# Patient Record
Sex: FEMALE | Race: WHITE | NOT HISPANIC OR LATINO | Employment: FULL TIME | URBAN - NONMETROPOLITAN AREA
[De-identification: names, ages, dates, MRNs, and addresses within clinical notes are randomized per-mention and may not be internally consistent; named-entity substitution may affect disease eponyms.]

---

## 2018-03-26 ENCOUNTER — APPOINTMENT (OUTPATIENT)
Dept: GENERAL RADIOLOGY | Facility: HOSPITAL | Age: 28
End: 2018-03-26

## 2018-03-26 ENCOUNTER — HOSPITAL ENCOUNTER (OUTPATIENT)
Facility: HOSPITAL | Age: 28
Discharge: HOME | End: 2018-03-27
Attending: EMERGENCY MEDICINE | Admitting: ORTHOPAEDIC SURGERY

## 2018-03-26 DIAGNOSIS — S06.0X1A CONCUSSION WITH LOSS OF CONSCIOUSNESS OF 30 MINUTES OR LESS, INITIAL ENCOUNTER: ICD-10-CM

## 2018-03-26 DIAGNOSIS — S52.201A CLOSED FRACTURE OF RIGHT RADIUS AND ULNA, INITIAL ENCOUNTER: Primary | ICD-10-CM

## 2018-03-26 DIAGNOSIS — S52.91XA CLOSED FRACTURE OF RIGHT RADIUS AND ULNA, INITIAL ENCOUNTER: Primary | ICD-10-CM

## 2018-03-26 LAB
BASOPHILS # BLD AUTO: 0.04 10*3/MM3 (ref 0–0.3)
BASOPHILS NFR BLD AUTO: 0.2 % (ref 0–2)
DEPRECATED RDW RBC AUTO: 41.5 FL (ref 37–54)
EOSINOPHIL # BLD AUTO: 0.18 10*3/MM3 (ref 0–0.7)
EOSINOPHIL NFR BLD AUTO: 1.1 % (ref 0–5)
ERYTHROCYTE [DISTWIDTH] IN BLOOD BY AUTOMATED COUNT: 13.4 % (ref 11.5–14.5)
HCG SERPL QL: NEGATIVE
HCT VFR BLD AUTO: 39.4 % (ref 37–47)
HGB BLD-MCNC: 12.9 G/DL (ref 12–16)
IMM GRANULOCYTES # BLD: 0.08 10*3/MM3 (ref 0–0.03)
IMM GRANULOCYTES NFR BLD: 0.5 % (ref 0–0.5)
INR PPP: 1.01 (ref 0.9–1.1)
LYMPHOCYTES # BLD AUTO: 4.76 10*3/MM3 (ref 1–3)
LYMPHOCYTES NFR BLD AUTO: 29 % (ref 21–51)
MCH RBC QN AUTO: 28 PG (ref 27–33)
MCHC RBC AUTO-ENTMCNC: 32.7 G/DL (ref 33–37)
MCV RBC AUTO: 85.5 FL (ref 80–94)
MONOCYTES # BLD AUTO: 0.8 10*3/MM3 (ref 0.1–0.9)
MONOCYTES NFR BLD AUTO: 4.9 % (ref 0–10)
NEUTROPHILS # BLD AUTO: 10.56 10*3/MM3 (ref 1.4–6.5)
NEUTROPHILS NFR BLD AUTO: 64.3 % (ref 30–70)
PLATELET # BLD AUTO: 244 10*3/MM3 (ref 130–400)
PMV BLD AUTO: 11.5 FL (ref 6–10)
PROTHROMBIN TIME: 13.4 SECONDS (ref 11–15.4)
RBC # BLD AUTO: 4.61 10*6/MM3 (ref 4.2–5.4)
WBC NRBC COR # BLD: 16.42 10*3/MM3 (ref 4.5–12.5)

## 2018-03-26 PROCEDURE — 85025 COMPLETE CBC W/AUTO DIFF WBC: CPT | Performed by: EMERGENCY MEDICINE

## 2018-03-26 PROCEDURE — 73090 X-RAY EXAM OF FOREARM: CPT | Performed by: RADIOLOGY

## 2018-03-26 PROCEDURE — 73070 X-RAY EXAM OF ELBOW: CPT

## 2018-03-26 PROCEDURE — 71045 X-RAY EXAM CHEST 1 VIEW: CPT | Performed by: RADIOLOGY

## 2018-03-26 PROCEDURE — 85610 PROTHROMBIN TIME: CPT | Performed by: EMERGENCY MEDICINE

## 2018-03-26 PROCEDURE — 25010000002 FENTANYL CITRATE (PF) 100 MCG/2ML SOLUTION

## 2018-03-26 PROCEDURE — 25010000002 ONDANSETRON PER 1 MG

## 2018-03-26 PROCEDURE — 73070 X-RAY EXAM OF ELBOW: CPT | Performed by: RADIOLOGY

## 2018-03-26 PROCEDURE — 36415 COLL VENOUS BLD VENIPUNCTURE: CPT

## 2018-03-26 PROCEDURE — 93005 ELECTROCARDIOGRAM TRACING: CPT | Performed by: EMERGENCY MEDICINE

## 2018-03-26 PROCEDURE — 73110 X-RAY EXAM OF WRIST: CPT | Performed by: RADIOLOGY

## 2018-03-26 PROCEDURE — 71045 X-RAY EXAM CHEST 1 VIEW: CPT

## 2018-03-26 PROCEDURE — 73090 X-RAY EXAM OF FOREARM: CPT

## 2018-03-26 PROCEDURE — 84703 CHORIONIC GONADOTROPIN ASSAY: CPT | Performed by: EMERGENCY MEDICINE

## 2018-03-26 PROCEDURE — 80053 COMPREHEN METABOLIC PANEL: CPT | Performed by: EMERGENCY MEDICINE

## 2018-03-26 PROCEDURE — 96374 THER/PROPH/DIAG INJ IV PUSH: CPT

## 2018-03-26 PROCEDURE — 73110 X-RAY EXAM OF WRIST: CPT

## 2018-03-26 PROCEDURE — 84484 ASSAY OF TROPONIN QUANT: CPT | Performed by: EMERGENCY MEDICINE

## 2018-03-26 PROCEDURE — 96375 TX/PRO/DX INJ NEW DRUG ADDON: CPT

## 2018-03-26 PROCEDURE — 99285 EMERGENCY DEPT VISIT HI MDM: CPT

## 2018-03-26 RX ORDER — FENTANYL CITRATE 50 UG/ML
INJECTION, SOLUTION INTRAMUSCULAR; INTRAVENOUS
Status: COMPLETED
Start: 2018-03-26 | End: 2018-03-26

## 2018-03-26 RX ORDER — ONDANSETRON 2 MG/ML
4 INJECTION INTRAMUSCULAR; INTRAVENOUS ONCE
Status: COMPLETED | OUTPATIENT
Start: 2018-03-26 | End: 2018-03-26

## 2018-03-26 RX ORDER — ONDANSETRON 2 MG/ML
INJECTION INTRAMUSCULAR; INTRAVENOUS
Status: COMPLETED
Start: 2018-03-26 | End: 2018-03-26

## 2018-03-26 RX ORDER — FENTANYL CITRATE 50 UG/ML
100 INJECTION, SOLUTION INTRAMUSCULAR; INTRAVENOUS ONCE
Status: COMPLETED | OUTPATIENT
Start: 2018-03-26 | End: 2018-03-26

## 2018-03-26 RX ORDER — FENTANYL CITRATE 50 UG/ML
100 INJECTION, SOLUTION INTRAMUSCULAR; INTRAVENOUS ONCE
Status: COMPLETED | OUTPATIENT
Start: 2018-03-26 | End: 2018-03-27

## 2018-03-26 RX ORDER — SODIUM CHLORIDE 0.9 % (FLUSH) 0.9 %
10 SYRINGE (ML) INJECTION AS NEEDED
Status: DISCONTINUED | OUTPATIENT
Start: 2018-03-26 | End: 2018-03-27

## 2018-03-26 RX ADMIN — ONDANSETRON 4 MG: 2 INJECTION INTRAMUSCULAR; INTRAVENOUS at 23:07

## 2018-03-26 RX ADMIN — FENTANYL CITRATE 100 MCG: 50 INJECTION, SOLUTION INTRAMUSCULAR; INTRAVENOUS at 23:07

## 2018-03-26 RX ADMIN — ONDANSETRON 4 MG: 2 INJECTION, SOLUTION INTRAMUSCULAR; INTRAVENOUS at 23:07

## 2018-03-26 RX ADMIN — FENTANYL CITRATE 100 MCG: 50 INJECTION INTRAMUSCULAR; INTRAVENOUS at 23:07

## 2018-03-27 ENCOUNTER — APPOINTMENT (OUTPATIENT)
Dept: GENERAL RADIOLOGY | Facility: HOSPITAL | Age: 28
End: 2018-03-27

## 2018-03-27 ENCOUNTER — ANESTHESIA EVENT (OUTPATIENT)
Dept: PERIOP | Facility: HOSPITAL | Age: 28
End: 2018-03-27

## 2018-03-27 ENCOUNTER — APPOINTMENT (OUTPATIENT)
Dept: CT IMAGING | Facility: HOSPITAL | Age: 28
End: 2018-03-27

## 2018-03-27 ENCOUNTER — ANESTHESIA (OUTPATIENT)
Dept: PERIOP | Facility: HOSPITAL | Age: 28
End: 2018-03-27

## 2018-03-27 VITALS
BODY MASS INDEX: 25.34 KG/M2 | SYSTOLIC BLOOD PRESSURE: 109 MMHG | DIASTOLIC BLOOD PRESSURE: 51 MMHG | HEART RATE: 99 BPM | RESPIRATION RATE: 18 BRPM | OXYGEN SATURATION: 99 % | TEMPERATURE: 98.3 F | WEIGHT: 137.7 LBS | HEIGHT: 62 IN

## 2018-03-27 PROBLEM — S52.91XA CLOSED FRACTURE OF RIGHT RADIUS AND ULNA: Status: ACTIVE | Noted: 2018-03-27

## 2018-03-27 PROBLEM — S52.201A CLOSED FRACTURE OF RIGHT RADIUS AND ULNA: Status: ACTIVE | Noted: 2018-03-27

## 2018-03-27 LAB
ALBUMIN SERPL-MCNC: 4.2 G/DL (ref 3.5–5)
ALBUMIN/GLOB SERPL: 1.4 G/DL (ref 1.5–2.5)
ALP SERPL-CCNC: 68 U/L (ref 35–104)
ALT SERPL W P-5'-P-CCNC: 34 U/L (ref 10–36)
ANION GAP SERPL CALCULATED.3IONS-SCNC: 8.4 MMOL/L (ref 3.6–11.2)
AST SERPL-CCNC: 93 U/L (ref 10–30)
BACTERIA UR QL AUTO: ABNORMAL /HPF
BILIRUB SERPL-MCNC: 0.3 MG/DL (ref 0.2–1.8)
BILIRUB UR QL STRIP: NEGATIVE
BUN BLD-MCNC: 9 MG/DL (ref 7–21)
BUN/CREAT SERPL: 13.6 (ref 7–25)
CALCIUM SPEC-SCNC: 9.3 MG/DL (ref 7.7–10)
CHLORIDE SERPL-SCNC: 109 MMOL/L (ref 99–112)
CLARITY UR: CLEAR
CO2 SERPL-SCNC: 21.6 MMOL/L (ref 24.3–31.9)
COLOR UR: YELLOW
CREAT BLD-MCNC: 0.66 MG/DL (ref 0.43–1.29)
GFR SERPL CREATININE-BSD FRML MDRD: 106 ML/MIN/1.73
GLOBULIN UR ELPH-MCNC: 2.9 GM/DL
GLUCOSE BLD-MCNC: 92 MG/DL (ref 70–110)
GLUCOSE UR STRIP-MCNC: NEGATIVE MG/DL
HCG INTACT+B SERPL-ACNC: <2 MIU/ML (ref 0–5)
HGB UR QL STRIP.AUTO: ABNORMAL
HOLD SPECIMEN: NORMAL
HOLD SPECIMEN: NORMAL
HYALINE CASTS UR QL AUTO: ABNORMAL /LPF
KETONES UR QL STRIP: ABNORMAL
LEUKOCYTE ESTERASE UR QL STRIP.AUTO: NEGATIVE
NITRITE UR QL STRIP: POSITIVE
OSMOLALITY SERPL CALC.SUM OF ELEC: 275.9 MOSM/KG (ref 273–305)
PH UR STRIP.AUTO: 5.5 [PH] (ref 5–8)
POTASSIUM BLD-SCNC: 3.3 MMOL/L (ref 3.5–5.3)
PROT SERPL-MCNC: 7.1 G/DL (ref 6–8)
PROT UR QL STRIP: NEGATIVE
RBC # UR: ABNORMAL /HPF
REF LAB TEST METHOD: ABNORMAL
SODIUM BLD-SCNC: 139 MMOL/L (ref 135–153)
SP GR UR STRIP: >1.03 (ref 1–1.03)
SQUAMOUS #/AREA URNS HPF: ABNORMAL /HPF
TROPONIN I SERPL-MCNC: <0.006 NG/ML
UROBILINOGEN UR QL STRIP: ABNORMAL
WBC UR QL AUTO: ABNORMAL /HPF
WHOLE BLOOD HOLD SPECIMEN: NORMAL
WHOLE BLOOD HOLD SPECIMEN: NORMAL

## 2018-03-27 PROCEDURE — 87186 SC STD MICRODIL/AGAR DIL: CPT | Performed by: EMERGENCY MEDICINE

## 2018-03-27 PROCEDURE — 25010000002 MIDAZOLAM PER 1 MG: Performed by: NURSE ANESTHETIST, CERTIFIED REGISTERED

## 2018-03-27 PROCEDURE — 25575 OPTX RDL&ULN SHFT FX RDS&ULN: CPT | Performed by: ORTHOPAEDIC SURGERY

## 2018-03-27 PROCEDURE — G0378 HOSPITAL OBSERVATION PER HR: HCPCS

## 2018-03-27 PROCEDURE — C1713 ANCHOR/SCREW BN/BN,TIS/BN: HCPCS | Performed by: ORTHOPAEDIC SURGERY

## 2018-03-27 PROCEDURE — 96375 TX/PRO/DX INJ NEW DRUG ADDON: CPT

## 2018-03-27 PROCEDURE — 71260 CT THORAX DX C+: CPT

## 2018-03-27 PROCEDURE — 99219 PR INITIAL OBSERVATION CARE/DAY 50 MINUTES: CPT | Performed by: PHYSICIAN ASSISTANT

## 2018-03-27 PROCEDURE — 84702 CHORIONIC GONADOTROPIN TEST: CPT | Performed by: PHYSICIAN ASSISTANT

## 2018-03-27 PROCEDURE — 74177 CT ABD & PELVIS W/CONTRAST: CPT | Performed by: RADIOLOGY

## 2018-03-27 PROCEDURE — 81001 URINALYSIS AUTO W/SCOPE: CPT | Performed by: EMERGENCY MEDICINE

## 2018-03-27 PROCEDURE — 70450 CT HEAD/BRAIN W/O DYE: CPT

## 2018-03-27 PROCEDURE — 25010000002 DEXAMETHASONE PER 1 MG: Performed by: NURSE ANESTHETIST, CERTIFIED REGISTERED

## 2018-03-27 PROCEDURE — 25010000002 FENTANYL CITRATE (PF) 100 MCG/2ML SOLUTION: Performed by: NURSE ANESTHETIST, CERTIFIED REGISTERED

## 2018-03-27 PROCEDURE — 74177 CT ABD & PELVIS W/CONTRAST: CPT

## 2018-03-27 PROCEDURE — 96361 HYDRATE IV INFUSION ADD-ON: CPT

## 2018-03-27 PROCEDURE — 71260 CT THORAX DX C+: CPT | Performed by: RADIOLOGY

## 2018-03-27 PROCEDURE — 96376 TX/PRO/DX INJ SAME DRUG ADON: CPT

## 2018-03-27 PROCEDURE — 25010000002 IOPAMIDOL 61 % SOLUTION: Performed by: EMERGENCY MEDICINE

## 2018-03-27 PROCEDURE — 25010000002 PROPOFOL 10 MG/ML EMULSION: Performed by: NURSE ANESTHETIST, CERTIFIED REGISTERED

## 2018-03-27 PROCEDURE — 25010000002 HYDROMORPHONE PER 4 MG: Performed by: EMERGENCY MEDICINE

## 2018-03-27 PROCEDURE — 87086 URINE CULTURE/COLONY COUNT: CPT | Performed by: EMERGENCY MEDICINE

## 2018-03-27 PROCEDURE — 72125 CT NECK SPINE W/O DYE: CPT

## 2018-03-27 PROCEDURE — 25010000002 MORPHINE PER 10 MG: Performed by: EMERGENCY MEDICINE

## 2018-03-27 PROCEDURE — 25010000002 KETOROLAC TROMETHAMINE PER 15 MG: Performed by: ORTHOPAEDIC SURGERY

## 2018-03-27 PROCEDURE — 25010000002 ONDANSETRON PER 1 MG: Performed by: NURSE ANESTHETIST, CERTIFIED REGISTERED

## 2018-03-27 PROCEDURE — 87077 CULTURE AEROBIC IDENTIFY: CPT | Performed by: EMERGENCY MEDICINE

## 2018-03-27 PROCEDURE — 25010000002 FENTANYL CITRATE (PF) 100 MCG/2ML SOLUTION: Performed by: EMERGENCY MEDICINE

## 2018-03-27 PROCEDURE — 73090 X-RAY EXAM OF FOREARM: CPT | Performed by: RADIOLOGY

## 2018-03-27 PROCEDURE — 76000 FLUOROSCOPY <1 HR PHYS/QHP: CPT

## 2018-03-27 PROCEDURE — 70450 CT HEAD/BRAIN W/O DYE: CPT | Performed by: RADIOLOGY

## 2018-03-27 PROCEDURE — 72125 CT NECK SPINE W/O DYE: CPT | Performed by: RADIOLOGY

## 2018-03-27 DEVICE — SCRW CORT S/TAP 3.5X14MM: Type: IMPLANTABLE DEVICE | Status: FUNCTIONAL

## 2018-03-27 DEVICE — SCRW CORT S/TAP 3.5X16MM: Type: IMPLANTABLE DEVICE | Status: FUNCTIONAL

## 2018-03-27 DEVICE — PLT LCP 6HL 3.5X11MM: Type: IMPLANTABLE DEVICE | Status: FUNCTIONAL

## 2018-03-27 DEVICE — IMPLANTABLE DEVICE: Type: IMPLANTABLE DEVICE | Status: FUNCTIONAL

## 2018-03-27 RX ORDER — SODIUM CHLORIDE, SODIUM LACTATE, POTASSIUM CHLORIDE, CALCIUM CHLORIDE 600; 310; 30; 20 MG/100ML; MG/100ML; MG/100ML; MG/100ML
100 INJECTION, SOLUTION INTRAVENOUS CONTINUOUS
Status: DISCONTINUED | OUTPATIENT
Start: 2018-03-27 | End: 2018-03-27

## 2018-03-27 RX ORDER — OXYCODONE HYDROCHLORIDE AND ACETAMINOPHEN 5; 325 MG/1; MG/1
1 TABLET ORAL ONCE AS NEEDED
Status: DISCONTINUED | OUTPATIENT
Start: 2018-03-27 | End: 2018-03-27 | Stop reason: HOSPADM

## 2018-03-27 RX ORDER — PROPOFOL 10 MG/ML
VIAL (ML) INTRAVENOUS AS NEEDED
Status: DISCONTINUED | OUTPATIENT
Start: 2018-03-27 | End: 2018-03-27 | Stop reason: SURG

## 2018-03-27 RX ORDER — FENTANYL CITRATE 50 UG/ML
50 INJECTION, SOLUTION INTRAMUSCULAR; INTRAVENOUS
Status: DISCONTINUED | OUTPATIENT
Start: 2018-03-27 | End: 2018-03-27 | Stop reason: HOSPADM

## 2018-03-27 RX ORDER — SODIUM CHLORIDE 0.9 % (FLUSH) 0.9 %
1-10 SYRINGE (ML) INJECTION AS NEEDED
Status: DISCONTINUED | OUTPATIENT
Start: 2018-03-27 | End: 2018-03-27 | Stop reason: HOSPADM

## 2018-03-27 RX ORDER — ONDANSETRON 2 MG/ML
4 INJECTION INTRAMUSCULAR; INTRAVENOUS ONCE AS NEEDED
Status: DISCONTINUED | OUTPATIENT
Start: 2018-03-27 | End: 2018-03-27 | Stop reason: HOSPADM

## 2018-03-27 RX ORDER — LIDOCAINE HYDROCHLORIDE 20 MG/ML
INJECTION, SOLUTION INFILTRATION; PERINEURAL AS NEEDED
Status: DISCONTINUED | OUTPATIENT
Start: 2018-03-27 | End: 2018-03-27 | Stop reason: SURG

## 2018-03-27 RX ORDER — HYDROCODONE BITARTRATE AND ACETAMINOPHEN 7.5; 325 MG/1; MG/1
1 TABLET ORAL ONCE AS NEEDED
Status: DISCONTINUED | OUTPATIENT
Start: 2018-03-27 | End: 2018-03-27 | Stop reason: HOSPADM

## 2018-03-27 RX ORDER — SODIUM CHLORIDE, SODIUM LACTATE, POTASSIUM CHLORIDE, CALCIUM CHLORIDE 600; 310; 30; 20 MG/100ML; MG/100ML; MG/100ML; MG/100ML
125 INJECTION, SOLUTION INTRAVENOUS CONTINUOUS
Status: DISCONTINUED | OUTPATIENT
Start: 2018-03-27 | End: 2018-03-27

## 2018-03-27 RX ORDER — KETOROLAC TROMETHAMINE 30 MG/ML
30 INJECTION, SOLUTION INTRAMUSCULAR; INTRAVENOUS ONCE
Status: COMPLETED | OUTPATIENT
Start: 2018-03-27 | End: 2018-03-27

## 2018-03-27 RX ORDER — HYDROCODONE BITARTRATE AND ACETAMINOPHEN 7.5; 325 MG/1; MG/1
1-2 TABLET ORAL EVERY 6 HOURS PRN
Qty: 24 TABLET | Refills: 0 | Status: SHIPPED | OUTPATIENT
Start: 2018-03-27 | End: 2018-10-28

## 2018-03-27 RX ORDER — FAMOTIDINE 10 MG/ML
INJECTION, SOLUTION INTRAVENOUS AS NEEDED
Status: DISCONTINUED | OUTPATIENT
Start: 2018-03-27 | End: 2018-03-27 | Stop reason: SURG

## 2018-03-27 RX ORDER — IPRATROPIUM BROMIDE AND ALBUTEROL SULFATE 2.5; .5 MG/3ML; MG/3ML
3 SOLUTION RESPIRATORY (INHALATION) ONCE AS NEEDED
Status: DISCONTINUED | OUTPATIENT
Start: 2018-03-27 | End: 2018-03-27 | Stop reason: HOSPADM

## 2018-03-27 RX ORDER — FENTANYL CITRATE 50 UG/ML
INJECTION, SOLUTION INTRAMUSCULAR; INTRAVENOUS AS NEEDED
Status: DISCONTINUED | OUTPATIENT
Start: 2018-03-27 | End: 2018-03-27 | Stop reason: SURG

## 2018-03-27 RX ORDER — ONDANSETRON 4 MG/1
4 TABLET, FILM COATED ORAL EVERY 8 HOURS PRN
Qty: 24 TABLET | Refills: 0 | Status: SHIPPED | OUTPATIENT
Start: 2018-03-27 | End: 2018-10-28

## 2018-03-27 RX ORDER — MIDAZOLAM HYDROCHLORIDE 1 MG/ML
1 INJECTION INTRAMUSCULAR; INTRAVENOUS
Status: DISCONTINUED | OUTPATIENT
Start: 2018-03-27 | End: 2018-03-27 | Stop reason: HOSPADM

## 2018-03-27 RX ORDER — ONDANSETRON 2 MG/ML
4 INJECTION INTRAMUSCULAR; INTRAVENOUS EVERY 8 HOURS PRN
Status: DISCONTINUED | OUTPATIENT
Start: 2018-03-27 | End: 2018-03-27

## 2018-03-27 RX ORDER — HYDROMORPHONE HCL 110MG/55ML
1 PATIENT CONTROLLED ANALGESIA SYRINGE INTRAVENOUS ONCE
Status: COMPLETED | OUTPATIENT
Start: 2018-03-27 | End: 2018-03-27

## 2018-03-27 RX ORDER — BUPIVACAINE HYDROCHLORIDE 5 MG/ML
INJECTION, SOLUTION PERINEURAL AS NEEDED
Status: DISCONTINUED | OUTPATIENT
Start: 2018-03-27 | End: 2018-03-27 | Stop reason: HOSPADM

## 2018-03-27 RX ORDER — MIDAZOLAM HYDROCHLORIDE 1 MG/ML
2 INJECTION INTRAMUSCULAR; INTRAVENOUS
Status: DISCONTINUED | OUTPATIENT
Start: 2018-03-27 | End: 2018-03-27 | Stop reason: HOSPADM

## 2018-03-27 RX ORDER — IBUPROFEN 600 MG/1
600 TABLET ORAL EVERY 6 HOURS PRN
Qty: 32 TABLET | Refills: 2 | Status: SHIPPED | OUTPATIENT
Start: 2018-03-27 | End: 2018-10-28

## 2018-03-27 RX ORDER — MEPERIDINE HYDROCHLORIDE 50 MG/ML
12.5 INJECTION INTRAMUSCULAR; INTRAVENOUS; SUBCUTANEOUS
Status: DISCONTINUED | OUTPATIENT
Start: 2018-03-27 | End: 2018-03-27 | Stop reason: HOSPADM

## 2018-03-27 RX ORDER — ONDANSETRON 2 MG/ML
INJECTION INTRAMUSCULAR; INTRAVENOUS AS NEEDED
Status: DISCONTINUED | OUTPATIENT
Start: 2018-03-27 | End: 2018-03-27 | Stop reason: SURG

## 2018-03-27 RX ORDER — MIDAZOLAM HYDROCHLORIDE 1 MG/ML
INJECTION INTRAMUSCULAR; INTRAVENOUS AS NEEDED
Status: DISCONTINUED | OUTPATIENT
Start: 2018-03-27 | End: 2018-03-27 | Stop reason: SURG

## 2018-03-27 RX ORDER — DEXAMETHASONE SODIUM PHOSPHATE 10 MG/ML
INJECTION INTRAMUSCULAR; INTRAVENOUS AS NEEDED
Status: DISCONTINUED | OUTPATIENT
Start: 2018-03-27 | End: 2018-03-27 | Stop reason: SURG

## 2018-03-27 RX ADMIN — MORPHINE SULFATE 4 MG: 4 INJECTION INTRAVENOUS at 03:38

## 2018-03-27 RX ADMIN — KETOROLAC TROMETHAMINE 30 MG: 30 INJECTION, SOLUTION INTRAMUSCULAR at 10:21

## 2018-03-27 RX ADMIN — EPHEDRINE SULFATE 10 MG: 50 INJECTION INTRAMUSCULAR; INTRAVENOUS; SUBCUTANEOUS at 12:48

## 2018-03-27 RX ADMIN — FENTANYL CITRATE 100 MCG: 50 INJECTION INTRAMUSCULAR; INTRAVENOUS at 00:04

## 2018-03-27 RX ADMIN — FAMOTIDINE 20 MG: 10 INJECTION, SOLUTION INTRAVENOUS at 12:33

## 2018-03-27 RX ADMIN — SODIUM CHLORIDE 1000 ML: 9 INJECTION, SOLUTION INTRAVENOUS at 02:55

## 2018-03-27 RX ADMIN — ONDANSETRON 4 MG: 2 INJECTION, SOLUTION INTRAMUSCULAR; INTRAVENOUS at 12:33

## 2018-03-27 RX ADMIN — MORPHINE SULFATE 4 MG: 4 INJECTION INTRAVENOUS at 06:56

## 2018-03-27 RX ADMIN — IOPAMIDOL 95 ML: 612 INJECTION, SOLUTION INTRAVENOUS at 00:34

## 2018-03-27 RX ADMIN — MIDAZOLAM HYDROCHLORIDE 2 MG: 1 INJECTION, SOLUTION INTRAMUSCULAR; INTRAVENOUS at 12:19

## 2018-03-27 RX ADMIN — LIDOCAINE HYDROCHLORIDE 40 MG: 20 INJECTION, SOLUTION INFILTRATION; PERINEURAL at 12:19

## 2018-03-27 RX ADMIN — PROPOFOL 150 MG: 10 INJECTION, EMULSION INTRAVENOUS at 12:19

## 2018-03-27 RX ADMIN — SODIUM CHLORIDE 1000 ML: 9 INJECTION, SOLUTION INTRAVENOUS at 00:04

## 2018-03-27 RX ADMIN — EPHEDRINE SULFATE 10 MG: 50 INJECTION INTRAMUSCULAR; INTRAVENOUS; SUBCUTANEOUS at 12:36

## 2018-03-27 RX ADMIN — HYDROMORPHONE HYDROCHLORIDE 1 MG: 2 INJECTION INTRAMUSCULAR; INTRAVENOUS; SUBCUTANEOUS at 01:50

## 2018-03-27 RX ADMIN — DEXAMETHASONE SODIUM PHOSPHATE 8 MG: 10 INJECTION INTRAMUSCULAR; INTRAVENOUS at 12:33

## 2018-03-27 RX ADMIN — FENTANYL CITRATE 100 MCG: 50 INJECTION INTRAMUSCULAR; INTRAVENOUS at 12:19

## 2018-03-27 RX ADMIN — SODIUM CHLORIDE, POTASSIUM CHLORIDE, SODIUM LACTATE AND CALCIUM CHLORIDE: 600; 310; 30; 20 INJECTION, SOLUTION INTRAVENOUS at 12:13

## 2018-03-27 NOTE — ANESTHESIA PROCEDURE NOTES
Airway    General Information and Staff    CRNA: MAHAD ALVARES    Indications and Patient Condition    Preoxygenated: yes      Final Airway Details  Final airway type: supraglottic airway      Successful airway: classic  Size 3    Number of attempts at approach: 1

## 2018-03-27 NOTE — ANESTHESIA PREPROCEDURE EVALUATION
Anesthesia Evaluation     no history of anesthetic complications:  NPO Solid Status: > 8 hours  NPO Liquid Status: > 8 hours           Airway   Mallampati: II  TM distance: >3 FB  Neck ROM: full  No difficulty expected  Dental - normal exam     Pulmonary - normal exam   (+) a smoker Current Smoked day of surgery,   Cardiovascular - normal exam        Neuro/Psych  GI/Hepatic/Renal/Endo      Musculoskeletal     Abdominal  - normal exam   Substance History      OB/GYN          Other                        Anesthesia Plan    ASA 2     general     intravenous induction   Anesthetic plan and risks discussed with patient.

## 2018-03-27 NOTE — ANESTHESIA POSTPROCEDURE EVALUATION
Patient: Avani Rm    Procedure Summary     Date:  03/27/18 Room / Location:  [facility] OR 03 /  [facility] OR    Anesthesia Start:  1213 Anesthesia Stop:  1354    Procedure:  ULNA/RADIUS OPEN REDUCTION INTERNAL FIXATION (Right Hand) Diagnosis:       Closed fracture of right radius and ulna, initial encounter      (Closed fracture of right radius and ulna, initial encounter [S52.91XA, S52.201A])    Surgeon:  Benja Keene MD Provider:  Howard Kramer MD    Anesthesia Type:  general ASA Status:  2          Anesthesia Type: general  Last vitals  BP   107/63 (03/27/18 1418)   Temp   97.4 °F (36.3 °C) (03/27/18 1353)   Pulse   109 (03/27/18 1418)   Resp   14 (03/27/18 1418)     SpO2   100 % (03/27/18 1418)     Post Anesthesia Care and Evaluation    Patient location during evaluation: bedside  Patient participation: complete - patient participated  Level of consciousness: awake and alert  Pain score: 1  Pain management: adequate  Airway patency: patent  Anesthetic complications: No anesthetic complications  PONV Status: none  Cardiovascular status: acceptable  Respiratory status: acceptable  Hydration status: acceptable

## 2018-03-29 LAB
BACTERIA SPEC AEROBE CULT: ABNORMAL
BACTERIA SPEC AEROBE CULT: ABNORMAL
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
## 2018-04-09 DIAGNOSIS — S52.501D CLOSED FRACTURE OF DISTAL ENDS OF RIGHT RADIUS AND ULNA WITH ROUTINE HEALING, SUBSEQUENT ENCOUNTER: Primary | ICD-10-CM

## 2018-04-09 DIAGNOSIS — S52.601D CLOSED FRACTURE OF DISTAL ENDS OF RIGHT RADIUS AND ULNA WITH ROUTINE HEALING, SUBSEQUENT ENCOUNTER: Primary | ICD-10-CM

## 2018-04-10 ENCOUNTER — APPOINTMENT (OUTPATIENT)
Dept: GENERAL RADIOLOGY | Facility: HOSPITAL | Age: 28
End: 2018-04-10
Attending: ORTHOPAEDIC SURGERY

## 2018-04-11 DIAGNOSIS — S52.91XD CLOSED FRACTURE OF RIGHT FOREARM WITH ROUTINE HEALING, SUBSEQUENT ENCOUNTER: Primary | ICD-10-CM

## 2018-04-12 ENCOUNTER — APPOINTMENT (OUTPATIENT)
Dept: GENERAL RADIOLOGY | Facility: HOSPITAL | Age: 28
End: 2018-04-12
Attending: ORTHOPAEDIC SURGERY

## 2018-04-22 ENCOUNTER — HOSPITAL ENCOUNTER (EMERGENCY)
Facility: HOSPITAL | Age: 28
Discharge: LEFT BEFORE DISCHARGE | End: 2018-04-22
Attending: EMERGENCY MEDICINE | Admitting: EMERGENCY MEDICINE

## 2018-04-22 VITALS
WEIGHT: 125 LBS | HEIGHT: 61 IN | HEART RATE: 94 BPM | BODY MASS INDEX: 23.6 KG/M2 | TEMPERATURE: 98 F | DIASTOLIC BLOOD PRESSURE: 67 MMHG | SYSTOLIC BLOOD PRESSURE: 121 MMHG | OXYGEN SATURATION: 100 % | RESPIRATION RATE: 18 BRPM

## 2018-04-22 DIAGNOSIS — Z48.89 ENCOUNTER FOR POST SURGICAL WOUND CHECK: Primary | ICD-10-CM

## 2018-04-22 PROCEDURE — 99282 EMERGENCY DEPT VISIT SF MDM: CPT

## 2018-04-22 NOTE — ED NOTES
Pt continues to not be in room.  DONALD Leonardo notified.  Pt noted to have eloped at this time.      Cassidy Moore RN  04/22/18 0635

## 2018-04-22 NOTE — ED NOTES
Went to reevaluate pt.  Pt is not in room at this time.  Pt is not in the bathroom.  Unable to locate.  DONALD Leonardo notified at this time.      Cassidy Moore RN  04/22/18 1581

## 2018-04-22 NOTE — ED PROVIDER NOTES
Subjective     Suture / Staple Removal   Location:  Right forearm.  Severity:  Mild  Duration:  1 month  Timing:  Constant  Chronicity:  New  Context:  Patient ha her right forearm surgically repaired for fracture.  She states that she took her own cast off and has not followed up with ortho.  She wishes to have her staples removed.  Associated symptoms: no abdominal pain, no chest pain, no fever and no rash        Review of Systems   Constitutional: Negative.  Negative for fever.   HENT: Negative.    Respiratory: Negative.    Cardiovascular: Negative.  Negative for chest pain.   Gastrointestinal: Negative.  Negative for abdominal pain.   Endocrine: Negative.    Genitourinary: Negative.  Negative for dysuria.   Skin: Negative.  Negative for rash.   Neurological: Negative.    Psychiatric/Behavioral: Negative.    All other systems reviewed and are negative.      Past Medical History:   Diagnosis Date   • Fracture, radius and ulna, proximal     right       No Known Allergies    Past Surgical History:   Procedure Laterality Date   •  SECTION      x 3   • ORIF ULNA/RADIUS FRACTURES Right 3/27/2018    Procedure: ULNA/RADIUS OPEN REDUCTION INTERNAL FIXATION;  Surgeon: Benja Keene MD;  Location: [facility];  Service: Orthopedics   • SALPINGECTOMY     • TONSILLECTOMY     • WISDOM TOOTH EXTRACTION         History reviewed. No pertinent family history.    Social History     Social History   • Marital status:      Social History Main Topics   • Smoking status: Current Every Day Smoker     Packs/day: 1.00     Years: 10.00     Types: Cigarettes   • Smokeless tobacco: Never Used   • Alcohol use No   • Drug use: No   • Sexual activity: Defer     Other Topics Concern   • Not on file           Objective   Physical Exam   Constitutional: She is oriented to person, place, and time. She appears well-developed and well-nourished. No distress.   HENT:   Head: Normocephalic and atraumatic.   Right Ear: External  ear normal.   Left Ear: External ear normal.   Nose: Nose normal.   Eyes: Conjunctivae and EOM are normal. Pupils are equal, round, and reactive to light.   Neck: Normal range of motion. Neck supple. No JVD present. No tracheal deviation present.   Cardiovascular: Normal rate, regular rhythm and normal heart sounds.    No murmur heard.  Pulmonary/Chest: Effort normal and breath sounds normal. No respiratory distress. She has no wheezes.   Abdominal: Soft. Bowel sounds are normal. There is no tenderness.   Musculoskeletal: Normal range of motion. She exhibits no edema or deformity.   Neurological: She is alert and oriented to person, place, and time. No cranial nerve deficit.   Skin: Skin is warm and dry. No rash noted. She is not diaphoretic. No erythema. No pallor.   Patient surgical sites are well healed.  No sign of infection.   Psychiatric: She has a normal mood and affect. Her behavior is normal. Thought content normal.   Nursing note and vitals reviewed.      Procedures         ED Course  ED Course   Comment By Time   Had previously paged  to d/w wound, staple removing and cast though while waiting to hear back patient eloped. DONALD Molina 04/22 1449                  MDM  Number of Diagnoses or Management Options  Encounter for post surgical wound check: minor  Risk of Complications, Morbidity, and/or Mortality  Presenting problems: low        Final diagnoses:   Encounter for post surgical wound check            DONALD Molina  04/22/18 2881

## 2018-04-23 ENCOUNTER — HOSPITAL ENCOUNTER (EMERGENCY)
Facility: HOSPITAL | Age: 28
Discharge: HOME | End: 2018-04-23
Attending: EMERGENCY MEDICINE | Admitting: EMERGENCY MEDICINE

## 2018-04-23 ENCOUNTER — APPOINTMENT (OUTPATIENT)
Dept: GENERAL RADIOLOGY | Facility: HOSPITAL | Age: 28
End: 2018-04-23

## 2018-04-23 VITALS
SYSTOLIC BLOOD PRESSURE: 115 MMHG | RESPIRATION RATE: 18 BRPM | WEIGHT: 127 LBS | HEIGHT: 61 IN | TEMPERATURE: 98 F | BODY MASS INDEX: 23.98 KG/M2 | DIASTOLIC BLOOD PRESSURE: 77 MMHG | HEART RATE: 90 BPM | OXYGEN SATURATION: 100 %

## 2018-04-23 DIAGNOSIS — Z48.02 ENCOUNTER FOR STAPLE REMOVAL: Primary | ICD-10-CM

## 2018-04-23 PROCEDURE — 73090 X-RAY EXAM OF FOREARM: CPT | Performed by: RADIOLOGY

## 2018-04-23 PROCEDURE — 99201: CPT

## 2018-04-23 PROCEDURE — 73090 X-RAY EXAM OF FOREARM: CPT

## 2018-04-23 PROCEDURE — 99283 EMERGENCY DEPT VISIT LOW MDM: CPT

## 2018-04-23 NOTE — ED NOTES
13 staples removed on wrist side  15 staples removed on elbow side.      Shoaib Schofield RN  04/23/18 3085

## 2018-04-23 NOTE — ED PROVIDER NOTES
Subjective     History provided by:  Patient   used: No    Suture / Staple Removal   Location:  Right forearm  Severity:  Mild  Onset quality:  Sudden  Timing:  Constant  Progression:  Unchanged  Chronicity:  New  Context:  Pt had distal radius/ulna fracture x5 weeks ago and underwent ORIF of fracture; states that she took her cast off 2 days ago and now needs her staples removed; states they were supposed to be removed x1 week ago   Associated symptoms: no chest pain, no congestion, no cough, no diarrhea, no fever, no headaches, no myalgias, no nausea, no rash, no rhinorrhea, no shortness of breath, no sore throat, no vomiting and no wheezing        Review of Systems   Constitutional: Negative for activity change and fever.   HENT: Negative for congestion, rhinorrhea and sore throat.    Eyes: Negative for pain.   Respiratory: Negative for cough, shortness of breath and wheezing.    Cardiovascular: Negative for chest pain.   Gastrointestinal: Negative for abdominal distention, diarrhea, nausea and vomiting.   Genitourinary: Negative for difficulty urinating and dysuria.   Musculoskeletal: Negative for arthralgias and myalgias.   Skin: Negative for rash and wound.   Neurological: Negative for dizziness and headaches.   Psychiatric/Behavioral: Negative for agitation.   All other systems reviewed and are negative.      Past Medical History:   Diagnosis Date   • Fracture, radius and ulna, proximal     right       No Known Allergies    Past Surgical History:   Procedure Laterality Date   •  SECTION      x 3   • ORIF ULNA/RADIUS FRACTURES Right 3/27/2018    Procedure: ULNA/RADIUS OPEN REDUCTION INTERNAL FIXATION;  Surgeon: Benja Keene MD;  Location: [facility];  Service: Orthopedics   • SALPINGECTOMY     • TONSILLECTOMY     • WISDOM TOOTH EXTRACTION         History reviewed. No pertinent family history.    Social History     Social History   • Marital status:      Social  History Main Topics   • Smoking status: Current Every Day Smoker     Packs/day: 1.00     Years: 10.00     Types: Cigarettes   • Smokeless tobacco: Never Used   • Alcohol use No   • Drug use: No   • Sexual activity: Defer     Other Topics Concern   • Not on file           Objective   Physical Exam   Constitutional: She is oriented to person, place, and time. She appears well-developed and well-nourished.   HENT:   Head: Normocephalic and atraumatic.   Eyes: EOM are normal. Pupils are equal, round, and reactive to light.   Neck: Normal range of motion. Neck supple.   Cardiovascular: Normal rate, regular rhythm and normal heart sounds.    Pulmonary/Chest: Effort normal and breath sounds normal.   Abdominal: Soft. Bowel sounds are normal.   Musculoskeletal: Normal range of motion.        Arms:  Neurological: She is alert and oriented to person, place, and time.   Skin: Skin is warm and dry.   Psychiatric: She has a normal mood and affect. Her behavior is normal. Judgment and thought content normal.   Nursing note and vitals reviewed.      Suture Removal  Date/Time: 4/23/2018 6:53 PM  Performed by: DRE CRAWFORD  Authorized by: XIOMARA TOWNSEND     Consent:     Consent obtained:  Verbal    Consent given by:  Patient    Risks discussed:  Bleeding, pain and wound separation  Location:     Location:  Upper extremity    Upper extremity location:  Arm    Arm location:  R lower arm  Procedure details:     Wound appearance:  Clean and good wound healing    Number of staples removed:  28  Post-procedure details:     Patient tolerance of procedure:  Tolerated well, no immediate complications             ED Course  ED Course   Comment By Time   13 staples removed on the right anterior wound, and 15 staples removed on the right medial wound. Wounds both healed well, there is no erythema, no dehiscence.  DONALD Garvin 04/23 1204                  [facility]  Number of Diagnoses or Management Options  Encounter for staple  removal:      Amount and/or Complexity of Data Reviewed  Clinical lab tests: ordered and reviewed  Tests in the radiology section of CPT®: ordered and reviewed  Tests in the medicine section of CPT®: ordered and reviewed  Independent visualization of images, tracings, or specimens: yes        Final diagnoses:   Encounter for staple removal            DONALD Garvin  04/23/18 0422

## 2018-07-27 ENCOUNTER — HOSPITAL ENCOUNTER (EMERGENCY)
Facility: HOSPITAL | Age: 28
Discharge: HOME | End: 2018-07-27
Admitting: INTERNAL MEDICINE

## 2018-07-27 VITALS
HEART RATE: 64 BPM | OXYGEN SATURATION: 100 % | DIASTOLIC BLOOD PRESSURE: 68 MMHG | SYSTOLIC BLOOD PRESSURE: 103 MMHG | HEIGHT: 62 IN | RESPIRATION RATE: 18 BRPM | WEIGHT: 135 LBS | TEMPERATURE: 98.4 F | BODY MASS INDEX: 24.84 KG/M2

## 2018-07-27 DIAGNOSIS — Z02.83 ENCOUNTER FOR DRUG SCREENING: Primary | ICD-10-CM

## 2018-07-27 LAB
6-ACETYL MORPHINE: NEGATIVE
AMPHET+METHAMPHET UR QL: NEGATIVE
BARBITURATES UR QL SCN: NEGATIVE
BENZODIAZ UR QL SCN: NEGATIVE
BUPRENORPHINE SERPL-MCNC: NEGATIVE NG/ML
CANNABINOIDS SERPL QL: NEGATIVE
COCAINE UR QL: NEGATIVE
METHADONE UR QL SCN: NEGATIVE
OPIATES UR QL: NEGATIVE
OXYCODONE UR QL SCN: NEGATIVE
PCP UR QL SCN: NEGATIVE

## 2018-07-27 PROCEDURE — 80307 DRUG TEST PRSMV CHEM ANLYZR: CPT | Performed by: NURSE PRACTITIONER

## 2018-07-27 PROCEDURE — 99283 EMERGENCY DEPT VISIT LOW MDM: CPT

## 2018-10-28 ENCOUNTER — HOSPITAL ENCOUNTER (EMERGENCY)
Facility: HOSPITAL | Age: 28
Discharge: HOME | End: 2018-10-28
Attending: INTERNAL MEDICINE | Admitting: INTERNAL MEDICINE

## 2018-10-28 VITALS
HEART RATE: 86 BPM | WEIGHT: 130 LBS | TEMPERATURE: 98.5 F | RESPIRATION RATE: 18 BRPM | SYSTOLIC BLOOD PRESSURE: 111 MMHG | OXYGEN SATURATION: 100 % | DIASTOLIC BLOOD PRESSURE: 80 MMHG | BODY MASS INDEX: 24.55 KG/M2 | HEIGHT: 61 IN

## 2018-10-28 DIAGNOSIS — K02.9 PAIN DUE TO DENTAL CARIES: Primary | ICD-10-CM

## 2018-10-28 PROCEDURE — 99283 EMERGENCY DEPT VISIT LOW MDM: CPT

## 2018-10-28 RX ORDER — CLINDAMYCIN HYDROCHLORIDE 300 MG/1
300 CAPSULE ORAL 4 TIMES DAILY
Qty: 40 CAPSULE | Refills: 0 | Status: SHIPPED | OUTPATIENT
Start: 2018-10-28 | End: 2018-11-07

## 2018-10-28 RX ORDER — CLINDAMYCIN HYDROCHLORIDE 150 MG/1
300 CAPSULE ORAL ONCE
Status: COMPLETED | OUTPATIENT
Start: 2018-10-28 | End: 2018-10-28

## 2018-10-28 RX ADMIN — BENZOCAINE: 200 LIQUID DENTAL; ORAL; PERIODONTAL at 19:12

## 2018-10-28 RX ADMIN — CLINDAMYCIN HYDROCHLORIDE 300 MG: 150 CAPSULE ORAL at 19:12

## 2018-12-02 ENCOUNTER — HOSPITAL ENCOUNTER (EMERGENCY)
Facility: HOSPITAL | Age: 28
Discharge: HOME | End: 2018-12-02
Attending: EMERGENCY MEDICINE | Admitting: NURSE PRACTITIONER

## 2018-12-02 VITALS
OXYGEN SATURATION: 99 % | RESPIRATION RATE: 18 BRPM | SYSTOLIC BLOOD PRESSURE: 112 MMHG | WEIGHT: 127 LBS | HEIGHT: 62 IN | BODY MASS INDEX: 23.37 KG/M2 | HEART RATE: 84 BPM | TEMPERATURE: 98.3 F | DIASTOLIC BLOOD PRESSURE: 62 MMHG

## 2018-12-02 DIAGNOSIS — N39.0 ACUTE UTI: Primary | ICD-10-CM

## 2018-12-02 LAB
6-ACETYL MORPHINE: NEGATIVE
ALBUMIN SERPL-MCNC: 4 G/DL (ref 3.5–5)
ALBUMIN/GLOB SERPL: 1.7 G/DL (ref 1.5–2.5)
ALP SERPL-CCNC: 63 U/L (ref 35–104)
ALT SERPL W P-5'-P-CCNC: 12 U/L (ref 10–36)
AMPHET+METHAMPHET UR QL: NEGATIVE
ANION GAP SERPL CALCULATED.3IONS-SCNC: 2.5 MMOL/L (ref 3.6–11.2)
AST SERPL-CCNC: 20 U/L (ref 10–30)
BACTERIA UR QL AUTO: ABNORMAL /HPF
BARBITURATES UR QL SCN: NEGATIVE
BASOPHILS # BLD AUTO: 0.04 10*3/MM3 (ref 0–0.3)
BASOPHILS NFR BLD AUTO: 0.6 % (ref 0–2)
BENZODIAZ UR QL SCN: NEGATIVE
BILIRUB SERPL-MCNC: 0.6 MG/DL (ref 0.2–1.8)
BILIRUB UR QL STRIP: NEGATIVE
BUN BLD-MCNC: 9 MG/DL (ref 7–21)
BUN/CREAT SERPL: 13.2 (ref 7–25)
BUPRENORPHINE SERPL-MCNC: NEGATIVE NG/ML
CALCIUM SPEC-SCNC: 8.6 MG/DL (ref 7.7–10)
CANNABINOIDS SERPL QL: NEGATIVE
CHLORIDE SERPL-SCNC: 107 MMOL/L (ref 99–112)
CLARITY UR: ABNORMAL
CO2 SERPL-SCNC: 27.5 MMOL/L (ref 24.3–31.9)
COCAINE UR QL: NEGATIVE
COLOR UR: YELLOW
CREAT BLD-MCNC: 0.68 MG/DL (ref 0.43–1.29)
DEPRECATED RDW RBC AUTO: 40.9 FL (ref 37–54)
EOSINOPHIL # BLD AUTO: 0.19 10*3/MM3 (ref 0–0.7)
EOSINOPHIL NFR BLD AUTO: 3.1 % (ref 0–5)
ERYTHROCYTE [DISTWIDTH] IN BLOOD BY AUTOMATED COUNT: 13.2 % (ref 11.5–14.5)
GFR SERPL CREATININE-BSD FRML MDRD: 102 ML/MIN/1.73
GLOBULIN UR ELPH-MCNC: 2.4 GM/DL
GLUCOSE BLD-MCNC: 135 MG/DL (ref 70–110)
GLUCOSE UR STRIP-MCNC: NEGATIVE MG/DL
HCT VFR BLD AUTO: 38.2 % (ref 37–47)
HGB BLD-MCNC: 12.4 G/DL (ref 12–16)
HGB UR QL STRIP.AUTO: NEGATIVE
HYALINE CASTS UR QL AUTO: ABNORMAL /LPF
IMM GRANULOCYTES # BLD: 0.01 10*3/MM3 (ref 0–0.03)
IMM GRANULOCYTES NFR BLD: 0.2 % (ref 0–0.5)
KETONES UR QL STRIP: ABNORMAL
LEUKOCYTE ESTERASE UR QL STRIP.AUTO: ABNORMAL
LYMPHOCYTES # BLD AUTO: 2.46 10*3/MM3 (ref 1–3)
LYMPHOCYTES NFR BLD AUTO: 39.7 % (ref 21–51)
MCH RBC QN AUTO: 28.2 PG (ref 27–33)
MCHC RBC AUTO-ENTMCNC: 32.5 G/DL (ref 33–37)
MCV RBC AUTO: 86.8 FL (ref 80–94)
METHADONE UR QL SCN: NEGATIVE
MONOCYTES # BLD AUTO: 0.37 10*3/MM3 (ref 0.1–0.9)
MONOCYTES NFR BLD AUTO: 6 % (ref 0–10)
NEUTROPHILS # BLD AUTO: 3.12 10*3/MM3 (ref 1.4–6.5)
NEUTROPHILS NFR BLD AUTO: 50.4 % (ref 30–70)
NITRITE UR QL STRIP: POSITIVE
OPIATES UR QL: NEGATIVE
OSMOLALITY SERPL CALC.SUM OF ELEC: 274.5 MOSM/KG (ref 273–305)
OXYCODONE UR QL SCN: NEGATIVE
PCP UR QL SCN: NEGATIVE
PH UR STRIP.AUTO: 6 [PH] (ref 5–8)
PLATELET # BLD AUTO: 180 10*3/MM3 (ref 130–400)
PMV BLD AUTO: 11.2 FL (ref 6–10)
POTASSIUM BLD-SCNC: 3.6 MMOL/L (ref 3.5–5.3)
PROT SERPL-MCNC: 6.4 G/DL (ref 6–8)
PROT UR QL STRIP: NEGATIVE
RBC # BLD AUTO: 4.4 10*6/MM3 (ref 4.2–5.4)
RBC # UR: ABNORMAL /HPF
REF LAB TEST METHOD: ABNORMAL
SODIUM BLD-SCNC: 137 MMOL/L (ref 135–153)
SP GR UR STRIP: 1.02 (ref 1–1.03)
SQUAMOUS #/AREA URNS HPF: ABNORMAL /HPF
T4 FREE SERPL-MCNC: 1.34 NG/DL (ref 0.89–1.76)
TSH SERPL DL<=0.05 MIU/L-ACNC: 0.67 MIU/ML (ref 0.55–4.78)
UROBILINOGEN UR QL STRIP: ABNORMAL
WBC NRBC COR # BLD: 6.19 10*3/MM3 (ref 4.5–12.5)
WBC UR QL AUTO: ABNORMAL /HPF

## 2018-12-02 PROCEDURE — 80307 DRUG TEST PRSMV CHEM ANLYZR: CPT | Performed by: EMERGENCY MEDICINE

## 2018-12-02 PROCEDURE — 84443 ASSAY THYROID STIM HORMONE: CPT | Performed by: EMERGENCY MEDICINE

## 2018-12-02 PROCEDURE — 85025 COMPLETE CBC W/AUTO DIFF WBC: CPT | Performed by: EMERGENCY MEDICINE

## 2018-12-02 PROCEDURE — 25010000002 CEFTRIAXONE PER 250 MG: Performed by: NURSE PRACTITIONER

## 2018-12-02 PROCEDURE — 81001 URINALYSIS AUTO W/SCOPE: CPT | Performed by: EMERGENCY MEDICINE

## 2018-12-02 PROCEDURE — 84439 ASSAY OF FREE THYROXINE: CPT | Performed by: EMERGENCY MEDICINE

## 2018-12-02 PROCEDURE — 96372 THER/PROPH/DIAG INJ SC/IM: CPT

## 2018-12-02 PROCEDURE — 99283 EMERGENCY DEPT VISIT LOW MDM: CPT

## 2018-12-02 PROCEDURE — 80053 COMPREHEN METABOLIC PANEL: CPT | Performed by: EMERGENCY MEDICINE

## 2018-12-02 RX ORDER — CEFTRIAXONE 1 G/1
1000 INJECTION, POWDER, FOR SOLUTION INTRAMUSCULAR; INTRAVENOUS ONCE
Status: COMPLETED | OUTPATIENT
Start: 2018-12-02 | End: 2018-12-02

## 2018-12-02 RX ORDER — LIDOCAINE HYDROCHLORIDE 10 MG/ML
2.1 INJECTION, SOLUTION EPIDURAL; INFILTRATION; INTRACAUDAL; PERINEURAL ONCE
Status: COMPLETED | OUTPATIENT
Start: 2018-12-02 | End: 2018-12-02

## 2018-12-02 RX ORDER — NITROFURANTOIN 25; 75 MG/1; MG/1
100 CAPSULE ORAL 2 TIMES DAILY
Qty: 14 CAPSULE | Refills: 0 | Status: SHIPPED | OUTPATIENT
Start: 2018-12-02 | End: 2018-12-09

## 2018-12-02 RX ADMIN — CEFTRIAXONE 1000 MG: 1 INJECTION, POWDER, FOR SOLUTION INTRAMUSCULAR; INTRAVENOUS at 18:20

## 2018-12-02 RX ADMIN — LIDOCAINE HYDROCHLORIDE 2.1 ML: 10 INJECTION, SOLUTION EPIDURAL; INFILTRATION; INTRACAUDAL; PERINEURAL at 18:20

## 2018-12-02 NOTE — DISCHARGE INSTRUCTIONS
Call one of the offices below to establish a primary care provider.  If you are unable to get an appointment and feel it is an emergency and need to be seen immediately please return to the Emergency Department.    Call one of the office below to set up a primary care provider.    Dr. Kenneth Nieves                                                                                                       [address] [facility] [address]  [phone]    Dr. Moon, Dr. KRISTIAN Herring, Dr. LEILANI Herring ([facility])  [address] [facility] [address]  [phone]    Dr. Sharpe, Dr. Sauceda, Dr. Petty ([facility])  [address] [facility] [address]  [phone]    Dr. Kitchen  [address] [facility] [address]  [phone]    Dr. Marie, Dr. Holman, Dr. Barnett, Dr. Cota ([facility])  [address] [place] [address] [address]  [facility] [address]  [phone]    Dr. Samira Linder  [address] [facility] [place] [address]  [phone]    Dr. Uma Hazel  [address]   [address]  [facility] [address]  [phone]    Dr. Nieves  [address] [facility] [address]  [phone]    Dr. Maldonado, Dr. Carvajal  [address] [facility] [place] [address]  [phone]    Dr. Sheets  [address]                                                              [address]  [facility] [address]  [phone]    Dr. Johnson  [address] [facility] [id number] [phone]    Dr. Kristyn Streeter  [address]  [address]  [facility] [address]  [phone]

## 2018-12-03 NOTE — ED PROVIDER NOTES
Subjective     History provided by:  Patient  Weakness - Generalized   Severity:  Moderate  Onset quality:  Gradual  Timing:  Constant  Progression:  Worsening  Chronicity:  New  Relieved by:  None tried  Worsened by:  Nothing  Ineffective treatments:  None tried  Associated symptoms: no abdominal pain, no chest pain, no difficulty walking, no dysuria, no fever, no foul-smelling urine, no nausea and no urgency        Review of Systems   Constitutional: Negative.  Negative for fever.   HENT: Negative.    Respiratory: Negative.    Cardiovascular: Negative.  Negative for chest pain.   Gastrointestinal: Negative.  Negative for abdominal pain and nausea.   Endocrine: Negative.    Genitourinary: Negative.  Negative for dysuria and urgency.   Skin: Negative.    Psychiatric/Behavioral: Negative.    All other systems reviewed and are negative.      Past Medical History:   Diagnosis Date   • Fracture, radius and ulna, proximal     right       Allergies   Allergen Reactions   • Codeine Unknown (See Comments)     Pt had reaction in childhood, unsure of reaction       Past Surgical History:   Procedure Laterality Date   •  SECTION      x 3   • SALPINGECTOMY     • TONSILLECTOMY     • WISDOM TOOTH EXTRACTION         History reviewed. No pertinent family history.    Social History     Socioeconomic History   • Marital status:      Spouse name: Not on file   • Number of children: Not on file   • Years of education: Not on file   • Highest education level: Not on file   Tobacco Use   • Smoking status: Current Every Day Smoker     Packs/day: 1.00     Years: 10.00     Pack years: 10.00     Types: Cigarettes   • Smokeless tobacco: Never Used   Substance and Sexual Activity   • Alcohol use: No   • Drug use: No   • Sexual activity: Defer           Objective   Physical Exam   Constitutional: She is oriented to person, place, and time. She appears well-developed and well-nourished. No distress.   HENT:   Head: Normocephalic  and atraumatic.   Right Ear: External ear normal.   Left Ear: External ear normal.   Nose: Nose normal.   Eyes: Conjunctivae and EOM are normal. Pupils are equal, round, and reactive to light.   Neck: Normal range of motion. Neck supple. No JVD present. No tracheal deviation present.   Cardiovascular: Normal rate, regular rhythm and normal heart sounds.   No murmur heard.  Pulmonary/Chest: Effort normal and breath sounds normal. No respiratory distress. She has no wheezes.   Abdominal: Soft. Bowel sounds are normal. There is no tenderness.   Musculoskeletal: Normal range of motion. She exhibits no edema or deformity.   Neurological: She is alert and oriented to person, place, and time. No cranial nerve deficit.   Skin: Skin is warm and dry. No rash noted. She is not diaphoretic. No erythema. No pallor.   Psychiatric: She has a normal mood and affect. Her behavior is normal. Thought content normal.   Nursing note and vitals reviewed.      Procedures           ED Course                  MDM      Final diagnoses:   Acute UTI            Brittny Ojeda, APRN  12/02/18 7791

## 2019-03-06 ENCOUNTER — HOSPITAL ENCOUNTER (EMERGENCY)
Facility: HOSPITAL | Age: 29
Discharge: HOME | End: 2019-03-06
Admitting: EMERGENCY MEDICINE

## 2019-03-06 VITALS
HEIGHT: 61 IN | BODY MASS INDEX: 23.98 KG/M2 | TEMPERATURE: 98.5 F | SYSTOLIC BLOOD PRESSURE: 108 MMHG | DIASTOLIC BLOOD PRESSURE: 61 MMHG | OXYGEN SATURATION: 98 % | RESPIRATION RATE: 20 BRPM | HEART RATE: 74 BPM | WEIGHT: 127 LBS

## 2019-03-06 DIAGNOSIS — Z20.828 EXPOSURE TO THE FLU: ICD-10-CM

## 2019-03-06 DIAGNOSIS — J06.9 UPPER RESPIRATORY TRACT INFECTION, UNSPECIFIED TYPE: Primary | ICD-10-CM

## 2019-03-06 LAB
FLUAV AG NPH QL: NEGATIVE
FLUBV AG NPH QL IA: NEGATIVE
S PYO AG THROAT QL: NEGATIVE

## 2019-03-06 PROCEDURE — 99283 EMERGENCY DEPT VISIT LOW MDM: CPT

## 2019-03-06 PROCEDURE — 87880 STREP A ASSAY W/OPTIC: CPT | Performed by: NURSE PRACTITIONER

## 2019-03-06 PROCEDURE — 87081 CULTURE SCREEN ONLY: CPT | Performed by: NURSE PRACTITIONER

## 2019-03-06 PROCEDURE — 87804 INFLUENZA ASSAY W/OPTIC: CPT | Performed by: NURSE PRACTITIONER

## 2019-03-06 RX ORDER — AZITHROMYCIN 250 MG/1
TABLET, FILM COATED ORAL
Qty: 6 TABLET | Refills: 0 | Status: SHIPPED | OUTPATIENT
Start: 2019-03-06 | End: 2020-09-21

## 2019-03-06 RX ORDER — OSELTAMIVIR PHOSPHATE 75 MG/1
75 CAPSULE ORAL DAILY
Qty: 10 CAPSULE | Refills: 0 | Status: SHIPPED | OUTPATIENT
Start: 2019-03-06 | End: 2019-03-16

## 2019-03-06 RX ORDER — BENZONATATE 200 MG/1
200 CAPSULE ORAL 3 TIMES DAILY PRN
Qty: 20 CAPSULE | Refills: 0 | Status: SHIPPED | OUTPATIENT
Start: 2019-03-06 | End: 2020-09-21

## 2019-03-06 NOTE — ED PROVIDER NOTES
Subjective     History provided by:  Patient   used: No    URI   Presenting symptoms: congestion, cough and fever    Severity:  Moderate  Onset quality:  Sudden  Duration:  2 days  Timing:  Constant  Progression:  Waxing and waning  Chronicity:  New  Relieved by:  Nothing  Worsened by:  Nothing  Ineffective treatments:  None tried  Associated symptoms: sinus pain    Risk factors: sick contacts    Risk factors: not elderly, no chronic cardiac disease, no chronic kidney disease, no chronic respiratory disease, no diabetes mellitus, no immunosuppression, no recent illness and no recent travel        Review of Systems   Constitutional: Positive for fever.   HENT: Positive for congestion and sinus pain.    Eyes: Negative.    Respiratory: Positive for cough.    Cardiovascular: Negative.    Gastrointestinal: Negative.    Endocrine: Negative.    Genitourinary: Negative.    Musculoskeletal: Negative.    Skin: Negative.    Allergic/Immunologic: Negative.    Neurological: Negative.    Hematological: Negative.    Psychiatric/Behavioral: Negative.        Past Medical History:   Diagnosis Date   • Fracture, radius and ulna, proximal     right       Allergies   Allergen Reactions   • Codeine Unknown (See Comments)     Pt had reaction in childhood, unsure of reaction       Past Surgical History:   Procedure Laterality Date   •  SECTION      x 3   • ORIF ULNA/RADIUS FRACTURES Right 3/27/2018    Procedure: ULNA/RADIUS OPEN REDUCTION INTERNAL FIXATION;  Surgeon: Benja Keene MD;  Location: [facility];  Service: Orthopedics   • SALPINGECTOMY     • TONSILLECTOMY     • WISDOM TOOTH EXTRACTION         No family history on file.    Social History     Socioeconomic History   • Marital status:      Spouse name: Not on file   • Number of children: Not on file   • Years of education: Not on file   • Highest education level: Not on file   Tobacco Use   • Smoking status: Current Every Day Smoker      Packs/day: 1.00     Years: 10.00     Pack years: 10.00     Types: Cigarettes   • Smokeless tobacco: Never Used   Substance and Sexual Activity   • Alcohol use: No   • Drug use: No   • Sexual activity: Defer           Objective   Physical Exam   Constitutional: She is oriented to person, place, and time. She appears well-developed and well-nourished.   HENT:   Head: Normocephalic.   Right Ear: External ear normal.   Left Ear: External ear normal.   Mild posterior pharyngeal erythema   Eyes: Conjunctivae and EOM are normal. Pupils are equal, round, and reactive to light.   Neck: Normal range of motion. Neck supple.   Cardiovascular: Normal rate, regular rhythm, normal heart sounds and intact distal pulses.   Pulmonary/Chest: Effort normal and breath sounds normal.   Abdominal: Soft. Bowel sounds are normal.   Musculoskeletal: Normal range of motion.   Neurological: She is alert and oriented to person, place, and time.   Skin: Skin is warm and dry. Capillary refill takes less than 2 seconds.   Psychiatric: She has a normal mood and affect. Her behavior is normal. Thought content normal.   Nursing note and vitals reviewed.      Procedures           ED Course                  MDM      Final diagnoses:   Upper respiratory tract infection, unspecified type   Exposure to the flu            Bong Griffiths, APRN  03/06/19 2119

## 2019-03-07 LAB — BACTERIA SPEC AEROBE CULT: NORMAL

## 2020-09-14 ENCOUNTER — APPOINTMENT (OUTPATIENT)
Dept: GENERAL RADIOLOGY | Facility: HOSPITAL | Age: 30
End: 2020-09-14

## 2020-09-14 ENCOUNTER — APPOINTMENT (OUTPATIENT)
Dept: CT IMAGING | Facility: HOSPITAL | Age: 30
End: 2020-09-14

## 2020-09-14 ENCOUNTER — HOSPITAL ENCOUNTER (EMERGENCY)
Facility: HOSPITAL | Age: 30
Discharge: HOME | End: 2020-09-15
Attending: EMERGENCY MEDICINE | Admitting: FAMILY MEDICINE

## 2020-09-14 DIAGNOSIS — R20.2 PARESTHESIAS: Primary | ICD-10-CM

## 2020-09-14 LAB
6-ACETYL MORPHINE: NEGATIVE
ALBUMIN SERPL-MCNC: 4.08 G/DL (ref 3.5–5.2)
ALBUMIN/GLOB SERPL: 1.8 G/DL
ALP SERPL-CCNC: 55 U/L (ref 39–117)
ALT SERPL W P-5'-P-CCNC: 7 U/L (ref 1–33)
AMPHET+METHAMPHET UR QL: NEGATIVE
ANION GAP SERPL CALCULATED.3IONS-SCNC: 8.4 MMOL/L (ref 5–15)
AST SERPL-CCNC: 14 U/L (ref 1–32)
BARBITURATES UR QL SCN: NEGATIVE
BASOPHILS # BLD AUTO: 0.04 10*3/MM3 (ref 0–0.2)
BASOPHILS NFR BLD AUTO: 0.5 % (ref 0–1.5)
BENZODIAZ UR QL SCN: NEGATIVE
BILIRUB SERPL-MCNC: 0.2 MG/DL (ref 0–1.2)
BILIRUB UR QL STRIP: NEGATIVE
BUN SERPL-MCNC: 7 MG/DL (ref 6–20)
BUN/CREAT SERPL: 13 (ref 7–25)
BUPRENORPHINE SERPL-MCNC: NEGATIVE NG/ML
CALCIUM SPEC-SCNC: 9 MG/DL (ref 8.6–10.5)
CANNABINOIDS SERPL QL: NEGATIVE
CHLORIDE SERPL-SCNC: 106 MMOL/L (ref 98–107)
CLARITY UR: ABNORMAL
CO2 SERPL-SCNC: 26.6 MMOL/L (ref 22–29)
COCAINE UR QL: NEGATIVE
COLOR UR: YELLOW
CREAT SERPL-MCNC: 0.54 MG/DL (ref 0.57–1)
DEPRECATED RDW RBC AUTO: 41 FL (ref 37–54)
EOSINOPHIL # BLD AUTO: 0.2 10*3/MM3 (ref 0–0.4)
EOSINOPHIL NFR BLD AUTO: 2.7 % (ref 0.3–6.2)
ERYTHROCYTE [DISTWIDTH] IN BLOOD BY AUTOMATED COUNT: 13 % (ref 12.3–15.4)
ETHANOL BLD-MCNC: <10 MG/DL (ref 0–10)
ETHANOL UR QL: <0.01 %
GFR SERPL CREATININE-BSD FRML MDRD: 132 ML/MIN/1.73
GLOBULIN UR ELPH-MCNC: 2.2 GM/DL
GLUCOSE SERPL-MCNC: 99 MG/DL (ref 65–99)
GLUCOSE UR STRIP-MCNC: NEGATIVE MG/DL
HCG SERPL QL: NEGATIVE
HCT VFR BLD AUTO: 36.5 % (ref 34–46.6)
HGB BLD-MCNC: 11.7 G/DL (ref 12–15.9)
HGB UR QL STRIP.AUTO: NEGATIVE
IMM GRANULOCYTES # BLD AUTO: 0.02 10*3/MM3 (ref 0–0.05)
IMM GRANULOCYTES NFR BLD AUTO: 0.3 % (ref 0–0.5)
KETONES UR QL STRIP: NEGATIVE
LEUKOCYTE ESTERASE UR QL STRIP.AUTO: NEGATIVE
LYMPHOCYTES # BLD AUTO: 3.1 10*3/MM3 (ref 0.7–3.1)
LYMPHOCYTES NFR BLD AUTO: 42.1 % (ref 19.6–45.3)
MAGNESIUM SERPL-MCNC: 2 MG/DL (ref 1.6–2.6)
MCH RBC QN AUTO: 27.9 PG (ref 26.6–33)
MCHC RBC AUTO-ENTMCNC: 32.1 G/DL (ref 31.5–35.7)
MCV RBC AUTO: 86.9 FL (ref 79–97)
METHADONE UR QL SCN: NEGATIVE
MONOCYTES # BLD AUTO: 0.37 10*3/MM3 (ref 0.1–0.9)
MONOCYTES NFR BLD AUTO: 5 % (ref 5–12)
NEUTROPHILS NFR BLD AUTO: 3.64 10*3/MM3 (ref 1.7–7)
NEUTROPHILS NFR BLD AUTO: 49.4 % (ref 42.7–76)
NITRITE UR QL STRIP: NEGATIVE
NRBC BLD AUTO-RTO: 0 /100 WBC (ref 0–0.2)
OPIATES UR QL: NEGATIVE
OXYCODONE UR QL SCN: NEGATIVE
PCP UR QL SCN: NEGATIVE
PH UR STRIP.AUTO: 7.5 [PH] (ref 5–8)
PLATELET # BLD AUTO: 173 10*3/MM3 (ref 140–450)
PMV BLD AUTO: 10.8 FL (ref 6–12)
POTASSIUM SERPL-SCNC: 3.6 MMOL/L (ref 3.5–5.2)
PROT SERPL-MCNC: 6.3 G/DL (ref 6–8.5)
PROT UR QL STRIP: NEGATIVE
RBC # BLD AUTO: 4.2 10*6/MM3 (ref 3.77–5.28)
SODIUM SERPL-SCNC: 141 MMOL/L (ref 136–145)
SP GR UR STRIP: 1.02 (ref 1–1.03)
UROBILINOGEN UR QL STRIP: ABNORMAL
WBC # BLD AUTO: 7.37 10*3/MM3 (ref 3.4–10.8)

## 2020-09-14 PROCEDURE — 93005 ELECTROCARDIOGRAM TRACING: CPT | Performed by: PHYSICIAN ASSISTANT

## 2020-09-14 PROCEDURE — 71045 X-RAY EXAM CHEST 1 VIEW: CPT

## 2020-09-14 PROCEDURE — 84703 CHORIONIC GONADOTROPIN ASSAY: CPT | Performed by: PHYSICIAN ASSISTANT

## 2020-09-14 PROCEDURE — 93010 ELECTROCARDIOGRAM REPORT: CPT | Performed by: INTERNAL MEDICINE

## 2020-09-14 PROCEDURE — 80307 DRUG TEST PRSMV CHEM ANLYZR: CPT | Performed by: PHYSICIAN ASSISTANT

## 2020-09-14 PROCEDURE — 99283 EMERGENCY DEPT VISIT LOW MDM: CPT

## 2020-09-14 PROCEDURE — 81003 URINALYSIS AUTO W/O SCOPE: CPT | Performed by: PHYSICIAN ASSISTANT

## 2020-09-14 PROCEDURE — 83735 ASSAY OF MAGNESIUM: CPT | Performed by: PHYSICIAN ASSISTANT

## 2020-09-14 PROCEDURE — 96365 THER/PROPH/DIAG IV INF INIT: CPT

## 2020-09-14 PROCEDURE — 70450 CT HEAD/BRAIN W/O DYE: CPT | Performed by: RADIOLOGY

## 2020-09-14 PROCEDURE — 70450 CT HEAD/BRAIN W/O DYE: CPT

## 2020-09-14 PROCEDURE — 80053 COMPREHEN METABOLIC PANEL: CPT | Performed by: PHYSICIAN ASSISTANT

## 2020-09-14 PROCEDURE — 85025 COMPLETE CBC W/AUTO DIFF WBC: CPT | Performed by: PHYSICIAN ASSISTANT

## 2020-09-14 RX ORDER — SODIUM CHLORIDE 0.9 % (FLUSH) 0.9 %
10 SYRINGE (ML) INJECTION AS NEEDED
Status: DISCONTINUED | OUTPATIENT
Start: 2020-09-14 | End: 2020-09-15 | Stop reason: HOSPADM

## 2020-09-15 ENCOUNTER — APPOINTMENT (OUTPATIENT)
Dept: MRI IMAGING | Facility: HOSPITAL | Age: 30
End: 2020-09-15

## 2020-09-15 ENCOUNTER — TELEPHONE (OUTPATIENT)
Dept: NEUROLOGY | Facility: CLINIC | Age: 30
End: 2020-09-15

## 2020-09-15 VITALS
BODY MASS INDEX: 25.76 KG/M2 | HEART RATE: 70 BPM | TEMPERATURE: 98.1 F | RESPIRATION RATE: 20 BRPM | OXYGEN SATURATION: 100 % | DIASTOLIC BLOOD PRESSURE: 74 MMHG | HEIGHT: 62 IN | WEIGHT: 140 LBS | SYSTOLIC BLOOD PRESSURE: 128 MMHG

## 2020-09-15 PROCEDURE — 25010000002 GADOTERATE MEGLUMINE 7.5 MMOL/15ML SOLUTION: Performed by: FAMILY MEDICINE

## 2020-09-15 PROCEDURE — 70552 MRI BRAIN STEM W/DYE: CPT

## 2020-09-15 PROCEDURE — 25010000003 METHYLPREDNISOLONE PER 125 MG: Performed by: PHYSICIAN ASSISTANT

## 2020-09-15 PROCEDURE — 72156 MRI NECK SPINE W/O & W/DYE: CPT

## 2020-09-15 PROCEDURE — A9575 INJ GADOTERATE MEGLUMI 0.1ML: HCPCS | Performed by: FAMILY MEDICINE

## 2020-09-15 PROCEDURE — 96365 THER/PROPH/DIAG IV INF INIT: CPT

## 2020-09-15 RX ORDER — GADOTERATE MEGLUMINE 376.9 MG/ML
13 INJECTION INTRAVENOUS
Status: COMPLETED | OUTPATIENT
Start: 2020-09-15 | End: 2020-09-15

## 2020-09-15 RX ORDER — METHYLPREDNISOLONE SODIUM SUCCINATE 1 G/16ML
1000 INJECTION, POWDER, LYOPHILIZED, FOR SOLUTION INTRAMUSCULAR; INTRAVENOUS ONCE
Status: DISCONTINUED | OUTPATIENT
Start: 2020-09-15 | End: 2020-09-15

## 2020-09-15 RX ADMIN — GADOTERATE MEGLUMINE 13 ML: 376.9 INJECTION, SOLUTION INTRAVENOUS at 01:15

## 2020-09-15 NOTE — TELEPHONE ENCOUNTER
PT WAS SEEN IN  ED YESTERDAY 9-14-20 FOR Paresthesias AND IS TO FOLLOW UP WITH DR. HENRY IN 1 DAY, I LOOKED AND THE 1ST AVAILABILITY IS IN October, PLEASE ADVISE. YOU CAN REACH HER [phone]

## 2020-09-15 NOTE — ED NOTES
Called [facility] and talked to Alba. She is paging Dr Mir for Maria Elena and will call us back.     Maria Elena Conway  09/15/20 2630

## 2020-09-15 NOTE — ED PROVIDER NOTES
Subjective   This is a 29 year old female patient who presents to the ER with chief complaint of numbness. Symptoms started on Saturday and included tingling sensation in the lateral left arm and left leg. Today, it extended into the left side of the face. No history of recent injury. No associated headache, neck pain, dizziness, facial asymmetry or slurred speech. No PMH. No drug or alcohol abuse.           Review of Systems   Constitutional: Negative.  Negative for fever.   HENT: Negative.    Respiratory: Negative.    Cardiovascular: Negative.  Negative for chest pain.   Gastrointestinal: Negative.  Negative for abdominal pain.   Endocrine: Negative.    Genitourinary: Negative.  Negative for dysuria.   Skin: Negative.    Neurological: Positive for numbness. Negative for dizziness, tremors, seizures, syncope, facial asymmetry, speech difficulty, weakness, light-headedness and headaches.   Psychiatric/Behavioral: Negative.    All other systems reviewed and are negative.      Past Medical History:   Diagnosis Date   • Fracture, radius and ulna, proximal     right       Allergies   Allergen Reactions   • Codeine Unknown (See Comments)     Pt had reaction in childhood, unsure of reaction       Past Surgical History:   Procedure Laterality Date   •  SECTION      x 3   • ORIF ULNA/RADIUS FRACTURES Right 3/27/2018    Procedure: ULNA/RADIUS OPEN REDUCTION INTERNAL FIXATION;  Surgeon: Benja Keene MD;  Location: [facility];  Service: Orthopedics   • SALPINGECTOMY     • TONSILLECTOMY     • WISDOM TOOTH EXTRACTION         No family history on file.    Social History     Socioeconomic History   • Marital status:      Spouse name: Not on file   • Number of children: Not on file   • Years of education: Not on file   • Highest education level: Not on file   Tobacco Use   • Smoking status: Current Every Day Smoker     Packs/day: 1.00     Years: 10.00     Pack years: 10.00     Types: Cigarettes   •  Smokeless tobacco: Never Used   Substance and Sexual Activity   • Alcohol use: No   • Drug use: No   • Sexual activity: Defer           Objective   Physical Exam  Vitals signs and nursing note reviewed.   Constitutional:       General: She is not in acute distress.     Appearance: She is well-developed. She is not diaphoretic.   HENT:      Head: Normocephalic and atraumatic.      Right Ear: External ear normal.      Left Ear: External ear normal.      Nose: Nose normal.   Eyes:      Conjunctiva/sclera: Conjunctivae normal.      Pupils: Pupils are equal, round, and reactive to light.   Neck:      Musculoskeletal: Normal range of motion and neck supple.      Vascular: No JVD.      Trachea: No tracheal deviation.   Cardiovascular:      Rate and Rhythm: Normal rate and regular rhythm.      Heart sounds: Normal heart sounds. No murmur.   Pulmonary:      Effort: Pulmonary effort is normal. No respiratory distress.      Breath sounds: Normal breath sounds. No wheezing.   Abdominal:      General: Bowel sounds are normal.      Palpations: Abdomen is soft.      Tenderness: There is no abdominal tenderness.   Musculoskeletal: Normal range of motion.         General: No deformity.   Skin:     General: Skin is warm and dry.      Coloration: Skin is not pale.      Findings: No erythema or rash.   Neurological:      Mental Status: She is alert and oriented to person, place, and time.      Cranial Nerves: No cranial nerve deficit.      Sensory: No sensory deficit.      Motor: No weakness.      Coordination: Coordination normal.      Gait: Gait normal.      Deep Tendon Reflexes: Reflexes normal.      Comments: Normal EOMs and pupillary reflexes bilaterally. Can move all 4 extremities in full ROM. 5/5  and plantar flexion strength bilaterally. No facial asymmetry or slurred speech.    Psychiatric:         Behavior: Behavior normal.         Thought Content: Thought content normal.         Procedures           ED Course  ED Course  as of Sep 15 0356   Mon Sep 14, 2020   2143 IMPRESSION:     1. No evidence of an acute ischemic event  2. No parenchymal mass, hemorrhage, or midline shift  3. The results were relayed to the emergency department at 8:03 PM     This report was finalized on 9/14/2020 8:04 PM by Dr. Ccahorro Brewer MD.   CT Head Without Contrast Stroke Protocol [MM]   3254 IMPRESSION:  No acute cardiopulmonary findings.     Signer Name: Fuad Ibrahim MD   Signed: 9/14/2020 11:10 PM   Workstation Name: [id number]    [facility]   XR Chest 1 View [MM]   6173 Signed out to Maria Elena Corrales PA-C     [MM]   Tue Sep 15, 2020   0156 IMPRESSION:  Multiple periventricular and juxtacortical lesions are seen, typical for multiple sclerosis. A prominent plaque in the right parietal centrum semiovale shows contrast enhancement consistent with active demyelination.     Signer Name: Fuad Ibrahim MD   Signed: 9/15/2020 1:48 AM   MRI Brain With Contrast [MH]   0156 IMPRESSION:  There is minimal disc bulging at C5-6 and C6-7. Otherwise negative.     Signer Name: Fuad Ibrahim MD   Signed: 9/15/2020 1:45 AM   MRI Cervical Spine With & Without Contrast [MH]   0242 Spoke with  , neurologist at [facility], she recommends giving 1 g of Solu-Medrol IV and follow-up with MS specialist, Dr. Berrios in outpatient clinic.    [MH]   0355 Discussed plan of care with patient.  Advised if symptoms worsen to return to the ED.  Advised to keep her follow-up with the neurologist at [facility].    [MH]      ED Course User Index  [MH] Maria Elena Corrales PA-C  [MM] Angelia Wellington PA                                           [facility]    Final diagnoses:   Paresthesias            Maria Elena Corrales PA-C  09/15/20 0356

## 2020-09-21 ENCOUNTER — LAB (OUTPATIENT)
Dept: LAB | Facility: HOSPITAL | Age: 30
End: 2020-09-21

## 2020-09-21 ENCOUNTER — OFFICE VISIT (OUTPATIENT)
Dept: NEUROLOGY | Facility: CLINIC | Age: 30
End: 2020-09-21

## 2020-09-21 VITALS
OXYGEN SATURATION: 97 % | TEMPERATURE: 97.5 F | HEART RATE: 95 BPM | HEIGHT: 62 IN | WEIGHT: 156 LBS | BODY MASS INDEX: 28.71 KG/M2 | SYSTOLIC BLOOD PRESSURE: 102 MMHG | DIASTOLIC BLOOD PRESSURE: 68 MMHG

## 2020-09-21 DIAGNOSIS — R90.89 MRI OF BRAIN ABNORMAL: ICD-10-CM

## 2020-09-21 DIAGNOSIS — R20.2 NUMBNESS AND TINGLING: ICD-10-CM

## 2020-09-21 DIAGNOSIS — R42 DIZZINESS: ICD-10-CM

## 2020-09-21 DIAGNOSIS — R20.0 NUMBNESS AND TINGLING: ICD-10-CM

## 2020-09-21 PROCEDURE — 80053 COMPREHEN METABOLIC PANEL: CPT

## 2020-09-21 PROCEDURE — 86235 NUCLEAR ANTIGEN ANTIBODY: CPT

## 2020-09-21 PROCEDURE — 85730 THROMBOPLASTIN TIME PARTIAL: CPT

## 2020-09-21 PROCEDURE — 86038 ANTINUCLEAR ANTIBODIES: CPT

## 2020-09-21 PROCEDURE — 85652 RBC SED RATE AUTOMATED: CPT

## 2020-09-21 PROCEDURE — 85597 PHOSPHOLIPID PLTLT NEUTRALIZ: CPT

## 2020-09-21 PROCEDURE — 85613 RUSSELL VIPER VENOM DILUTED: CPT

## 2020-09-21 PROCEDURE — 86225 DNA ANTIBODY NATIVE: CPT

## 2020-09-21 PROCEDURE — 85670 THROMBIN TIME PLASMA: CPT

## 2020-09-21 PROCEDURE — 99243 OFF/OP CNSLTJ NEW/EST LOW 30: CPT | Performed by: NURSE PRACTITIONER

## 2020-09-21 PROCEDURE — 86146 BETA-2 GLYCOPROTEIN ANTIBODY: CPT

## 2020-09-21 PROCEDURE — 82607 VITAMIN B-12: CPT

## 2020-09-21 PROCEDURE — 86431 RHEUMATOID FACTOR QUANT: CPT

## 2020-09-21 PROCEDURE — 82746 ASSAY OF FOLIC ACID SERUM: CPT

## 2020-09-21 PROCEDURE — 85598 HEXAGNAL PHOSPH PLTLT NEUTRL: CPT

## 2020-09-21 PROCEDURE — 82306 VITAMIN D 25 HYDROXY: CPT

## 2020-09-21 PROCEDURE — 82164 ANGIOTENSIN I ENZYME TEST: CPT

## 2020-09-21 PROCEDURE — 86255 FLUORESCENT ANTIBODY SCREEN: CPT

## 2020-09-21 PROCEDURE — 86147 CARDIOLIPIN ANTIBODY EA IG: CPT

## 2020-09-21 PROCEDURE — 85610 PROTHROMBIN TIME: CPT

## 2020-09-21 PROCEDURE — 85732 THROMBOPLASTIN TIME PARTIAL: CPT

## 2020-09-21 PROCEDURE — 85025 COMPLETE CBC W/AUTO DIFF WBC: CPT

## 2020-09-21 PROCEDURE — 36415 COLL VENOUS BLD VENIPUNCTURE: CPT

## 2020-09-21 NOTE — PROGRESS NOTES
Subjective   Patient ID: Avani Rm is a 30 y.o. female     Chief Complaint   Patient presents with   • Multiple Sclerosis        History of Present Illness  30 y.o. female referred by [facility] for abnormal MRI.    N/T of the left side of the body for about 2 weeks.   Over the past 3 days the numbness has improved to more of a faint numbness, more on the edge of the foot and pinky. Continues to feel tingles to touch throughout left side of the body.     Moderate fatigue    Heat intolerance severe     Fell twice, having balance difficulties intermittently     When glasses are off she will see twinkles in her vision - this has resolved only happened once for approximately 20 minutes.    Dis-coordination at times when using her left hand, feels as though her fine motor skills have decreased in the left hand.     Denies any pain, headaches, or weakness.    ED Notes from 9/16/20 reviewed and summarized:  Sx started Saturday 9/12 with tingling sensation in the lateral left arm and left leg and  extending to the left side of the face. MRI consistent with MS, she was given 1G IV solu-medrol and discharged to follow up with Neurology.     MRI Brain 9/15/20, my review of films, moderate T2 lesion load with enhancing lesion in the right parietal centrum semiovale.     MRI Cervical spine 9/15/20, my review of films, minimal disc bulge at C5-6, and C6-7. No evidence of MS lesions.     Labs 9/14/20 - CBC, CMP, Mag, UA - NCS     Past Medical History:   Diagnosis Date   • Fracture, radius and ulna, proximal     right     No family history on file.  Social History     Socioeconomic History   • Marital status:      Spouse name: Not on file   • Number of children: Not on file   • Years of education: Not on file   • Highest education level: Not on file   Tobacco Use   • Smoking status: Current Every Day Smoker     Packs/day: 1.00     Years: 10.00     Pack years: 10.00     Types: Cigarettes   • Smokeless tobacco: Never Used  "  Substance and Sexual Activity   • Alcohol use: No   • Drug use: No   • Sexual activity: Defer       Review of Systems   Constitutional: Positive for fatigue. Negative for activity change and unexpected weight change.   HENT: Negative for tinnitus and trouble swallowing.    Eyes: Negative for photophobia and visual disturbance.   Respiratory: Negative for apnea, cough and choking.    Cardiovascular: Negative for leg swelling.   Gastrointestinal: Negative for nausea and vomiting.   Endocrine: Positive for heat intolerance. Negative for cold intolerance.   Genitourinary: Negative for difficulty urinating, frequency, menstrual problem and urgency.   Musculoskeletal: Negative for back pain, gait problem, myalgias and neck pain.   Skin: Negative for color change and rash.   Allergic/Immunologic: Negative for immunocompromised state.   Neurological: Positive for dizziness and numbness. Negative for tremors, seizures, syncope, facial asymmetry, speech difficulty, weakness, light-headedness and headaches.   Hematological: Negative for adenopathy. Does not bruise/bleed easily.   Psychiatric/Behavioral: Negative for behavioral problems, confusion, decreased concentration, hallucinations and sleep disturbance.       Objective     Vitals:    09/21/20 1345   BP: 102/68   Pulse: 95   Temp: 97.5 °F (36.4 °C)   SpO2: 97%   Weight: 70.8 kg (156 lb)   Height: 157.5 cm (62\")       Neurologic Exam     Mental Status   Oriented to person, place, and time.   Registration: recalls 3 of 3 objects. Recall at 5 minutes: recalls 3 of 3 objects. Follows 3 step commands.   Attention: normal. Concentration: normal.   Speech: speech is normal   Level of consciousness: alert  Knowledge: good and consistent with education.   Able to name object. Able to read. Able to repeat. Able to write. Normal comprehension.     Cranial Nerves     CN II   Visual fields full to confrontation.   Visual acuity: normal  Right visual field deficit: none  Left visual " field deficit: none     CN III, IV, VI   Pupils are equal, round, and reactive to light.  Extraocular motions are normal.   Right pupil: Shape: regular. Reactivity: brisk. Consensual response: intact.   Left pupil: Shape: regular. Reactivity: brisk. Consensual response: intact.   Nystagmus: none   Diplopia: none  Ophthalmoparesis: none  Upgaze: normal  Downgaze: normal  Conjugate gaze: present  Vestibulo-ocular reflex: present    CN V   Facial sensation intact.   Right corneal reflex: normal  Left corneal reflex: normal    CN VII   Right facial weakness: none  Left facial weakness: none    CN VIII   Hearing: intact    CN IX, X   Palate: symmetric  Right gag reflex: normal  Left gag reflex: normal    CN XI   Right sternocleidomastoid strength: normal  Left sternocleidomastoid strength: normal    CN XII   Tongue: not atrophic  Fasciculations: absent  Tongue deviation: none    Motor Exam   Muscle bulk: normal  Overall muscle tone: normal  Right arm tone: normal  Left arm tone: normal  Right leg tone: normal  Left leg tone: normal    Strength   Strength 5/5 throughout.     Sensory Exam   Light touch normal.   Proprioception normal.   Pinprick normal.   Lowest sensation to pinprick on left: T11 (tingling down to T11 )    Gait, Coordination, and Reflexes     Coordination   Romberg: positive  Finger to nose coordination: normal  Heel to shin coordination: normal  Tandem walking coordination: abnormal    Tremor   Resting tremor: absent  Intention tremor: absent  Action tremor: absent    Reflexes   Reflexes 2+ except as noted.       Physical Exam  Vitals signs and nursing note reviewed.   Constitutional:       Appearance: Normal appearance.   HENT:      Head: Normocephalic and atraumatic.   Eyes:      Extraocular Movements: Extraocular movements intact and EOM normal.      Pupils: Pupils are equal, round, and reactive to light.   Neck:      Musculoskeletal: Normal range of motion.   Cardiovascular:      Rate and Rhythm:  Normal rate.   Pulmonary:      Effort: Pulmonary effort is normal.   Musculoskeletal: Normal range of motion.   Skin:     General: Skin is warm and dry.      Capillary Refill: Capillary refill takes less than 2 seconds.   Neurological:      General: No focal deficit present.      Mental Status: She is alert and oriented to person, place, and time.      Coordination: Romberg Test abnormal. Finger-Nose-Finger Test and Heel to Shin Test normal.      Gait: Tandem walk abnormal.      Deep Tendon Reflexes: Strength normal.   Psychiatric:         Mood and Affect: Mood normal.         Speech: Speech normal.         Admission on 09/14/2020, Discharged on 09/15/2020   Component Date Value Ref Range Status   • Glucose 09/14/2020 99  65 - 99 mg/dL Final   • BUN 09/14/2020 7  6 - 20 mg/dL Final   • Creatinine 09/14/2020 0.54* 0.57 - 1.00 mg/dL Final   • Sodium 09/14/2020 141  136 - 145 mmol/L Final   • Potassium 09/14/2020 3.6  3.5 - 5.2 mmol/L Final   • Chloride 09/14/2020 106  98 - 107 mmol/L Final   • CO2 09/14/2020 26.6  22.0 - 29.0 mmol/L Final   • Calcium 09/14/2020 9.0  8.6 - 10.5 mg/dL Final   • Total Protein 09/14/2020 6.3  6.0 - 8.5 g/dL Final   • Albumin 09/14/2020 4.08  3.50 - 5.20 g/dL Final   • ALT (SGPT) 09/14/2020 7  1 - 33 U/L Final   • AST (SGOT) 09/14/2020 14  1 - 32 U/L Final   • Alkaline Phosphatase 09/14/2020 55  39 - 117 U/L Final   • Total Bilirubin 09/14/2020 0.2  0.0 - 1.2 mg/dL Final   • eGFR Non African Amer 09/14/2020 132  >60 mL/min/1.73 Final   • Globulin 09/14/2020 2.2  gm/dL Final   • A/G Ratio 09/14/2020 1.8  g/dL Final   • BUN/Creatinine Ratio 09/14/2020 13.0  7.0 - 25.0 Final   • Anion Gap 09/14/2020 8.4  5.0 - 15.0 mmol/L Final   • Color, UA 09/14/2020 Yellow  Yellow, Straw Final   • Appearance, UA 09/14/2020 Turbid* Clear Final   • pH, UA 09/14/2020 7.5  5.0 - 8.0 Final   • Specific Gravity, UA 09/14/2020 1.019  1.005 - 1.030 Final   • Glucose, UA 09/14/2020 Negative  Negative Final   •  Ketones, UA 09/14/2020 Negative  Negative Final   • Bilirubin, UA 09/14/2020 Negative  Negative Final   • Blood, UA 09/14/2020 Negative  Negative Final   • Protein, UA 09/14/2020 Negative  Negative Final   • Leuk Esterase, UA 09/14/2020 Negative  Negative Final   • Nitrite, UA 09/14/2020 Negative  Negative Final   • Urobilinogen, UA 09/14/2020 1.0 E.U./dL  0.2 - 1.0 E.U./dL Final   • Magnesium 09/14/2020 2.0  1.6 - 2.6 mg/dL Final   • HCG Qualitative 09/14/2020 Negative  Negative Final   • WBC 09/14/2020 7.37  3.40 - 10.80 10*3/mm3 Final   • RBC 09/14/2020 4.20  3.77 - 5.28 10*6/mm3 Final   • Hemoglobin 09/14/2020 11.7* 12.0 - 15.9 g/dL Final   • Hematocrit 09/14/2020 36.5  34.0 - 46.6 % Final   • MCV 09/14/2020 86.9  79.0 - 97.0 fL Final   • MCH 09/14/2020 27.9  26.6 - 33.0 pg Final   • MCHC 09/14/2020 32.1  31.5 - 35.7 g/dL Final   • RDW 09/14/2020 13.0  12.3 - 15.4 % Final   • RDW-SD 09/14/2020 41.0  37.0 - 54.0 fl Final   • MPV 09/14/2020 10.8  6.0 - 12.0 fL Final   • Platelets 09/14/2020 173  140 - 450 10*3/mm3 Final   • Neutrophil % 09/14/2020 49.4  42.7 - 76.0 % Final   • Lymphocyte % 09/14/2020 42.1  19.6 - 45.3 % Final   • Monocyte % 09/14/2020 5.0  5.0 - 12.0 % Final   • Eosinophil % 09/14/2020 2.7  0.3 - 6.2 % Final   • Basophil % 09/14/2020 0.5  0.0 - 1.5 % Final   • Immature Grans % 09/14/2020 0.3  0.0 - 0.5 % Final   • Neutrophils, Absolute 09/14/2020 3.64  1.70 - 7.00 10*3/mm3 Final   • Lymphocytes, Absolute 09/14/2020 3.10  0.70 - 3.10 10*3/mm3 Final   • Monocytes, Absolute 09/14/2020 0.37  0.10 - 0.90 10*3/mm3 Final   • Eosinophils, Absolute 09/14/2020 0.20  0.00 - 0.40 10*3/mm3 Final   • Basophils, Absolute 09/14/2020 0.04  0.00 - 0.20 10*3/mm3 Final   • Immature Grans, Absolute 09/14/2020 0.02  0.00 - 0.05 10*3/mm3 Final   • nRBC 09/14/2020 0.0  0.0 - 0.2 /100 WBC Final   • Amphetamine Screen, Urine 09/14/2020 Negative  Negative Final   • Barbiturates Screen, Urine 09/14/2020 Negative  Negative  Final   • Benzodiazepine Screen, Urine 09/14/2020 Negative  Negative Final   • Cocaine Screen, Urine 09/14/2020 Negative  Negative Final   • Methadone Screen, Urine 09/14/2020 Negative  Negative Final   • Opiate Screen 09/14/2020 Negative  Negative Final   • Phencyclidine (PCP), Urine 09/14/2020 Negative  Negative Final   • THC, Screen, Urine 09/14/2020 Negative  Negative Final   • 6-ACETYL MORPHINE 09/14/2020 Negative  Negative Final   • Buprenorphine, Screen, Urine 09/14/2020 Negative  Negative Final   • Oxycodone Screen, Urine 09/14/2020 Negative  Negative Final   • Ethanol 09/14/2020 <10  0 - 10 mg/dL Final   • Ethanol % 09/14/2020 <0.010  % Final         Assessment/Plan     Problem List Items Addressed This Visit        Nervous and Auditory    Numbness and tingling    Relevant Orders    BASSAM by IFA, Reflex 9-biomarkers profile    Angiotensin Converting Enzyme    CBC & Differential    Comprehensive Metabolic Panel    Neuromyelitis Optica (NMO) Auto Antibody, IgG    Rheumatoid Factor    Sedimentation Rate    Sjogren's Antibody, Anti-SS-A / -SS-B    Vitamin B12 & Folate    Vitamin D 25 Hydroxy    Lupus Anticoagulant Panel    MOG FACS, SERUM    MRI Thoracic Spine With & Without Contrast    IR Lumbar Puncture Diagnosis       Other    MRI of brain abnormal    Current Assessment & Plan     MRI Thoracic spine     LP     Labs for MS mimickers     NMO/MOG     F/U after testing     Patient instructed to notify me if sx worsen during workup          Relevant Orders    BASSAM by IFA, Reflex 9-biomarkers profile    Angiotensin Converting Enzyme    CBC & Differential    Comprehensive Metabolic Panel    Neuromyelitis Optica (NMO) Auto Antibody, IgG    Rheumatoid Factor    Sedimentation Rate    Sjogren's Antibody, Anti-SS-A / -SS-B    Vitamin B12 & Folate    Vitamin D 25 Hydroxy    Lupus Anticoagulant Panel    MOG FACS, SERUM    MRI Thoracic Spine With & Without Contrast    IR Lumbar Puncture Diagnosis      Other Visit Diagnoses      Dizziness                 Return for Recheck after MRI and LP .

## 2020-09-22 LAB
25(OH)D3 SERPL-MCNC: 27.9 NG/ML (ref 30–100)
ALBUMIN SERPL-MCNC: 4.6 G/DL (ref 3.5–5.2)
ALBUMIN/GLOB SERPL: 1.7 G/DL
ALP SERPL-CCNC: 61 U/L (ref 39–117)
ALT SERPL W P-5'-P-CCNC: 8 U/L (ref 1–33)
ANION GAP SERPL CALCULATED.3IONS-SCNC: 12.1 MMOL/L (ref 5–15)
AST SERPL-CCNC: 14 U/L (ref 1–32)
BASOPHILS # BLD AUTO: 0.04 10*3/MM3 (ref 0–0.2)
BASOPHILS NFR BLD AUTO: 0.5 % (ref 0–1.5)
BILIRUB SERPL-MCNC: 0.3 MG/DL (ref 0–1.2)
BUN SERPL-MCNC: 10 MG/DL (ref 6–20)
BUN/CREAT SERPL: 16.4 (ref 7–25)
CALCIUM SPEC-SCNC: 9.1 MG/DL (ref 8.6–10.5)
CHLORIDE SERPL-SCNC: 107 MMOL/L (ref 98–107)
CHROMATIN AB SERPL-ACNC: <10 IU/ML (ref 0–14)
CO2 SERPL-SCNC: 19.9 MMOL/L (ref 22–29)
CREAT SERPL-MCNC: 0.61 MG/DL (ref 0.57–1)
DEPRECATED RDW RBC AUTO: 40.2 FL (ref 37–54)
EOSINOPHIL # BLD AUTO: 0.15 10*3/MM3 (ref 0–0.4)
EOSINOPHIL NFR BLD AUTO: 1.9 % (ref 0.3–6.2)
ERYTHROCYTE [DISTWIDTH] IN BLOOD BY AUTOMATED COUNT: 12.5 % (ref 12.3–15.4)
ERYTHROCYTE [SEDIMENTATION RATE] IN BLOOD: 3 MM/HR (ref 0–20)
FOLATE SERPL-MCNC: 12.5 NG/ML (ref 4.78–24.2)
GFR SERPL CREATININE-BSD FRML MDRD: 114 ML/MIN/1.73
GLOBULIN UR ELPH-MCNC: 2.7 GM/DL
GLUCOSE SERPL-MCNC: 89 MG/DL (ref 65–99)
HCT VFR BLD AUTO: 41.5 % (ref 34–46.6)
HGB BLD-MCNC: 13.6 G/DL (ref 12–15.9)
IMM GRANULOCYTES # BLD AUTO: 0.04 10*3/MM3 (ref 0–0.05)
IMM GRANULOCYTES NFR BLD AUTO: 0.5 % (ref 0–0.5)
LYMPHOCYTES # BLD AUTO: 2.88 10*3/MM3 (ref 0.7–3.1)
LYMPHOCYTES NFR BLD AUTO: 36 % (ref 19.6–45.3)
MCH RBC QN AUTO: 28.5 PG (ref 26.6–33)
MCHC RBC AUTO-ENTMCNC: 32.8 G/DL (ref 31.5–35.7)
MCV RBC AUTO: 87 FL (ref 79–97)
MONOCYTES # BLD AUTO: 0.48 10*3/MM3 (ref 0.1–0.9)
MONOCYTES NFR BLD AUTO: 6 % (ref 5–12)
NEUTROPHILS NFR BLD AUTO: 4.4 10*3/MM3 (ref 1.7–7)
NEUTROPHILS NFR BLD AUTO: 55.1 % (ref 42.7–76)
NRBC BLD AUTO-RTO: 0 /100 WBC (ref 0–0.2)
PLATELET # BLD AUTO: 183 10*3/MM3 (ref 140–450)
PMV BLD AUTO: 11.8 FL (ref 6–12)
POTASSIUM SERPL-SCNC: 4.1 MMOL/L (ref 3.5–5.2)
PROT SERPL-MCNC: 7.3 G/DL (ref 6–8.5)
RBC # BLD AUTO: 4.77 10*6/MM3 (ref 3.77–5.28)
SODIUM SERPL-SCNC: 139 MMOL/L (ref 136–145)
VIT B12 BLD-MCNC: 463 PG/ML (ref 211–946)
WBC # BLD AUTO: 7.99 10*3/MM3 (ref 3.4–10.8)

## 2020-09-23 LAB
ACE SERPL-CCNC: 40 U/L (ref 14–82)
ENA SS-A AB SER-ACNC: <0.2 AI (ref 0–0.9)
ENA SS-B AB SER-ACNC: <0.2 AI (ref 0–0.9)

## 2020-09-24 LAB
ANA NUCLEOLAR TITR SER: ABNORMAL {TITER}
ANA SPECKLED TITR SER: ABNORMAL {TITER}
ANA TITR SER IF: POSITIVE {TITER}
CENTROMERE B AB SER-ACNC: <0.2 AI (ref 0–0.9)
CHROMATIN AB SERPL-ACNC: <0.2 AI (ref 0–0.9)
DSDNA AB SER-ACNC: 2 IU/ML (ref 0–9)
ENA JO1 AB SER-ACNC: <0.2 AI (ref 0–0.9)
ENA RNP AB SER-ACNC: 0.4 AI (ref 0–0.9)
ENA SCL70 AB SER-ACNC: <0.2 AI (ref 0–0.9)
ENA SM AB SER-ACNC: <0.2 AI (ref 0–0.9)
LABORATORY COMMENT REPORT: ABNORMAL
Lab: ABNORMAL
Lab: ABNORMAL

## 2020-09-28 ENCOUNTER — HOSPITAL ENCOUNTER (OUTPATIENT)
Dept: GENERAL RADIOLOGY | Facility: HOSPITAL | Age: 30
Discharge: HOME | End: 2020-09-28
Admitting: RADIOLOGY

## 2020-09-28 VITALS
WEIGHT: 150 LBS | TEMPERATURE: 96.8 F | HEART RATE: 74 BPM | HEIGHT: 61 IN | RESPIRATION RATE: 16 BRPM | DIASTOLIC BLOOD PRESSURE: 62 MMHG | SYSTOLIC BLOOD PRESSURE: 112 MMHG | BODY MASS INDEX: 28.32 KG/M2 | OXYGEN SATURATION: 98 %

## 2020-09-28 DIAGNOSIS — R20.0 NUMBNESS AND TINGLING: ICD-10-CM

## 2020-09-28 DIAGNOSIS — R90.89 MRI OF BRAIN ABNORMAL: ICD-10-CM

## 2020-09-28 DIAGNOSIS — R20.2 NUMBNESS AND TINGLING: ICD-10-CM

## 2020-09-28 LAB
APPEARANCE CSF: CLEAR
COLOR CSF: COLORLESS
COLOR SPUN CSF: COLORLESS
CRYPTOC AG CSF QL LA: NEGATIVE
GLUCOSE CSF-MCNC: 62 MG/DL (ref 40–70)
LYMPHOCYTES NFR CSF MANUAL: 90 % (ref 62–100)
MONOCYTES NFR CSF MANUAL: 10 % (ref 0–36)
PROT CSF-MCNC: 37.7 MG/DL (ref 15–45)
RBC # CSF MANUAL: 2 /MM3 (ref 0–5)
SPECIMEN VOL CSF: 10 ML
TUBE # CSF: 1
WBC # CSF MANUAL: 9 /MM3 (ref 0–5)

## 2020-09-28 PROCEDURE — 87798 DETECT AGENT NOS DNA AMP: CPT | Performed by: NURSE PRACTITIONER

## 2020-09-28 PROCEDURE — 82040 ASSAY OF SERUM ALBUMIN: CPT | Performed by: NURSE PRACTITIONER

## 2020-09-28 PROCEDURE — 84157 ASSAY OF PROTEIN OTHER: CPT | Performed by: NURSE PRACTITIONER

## 2020-09-28 PROCEDURE — 89051 BODY FLUID CELL COUNT: CPT | Performed by: NURSE PRACTITIONER

## 2020-09-28 PROCEDURE — 86617 LYME DISEASE ANTIBODY: CPT | Performed by: NURSE PRACTITIONER

## 2020-09-28 PROCEDURE — 87327 CRYPTOCOCCUS NEOFORM AG IA: CPT | Performed by: NURSE PRACTITIONER

## 2020-09-28 PROCEDURE — 82042 OTHER SOURCE ALBUMIN QUAN EA: CPT | Performed by: NURSE PRACTITIONER

## 2020-09-28 PROCEDURE — 82945 GLUCOSE OTHER FLUID: CPT | Performed by: NURSE PRACTITIONER

## 2020-09-28 PROCEDURE — 82784 ASSAY IGA/IGD/IGG/IGM EACH: CPT | Performed by: NURSE PRACTITIONER

## 2020-09-28 PROCEDURE — 87102 FUNGUS ISOLATION CULTURE: CPT | Performed by: NURSE PRACTITIONER

## 2020-09-28 PROCEDURE — 82164 ANGIOTENSIN I ENZYME TEST: CPT | Performed by: NURSE PRACTITIONER

## 2020-09-28 PROCEDURE — 87529 HSV DNA AMP PROBE: CPT | Performed by: NURSE PRACTITIONER

## 2020-09-28 PROCEDURE — 87206 SMEAR FLUORESCENT/ACID STAI: CPT | Performed by: NURSE PRACTITIONER

## 2020-09-28 PROCEDURE — 83916 OLIGOCLONAL BANDS: CPT | Performed by: NURSE PRACTITIONER

## 2020-09-28 PROCEDURE — 87116 MYCOBACTERIA CULTURE: CPT | Performed by: NURSE PRACTITIONER

## 2020-09-28 PROCEDURE — 88112 CYTOPATH CELL ENHANCE TECH: CPT | Performed by: NURSE PRACTITIONER

## 2020-09-28 RX ORDER — LIDOCAINE HYDROCHLORIDE 10 MG/ML
5 INJECTION, SOLUTION EPIDURAL; INFILTRATION; INTRACAUDAL; PERINEURAL ONCE
Status: COMPLETED | OUTPATIENT
Start: 2020-09-28 | End: 2020-09-28

## 2020-09-28 RX ADMIN — LIDOCAINE HYDROCHLORIDE 5 ML: 10 INJECTION, SOLUTION EPIDURAL; INFILTRATION; INTRACAUDAL; PERINEURAL at 10:40

## 2020-09-28 NOTE — POST-PROCEDURE NOTE
Radiology Procedure    Pre-procedure: procedure, risks discussed with patient. Patient indicated understanding and consented to procedure.     Procedure Performed: lumbar puncture     IV Sedation and/or Anesthesia:  No    Complications: none    Preliminary Findings: pending    Specimen Removed: 10cc clear, colorless CSF    Estimated Blood Loss:  0ml    Post-Procedure Diagnosis: pending    Post-Procedure Plan: encourage fluids, bed rest x 2 hours    Standard Discharge Instructions Given:yes     DONALD Harris  09/28/20  10:47 EDT

## 2020-09-29 ENCOUNTER — TELEPHONE (OUTPATIENT)
Dept: INFUSION THERAPY | Facility: HOSPITAL | Age: 30
End: 2020-09-29

## 2020-09-30 LAB
ACE CSF-CCNC: <1.5 U/L (ref 0–2.5)
ALB CSF/SERPL: 5 {RATIO} (ref 0–8)
ALBUMIN CSF-MCNC: 19 MG/DL (ref 11–48)
ALBUMIN SERPL-MCNC: 4 G/DL (ref 3.8–4.8)
HSV1 DNA SPEC QL NAA+PROBE: NEGATIVE
HSV2 DNA SPEC QL NAA+PROBE: NEGATIVE
IGG CSF-MCNC: 10.1 MG/DL (ref 0–8.6)
IGG SERPL-MCNC: 910 MG/DL (ref 586–1602)
IGG SYNTH RATE SER+CSF CALC-MRATE: 37.4 MG/DAY
IGG/ALB CLEAR SER+CSF-RTO: 2.3 (ref 0–0.7)
IGG/ALB CSF: 0.53 {RATIO} (ref 0–0.25)
LAB AP CASE REPORT: NORMAL
OLIGOCLONAL BANDS.IT SER+CSF QL: ABNORMAL
PATH REPORT.FINAL DX SPEC: NORMAL

## 2020-10-01 LAB
APTT HEX PL PPP: 18 SEC
APTT IMM NP PPP: ABNORMAL S
APTT PPP 1:1 SALINE: ABNORMAL S
APTT PPP: 26.2 SEC
B2 GLYCOPROT1 IGA SER-ACNC: <10 SAU
B2 GLYCOPROT1 IGG SER-ACNC: <10 SGU
B2 GLYCOPROT1 IGM SER-ACNC: <10 SMU
CARDIOLIPIN IGG SER IA-ACNC: <10 GPL
CARDIOLIPIN IGM SER IA-ACNC: 15 MPL
CONFIRM APTT: 1.7 SEC
CONFIRM DRVVT: ABNORMAL S
DRVVT SCREEN TO CONFIRM RATIO: ABNORMAL {RATIO}
INR PPP: 1 RATIO
LABORATORY COMMENT REPORT: ABNORMAL
PROTHROMBIN TIME: 10.8 SEC
SCREEN DRVVT: 38.7 SEC
THROMBIN TIME: 16.6 SEC

## 2020-10-02 LAB — EBV DNA SPEC QL NAA+PROBE: NEGATIVE

## 2020-10-05 ENCOUNTER — OFFICE VISIT (OUTPATIENT)
Dept: NEUROLOGY | Facility: CLINIC | Age: 30
End: 2020-10-05

## 2020-10-05 VITALS
HEART RATE: 76 BPM | DIASTOLIC BLOOD PRESSURE: 74 MMHG | SYSTOLIC BLOOD PRESSURE: 116 MMHG | RESPIRATION RATE: 16 BRPM | BODY MASS INDEX: 28.52 KG/M2 | HEIGHT: 62 IN | WEIGHT: 155 LBS

## 2020-10-05 DIAGNOSIS — G35 MULTIPLE SCLEROSIS (HCC): ICD-10-CM

## 2020-10-05 PROCEDURE — 99214 OFFICE O/P EST MOD 30 MIN: CPT | Performed by: NURSE PRACTITIONER

## 2020-10-05 NOTE — ASSESSMENT & PLAN NOTE
Start Tecfidera, given samples     Given instructions on taking with a high fat meal and with an Aspirin 81 mg daily 30 minutes prior to taking Tecfidera.     Given information on MS, completed clinic for baseline metrics     Follow up in 4 weeks

## 2020-10-05 NOTE — PROGRESS NOTES
Subjective   Patient ID: Avani Rm is a 30 y.o. female     Chief Complaint   Patient presents with   • Numbness     2 week F/U         History of Present Illness  30 y.o. female returns in follow up for Abnormal MRI.     LP with 3 OCB, IgG 10.1, IgG synthesis 37.4, protein 37.7    MOG and NMO in process     Labs 9/21/20: Vitamin D 27.9, B12/Folate, CBC, CMP, Sjogrens, RF, Sed rate, ACE - NCS   BASSAM - positive     Patient states all fatigue, numbness, and heat intolerance have gone away, she feels back to normal.    MS clinic completed 10/5/20  25ft timed walk Trial 1 (Regular) : 10.02  25ft timed walk Trial 2  (Fast) : 6.93     Number of falls in the past year:: 2  Right SLS in seconds:: 20  Left SLS in seconds:: 20  Right Hand Trial 1:: 24.35  Right Hand Trial 2:: 20.77  Left Hand Trial 1:: 24.84  Left Hand Trial 2:: 24.62  Dominant Hand?: Right   strength R Hand Trial 1: 58.8   strength R Hand Trial 2: 57.5   strength L Hand Trial 1: 83.7   strength L Hand Trial 2: 75.8  SDMT: 43  Written or verbal?: Written        PBA-CNS-LS: 18  Passed self-questionnaire: Yes  Passed 3oz water test: Yes          Interval History:  N/T of the left side of the body for about 2 weeks.   Over the past 3 days the numbness has improved to more of a faint numbness, more on the edge of the foot and pinky. Continues to feel tingles to touch throughout left side of the body.     Moderate fatigue    Heat intolerance severe     Fell twice, having balance difficulties intermittently     When glasses are off she will see twinkles in her vision - this has resolved only happened once for approximately 20 minutes.    Dis-coordination at times when using her left hand, feels as though her fine motor skills have decreased in the left hand.     Denies any pain, headaches, or weakness.    ED Notes from 9/16/20 reviewed and summarized:  Sx started Saturday 9/12 with tingling sensation in the lateral left arm and left leg and   extending to the left side of the face. MRI consistent with MS, she was given 1G IV solu-medrol and discharged to follow up with Neurology.     MRI Brain 9/15/20, my review of films, moderate T2 lesion load with enhancing lesion in the right parietal centrum semiovale.     MRI Cervical spine 9/15/20, my review of films, minimal disc bulge at C5-6, and C6-7. No evidence of MS lesions.     Labs 9/14/20 - CBC, CMP, Mag, UA - NCS     Past Medical History:   Diagnosis Date   • Fracture, radius and ulna, proximal     right     History reviewed. No pertinent family history.  Social History     Socioeconomic History   • Marital status:      Spouse name: Not on file   • Number of children: Not on file   • Years of education: Not on file   • Highest education level: Not on file   Tobacco Use   • Smoking status: Current Every Day Smoker     Packs/day: 1.00     Years: 10.00     Pack years: 10.00     Types: Cigarettes   • Smokeless tobacco: Never Used   Substance and Sexual Activity   • Alcohol use: No   • Drug use: No   • Sexual activity: Defer       Review of Systems   Constitutional: Positive for fatigue. Negative for activity change and unexpected weight change.   HENT: Negative for tinnitus and trouble swallowing.    Eyes: Negative for photophobia and visual disturbance.   Respiratory: Negative for apnea, cough and choking.    Cardiovascular: Negative for leg swelling.   Gastrointestinal: Negative for nausea and vomiting.   Endocrine: Positive for heat intolerance. Negative for cold intolerance.   Genitourinary: Negative for difficulty urinating, frequency, menstrual problem and urgency.   Musculoskeletal: Negative for back pain, gait problem, myalgias and neck pain.   Skin: Negative for color change and rash.   Allergic/Immunologic: Negative for immunocompromised state.   Neurological: Positive for dizziness and numbness. Negative for tremors, seizures, syncope, facial asymmetry, speech difficulty, weakness,  "light-headedness and headaches.   Hematological: Negative for adenopathy. Does not bruise/bleed easily.   Psychiatric/Behavioral: Negative for behavioral problems, confusion, decreased concentration, hallucinations and sleep disturbance.       Objective     Vitals:    10/05/20 1425   BP: 116/74   Pulse: 76   Resp: 16   Weight: 70.3 kg (155 lb)   Height: 157.5 cm (62\")       Neurologic Exam     Mental Status   Oriented to person, place, and time.   Registration: recalls 3 of 3 objects. Recall at 5 minutes: recalls 3 of 3 objects. Follows 3 step commands.   Attention: normal. Concentration: normal.   Speech: speech is normal   Level of consciousness: alert  Knowledge: good and consistent with education.   Able to name object. Able to read. Able to repeat. Able to write. Normal comprehension.     Cranial Nerves     CN II   Visual fields full to confrontation.   Visual acuity: normal  Right visual field deficit: none  Left visual field deficit: none     CN III, IV, VI   Pupils are equal, round, and reactive to light.  Extraocular motions are normal.   Right pupil: Shape: regular. Reactivity: brisk. Consensual response: intact.   Left pupil: Shape: regular. Reactivity: brisk. Consensual response: intact.   Nystagmus: none   Diplopia: none  Ophthalmoparesis: none  Upgaze: normal  Downgaze: normal  Conjugate gaze: present  Vestibulo-ocular reflex: present    CN V   Facial sensation intact.   Right corneal reflex: normal  Left corneal reflex: normal    CN VII   Right facial weakness: none  Left facial weakness: none    CN VIII   Hearing: intact    CN IX, X   Palate: symmetric  Right gag reflex: normal  Left gag reflex: normal    CN XI   Right sternocleidomastoid strength: normal  Left sternocleidomastoid strength: normal    CN XII   Tongue: not atrophic  Fasciculations: absent  Tongue deviation: none    Motor Exam   Muscle bulk: normal  Overall muscle tone: normal  Right arm tone: normal  Left arm tone: normal  Right leg " tone: normal  Left leg tone: normal    Strength   Strength 5/5 throughout.     Sensory Exam   Light touch normal.   Proprioception normal.   Pinprick normal.   Lowest sensation to pinprick on left: T11 (tingling down to T11 )    Gait, Coordination, and Reflexes     Coordination   Romberg: positive  Finger to nose coordination: normal  Heel to shin coordination: normal  Tandem walking coordination: abnormal    Tremor   Resting tremor: absent  Intention tremor: absent  Action tremor: absent    Reflexes   Reflexes 2+ except as noted.       Physical Exam  Vitals signs and nursing note reviewed.   Constitutional:       Appearance: Normal appearance.   HENT:      Head: Normocephalic and atraumatic.   Eyes:      Extraocular Movements: Extraocular movements intact and EOM normal.      Pupils: Pupils are equal, round, and reactive to light.   Neck:      Musculoskeletal: Normal range of motion.   Musculoskeletal: Normal range of motion.   Skin:     General: Skin is warm and dry.      Capillary Refill: Capillary refill takes less than 2 seconds.   Neurological:      General: No focal deficit present.      Mental Status: She is alert and oriented to person, place, and time.      Coordination: Romberg Test abnormal. Finger-Nose-Finger Test and Heel to Shin Test normal.      Gait: Tandem walk abnormal.      Deep Tendon Reflexes: Strength normal.   Psychiatric:         Mood and Affect: Mood normal.         Speech: Speech normal.         Hospital Outpatient Visit on 09/28/2020   Component Date Value Ref Range Status   • ACE CSF 09/28/2020 <1.5  0.0 - 2.5 U/L Final   • AFB Culture 09/28/2020 No AFB isolated at 1 week   Preliminary   • AFB Stain 09/28/2020 No acid fast bacilli seen on direct smear   Preliminary   • Cryptococcal Antigen, CSF 09/28/2020 Negative  Negative Final   • Andres-Barr Virus Real Time 09/28/2020 Negative  Negative Final    No Andres-Barr Virus DNA Detected.  This test was developed and its performance  characteristics determined  by [facility]. It has not been cleared or approved by the  U.[place]. Food and Drug Administration. The FDA has determined that such  clearance or approval is not necessary. This test is used for clinical  purposes. It should not be regarded as investigational or research.   • Fungus Culture 09/28/2020 No fungus isolated at 1 week   Preliminary   • Glucose, CSF 09/28/2020 62  40 - 70 mg/dL Final   • HSV-1 DNA 09/28/2020 Negative  Negative Final   • HSV-2 DNA 09/28/2020 Negative  Negative Final    This test was developed and its performance characteristics determined  by [facility]. It has not been cleared or approved by the  U.S. Food and Drug Administration. The FDA has determined that such  clearance or approval is not necessary. This test is used for clinical  purposes. It should not be regarded as investigational or research.   • IgG, CSF 09/28/2020 10.1* 0.0 - 8.6 mg/dL Final   • Albumin, CSF 09/28/2020 19  11 - 48 mg/dL Final   • Albumin 09/28/2020 4.0  3.8 - 4.8 g/dL Final   • IgG 09/28/2020 910  586 - 1602 mg/dL Final   • IgG/Alb Ratio, CSF 09/28/2020 0.53* 0.00 - 0.25 Final   • IgG Index, CSF 09/28/2020 2.3* 0.0 - 0.7 Final   • IgG Synthesis Rate, CSF 09/28/2020 37.4* -9.9 TO +3.3 mg/day Final   • Oligoclonal Bands, CSF 09/28/2020 Comment   Final    Comment: Three (3) oligoclonal bands were observed in the CSF, which were not  detected in the serum sample.  In addition, greater than five (>5) paired bands were observed in  both the CSF and serum. The paired CSF and serum bands may indicate  co-existing systemic diseases outside of the CNS.  Interpretation:   Criteria for Positivity: Four (4) or more oligoclonal bands observed   only in the CSF have been shown to be most consistent with MS   using our method. [Daksha AS, Jeffy EL, Sahil BORJAS, and   Balbir JA: Cerebrospinal Fluid Oligoclonal Bands in the Diagnosis   of Multiple Sclerosis. Am J Clin Pathol  120(5):672-675, 2003].   Oligoclonal bands that are present only in the CSF have been   associated with a variety of inflammatory brain diseases such as   multiple sclerosis (MS), subacute encephalitis, neurosyphilis, etc.   Increased IgG in the CSF is not specific for MS, but is an indication   of chronic neural inflammation. Clinical correlation indicated.   Approximately 2-3% of                            clinically confirmed MS patients show little   or no evidence of oligoclonal bands in the CSF; however oligoclonal   bands may develop as the disease progresses.   Oligoclonal Banding testing performed using Isoelectric Focusing   (IEF) and immunoblotting methodology.   • CSF/Serum Albumin Index 09/28/2020 5  0 - 8 Final             Relationship to blood-brain barrier:            Consistent with an intact barrier        <9            Slight impairment                   9 -  14            Moderate impairment                14 -  30            Severe impairment                  30 - 100            Complete breakdown                     >100   • Case Report 09/28/2020    Final                    Value:Non-gynecologic Cytology                          Case: [id number]                                  Authorizing Provider:  Shakira Mantilla,     Collected:           09/28/2020 10:55 AM                                 APRN                                                                         Ordering Location:     [facility]   Received:            09/28/2020 12:32 PM                                 XRAY                                                                         Pathologist:           Cristiano Willson MD                                                          Specimen:    CSF [place]                                                                             • Final Diagnosis 09/28/2020    Final                    Value:This result contains rich text formatting which cannot be  displayed here.   • Protein, Total (CSF) 09/28/2020 37.7  15.0 - 45.0 mg/dL Final   • WBC, CSF 09/28/2020 9* 0 - 5 /mm3 Final   • RBC, CSF 09/28/2020 2  0 - 5 /mm3 Final   • Color, CSF 09/28/2020 Colorless  Colorless Final   • Supernatant Color, CSF 09/28/2020 Colorless  Colorless Final   • Appearance, CSF 09/28/2020 Clear  Clear Final   • Volume, CSF 09/28/2020 10.0  mL Final   • Tube Number, CSF 09/28/2020 1   Final   • Lymphocytes, CSF 09/28/2020 90  62 - 100 % Final   • Monocytes, CSF 09/28/2020 10  0 - 36 % Final         Assessment/Plan     Problem List Items Addressed This Visit        Nervous and Auditory    Multiple sclerosis (CMS/HCC)    Current Assessment & Plan     Start Tecfidera, given samples     Given instructions on taking with a high fat meal and with an Aspirin 81 mg daily 30 minutes prior to taking Tecfidera.     Given information on MS, completed clinic for baseline metrics     Follow up in 4 weeks                   Return in about 4 weeks (around 11/2/2020).

## 2020-10-07 LAB
B BURGDOR IGG PATRN CSF IB-IMP: NEGATIVE
B BURGDOR IGM PATRN CSF IB-IMP: NEGATIVE
B BURGDOR18KD IGG CSF QL IB: NORMAL
B BURGDOR23KD IGG CSF QL IB: NORMAL
B BURGDOR23KD IGM CSF QL IB: NORMAL
B BURGDOR28KD IGG CSF QL IB: NORMAL
B BURGDOR30KD IGG CSF QL IB: NORMAL
B BURGDOR39KD IGG CSF QL IB: NORMAL
B BURGDOR39KD IGM CSF QL IB: NORMAL
B BURGDOR41KD IGG CSF QL IB: NORMAL
B BURGDOR41KD IGM CSF QL IB: NORMAL
B BURGDOR45KD IGG CSF QL IB: NORMAL
B BURGDOR58KD IGG CSF QL IB: NORMAL
B BURGDOR66KD IGG CSF QL IB: NORMAL
B BURGDOR93KD IGG CSF QL IB: NORMAL

## 2020-10-15 LAB
AQP4 H2O CHANNEL IGG SERPL QL: NEGATIVE
MOG FACS, S: NEGATIVE

## 2020-10-16 ENCOUNTER — SPECIALTY PHARMACY (OUTPATIENT)
Dept: ONCOLOGY | Facility: HOSPITAL | Age: 30
End: 2020-10-16

## 2020-10-16 RX ORDER — DIMETHYL FUMARATE 240 MG/1
240 CAPSULE ORAL 2 TIMES DAILY
Qty: 60 CAPSULE | Refills: 11 | Status: SHIPPED | OUTPATIENT
Start: 2020-10-16 | End: 2022-07-28

## 2020-10-20 NOTE — PROGRESS NOTES
Mailed written educational material for Tecfidera following phone call attempts. Provided with my contact information and instructed to call if any questions/concerns should arise.

## 2020-11-09 LAB
FUNGUS WND CULT: NORMAL
MYCOBACTERIUM SPEC CULT: NORMAL
NIGHT BLUE STAIN TISS: NORMAL

## 2020-12-14 ENCOUNTER — TELEPHONE (OUTPATIENT)
Dept: NEUROLOGY | Facility: CLINIC | Age: 30
End: 2020-12-14

## 2020-12-14 NOTE — TELEPHONE ENCOUNTER
Made an appointment to discuss changing medication. Having issues getting Tecfidera mailed to house.

## 2020-12-14 NOTE — TELEPHONE ENCOUNTER
Caller: CHERI DANIELATIYA    Relationship: SELF    Best call back number:[phone]    What medications are you currently taking:   Current Outpatient Medications on File Prior to Visit   Medication Sig Dispense Refill   • Dimethyl Fumarate (Tecfidera) capsule delayed-release Take 1 capsule by mouth 2 (Two) Times a Day. 60 capsule 11     No current facility-administered medications on file prior to visit.           Which medication are you concerned about: Dimethyl Fumarate (Tecfidera) capsule delayed-release             What are your concerns: PT STATES SHE IS  SUPPOSED TO GET MEDICATION IN THE MAIL BUT HAS NEVER RECIEVED   MEDICATION .  SHE IS WONDERING IF  RX CAN BE SENT INTO [facility] INSTEAD OF BY MAIL SHE STATES SHE HAS NONE AND HAS NOT HAD ANY FOR OVER THREE WEEKS  PLEASE ADVISE  THANK YOU

## 2020-12-15 ENCOUNTER — OFFICE VISIT (OUTPATIENT)
Dept: NEUROLOGY | Facility: CLINIC | Age: 30
End: 2020-12-15

## 2020-12-15 ENCOUNTER — TELEPHONE (OUTPATIENT)
Dept: NEUROLOGY | Facility: CLINIC | Age: 30
End: 2020-12-15

## 2020-12-15 DIAGNOSIS — R20.2 NUMBNESS AND TINGLING: ICD-10-CM

## 2020-12-15 DIAGNOSIS — R20.0 NUMBNESS AND TINGLING: ICD-10-CM

## 2020-12-15 DIAGNOSIS — G35 MULTIPLE SCLEROSIS (HCC): Primary | ICD-10-CM

## 2020-12-15 PROCEDURE — 99213 OFFICE O/P EST LOW 20 MIN: CPT | Performed by: NURSE PRACTITIONER

## 2020-12-15 RX ORDER — PREDNISONE 10 MG/1
TABLET ORAL
Qty: 18 TABLET | Refills: 0 | Status: SHIPPED | OUTPATIENT
Start: 2020-12-15 | End: 2021-01-14

## 2020-12-15 NOTE — ASSESSMENT & PLAN NOTE
Returned for a few days only in upper extremity     Called in prednisone taper until restarted on Tecfidera

## 2020-12-15 NOTE — TELEPHONE ENCOUNTER
Calling to verify if [facility] has been able to get a hold of patient about medication being mailed. Representative states she is finding documentation saying order was cancelled. Spoke with Diane a patient care representative to clarify why it was stopped and she stated it was because they attempted to reach out to patient to get delivery set up and they were not able to get a hold of patient. Diane with [employer] gave me a phone number to provide the patient so that she can get it set up to be delivered, [phone].

## 2020-12-15 NOTE — ASSESSMENT & PLAN NOTE
Discussed the multiple phone call attempts with the patient, she states she does not have missed calls. I have advised her to speak to her phone carrier and see if she has blocks on her phone. She will be unable to receive her medication shipments until she has accepted it with [facility]. The phone number [phone] was given to the patient to request the shipment.     Sending a one month supply of Tecfidera samples through the mail to her house along with administration instructions as she will need to restart the titration.     Patient V/U and will contact [facility], patient instructed to notify me in 2 weeks if she has not received medication.     Called in Prednisone 10 mg taper for symptom management     F/U in 4 weeks or sooner if needed.

## 2020-12-15 NOTE — PROGRESS NOTES
Subjective   Patient ID: Avani Rm is a 30 y.o. female     Chief Complaint   Patient presents with   • Multiple Sclerosis     Follow up      This visit was completed via telephone visit, the patient consented to receiving care via telephone visit prior to appointment.     History of Present Illness  30 y.o. female returns in follow up for RRMS. At last appointment on 10/5/20 started Tecfidera and given samples.     [facility] could not find her address to get her Tecfidera sent to her house but they told her they would find it and send it. She never got it. Patient is requesting a different medication that is easier to get. MS Coordinator [facility] and they could not get ahold of the patient to accept the shipment and it was canceled. [facility] pharmacist has also reached out to the patient with no response.     Patient took a 2 week supply of the titration, mild flushing that does not interfer  denies side effects.     Having sx of left arm numbness and weakness dropped a plate. Lasted a couple of days and then went back to normal. Denies further new or worsening symptoms.     LP with 3 OCB, IgG 10.1, IgG synthesis 37.4, protein 37.7    MOG and NMO - negative     Labs 9/21/20: Vitamin D 27.9, B12/Folate, CBC, CMP, Sjogrens, RF, Sed rate, ACE - NCS   BASSAM - positive     Patient states all fatigue, numbness, and heat intolerance have gone away, she feels back to normal.    Interval History:  N/T of the left side of the body for about 2 weeks.   Over the past 3 days the numbness has improved to more of a faint numbness, more on the edge of the foot and pinky. Continues to feel tingles to touch throughout left side of the body.     Moderate fatigue    Heat intolerance severe     Fell twice, having balance difficulties intermittently     When glasses are off she will see twinkles in her vision - this has resolved only happened once for approximately 20 minutes.    Dis-coordination at times when  using her left hand, feels as though her fine motor skills have decreased in the left hand.     Denies any pain, headaches, or weakness.    ED Notes from 9/16/20 reviewed and summarized:  Sx started Saturday 9/12 with tingling sensation in the lateral left arm and left leg and  extending to the left side of the face. MRI consistent with MS, she was given 1G IV solu-medrol and discharged to follow up with Neurology.     MRI Brain 9/15/20, my review of films, moderate T2 lesion load with enhancing lesion in the right parietal centrum semiovale.     MRI Cervical spine 9/15/20, my review of films, minimal disc bulge at C5-6, and C6-7. No evidence of MS lesions.     Labs 9/14/20 - CBC, CMP, Mag, UA - NCS     Past Medical History:   Diagnosis Date   • Fracture, radius and ulna, proximal     right     Family History   Problem Relation Age of Onset   • No Known Problems Mother    • No Known Problems Father    • No Known Problems Sister    • No Known Problems Brother    • No Known Problems Maternal Aunt    • No Known Problems Maternal Uncle    • No Known Problems Paternal Aunt    • No Known Problems Paternal Uncle    • No Known Problems Maternal Grandmother    • No Known Problems Maternal Grandfather    • No Known Problems Paternal Grandmother    • No Known Problems Paternal Grandfather    • No Known Problems Other    • Ataxia Neg Hx    • Chorea Neg Hx    • Dementia Neg Hx    • Mental retardation Neg Hx    • Migraines Neg Hx    • Multiple sclerosis Neg Hx    • Neurofibromatosis Neg Hx    • Neuropathy Neg Hx    • Parkinsonism Neg Hx    • Seizures Neg Hx    • Stroke Neg Hx      Social History     Socioeconomic History   • Marital status:      Spouse name: Not on file   • Number of children: Not on file   • Years of education: Not on file   • Highest education level: Not on file   Tobacco Use   • Smoking status: Current Every Day Smoker     Packs/day: 1.00     Years: 10.00     Pack years: 10.00     Types: Cigarettes   •  Smokeless tobacco: Never Used   Substance and Sexual Activity   • Alcohol use: No   • Drug use: No   • Sexual activity: Defer       Review of Systems   Constitutional: Positive for fatigue. Negative for activity change and unexpected weight change.   HENT: Negative for tinnitus and trouble swallowing.    Eyes: Negative for photophobia and visual disturbance.   Respiratory: Negative for apnea, cough and choking.    Cardiovascular: Negative for leg swelling.   Gastrointestinal: Negative for nausea and vomiting.   Endocrine: Positive for heat intolerance. Negative for cold intolerance.   Genitourinary: Negative for difficulty urinating, frequency, menstrual problem and urgency.   Musculoskeletal: Negative for back pain, gait problem, myalgias and neck pain.   Skin: Negative for color change and rash.   Allergic/Immunologic: Negative for immunocompromised state.   Neurological: Positive for dizziness and numbness. Negative for tremors, seizures, syncope, facial asymmetry, speech difficulty, weakness, light-headedness and headaches.   Hematological: Negative for adenopathy. Does not bruise/bleed easily.   Psychiatric/Behavioral: Negative for behavioral problems, confusion, decreased concentration, hallucinations and sleep disturbance.       Objective     There were no vitals filed for this visit.    Neurologic Exam    Physical Exam    Hospital Outpatient Visit on 09/28/2020   Component Date Value Ref Range Status   • ACE CSF 09/28/2020 <1.5  0.0 - 2.5 U/L Final   • AFB Culture 09/28/2020 No AFB isolated at 6 weeks   Final   • AFB Stain 09/28/2020 No acid fast bacilli seen on direct smear   Final   • Cryptococcal Antigen, CSF 09/28/2020 Negative  Negative Final   • Andres-Barr Virus Real Time 09/28/2020 Negative  Negative Final    No Andres-Barr Virus DNA Detected.  This test was developed and its performance characteristics determined  by [facility]. It has not been cleared or approved by the  U.S. Food and  Drug Administration. The FDA has determined that such  clearance or approval is not necessary. This test is used for clinical  purposes. It should not be regarded as investigational or research.   • Fungus Culture 09/28/2020 No fungus isolated at 6 weeks   Final   • Glucose, CSF 09/28/2020 62  40 - 70 mg/dL Final   • HSV-1 DNA 09/28/2020 Negative  Negative Final   • HSV-2 DNA 09/28/2020 Negative  Negative Final    This test was developed and its performance characteristics determined  by [facility]. It has not been cleared or approved by the  U.S. Food and Drug Administration. The FDA has determined that such  clearance or approval is not necessary. This test is used for clinical  purposes. It should not be regarded as investigational or research.   • P93 Ab. IgG CSF 09/28/2020 Absent   Final   • P66 Ab. IgG CSF 09/28/2020 Absent   Final   • P58 Ab. IgG CSF 09/28/2020 Absent   Final   • P45 Ab. IgG CSF 09/28/2020 Absent   Final   • P41 Ab. IgG CSF 09/28/2020 Absent   Final   • P39 Ab. IgG  CSF 09/28/2020 Absent   Final   • P30 Ab. IgG CSF 09/28/2020 Absent   Final   • P28 Ab. IgG CSF 09/28/2020 Absent   Final   • P23 Ab. IgG CSF 09/28/2020 Absent   Final   • P18 Ab. IgG 09/28/2020 Absent   Final   • Lyme IgG Western Blot Interpretati* 09/28/2020 Negative   Final    Positive: 5 of the following bands: Borrelia-specific            bands: P18,23,28,30,39,41,45,58,66, and 93.  Negative: No bands or banding patterns which do not meet            positive criteria.  Note: For investigational use only.   • P41 Ab. IgM CSF 09/28/2020 Absent   Final   • P39 Ab. IgM CSF 09/28/2020 Absent   Final   • P23 Ab. IgM CSF 09/28/2020 Absent   Final   • Lyme IgM Western Blot Interpretati* 09/28/2020 Negative   Final    Note: An equivocal or positive EIA result followed by a negative  Line Blot result is considered NEGATIVE. An equivocal or positive  EIA result followed by a positive Line Blot is considered POSITIVE  by the  CDC.  Positive: 2 of the following bands: 23,39 or 41  Negative: No bands or banding patterns which do not meet positive  criteria.  Criteria for positivity are those recommended by CDC/ASTPHLD.  p23=Osp C, v05=vsbvmsrkp  Criteria for positivity are those recommended by CDC/ASTPHLD.  p23=Osp C, k53=agtfhjohu   • IgG, CSF 09/28/2020 10.1* 0.0 - 8.6 mg/dL Final   • Albumin, CSF 09/28/2020 19  11 - 48 mg/dL Final   • Albumin 09/28/2020 4.0  3.8 - 4.8 g/dL Final   • IgG 09/28/2020 910  586 - 1602 mg/dL Final   • IgG/Alb Ratio, CSF 09/28/2020 0.53* 0.00 - 0.25 Final   • IgG Index, CSF 09/28/2020 2.3* 0.0 - 0.7 Final   • IgG Synthesis Rate, CSF 09/28/2020 37.4* -9.9 TO +3.3 mg/day Final   • Oligoclonal Bands, CSF 09/28/2020 Comment   Final    Comment: Three (3) oligoclonal bands were observed in the CSF, which were not  detected in the serum sample.  In addition, greater than five (>5) paired bands were observed in  both the CSF and serum. The paired CSF and serum bands may indicate  co-existing systemic diseases outside of the CNS.  Interpretation:   Criteria for Positivity: Four (4) or more oligoclonal bands observed   only in the CSF have been shown to be most consistent with MS   using our method. [Daksha AS, Jeffy EL, Sahil BORJAS, and   Balbir JA: Cerebrospinal Fluid Oligoclonal Bands in the Diagnosis   of Multiple Sclerosis. Am J Clin Pathol 120(5):672-675, 2003].   Oligoclonal bands that are present only in the CSF have been   associated with a variety of inflammatory brain diseases such as   multiple sclerosis (MS), subacute encephalitis, neurosyphilis, etc.   Increased IgG in the CSF is not specific for MS, but is an indication   of chronic neural inflammation. Clinical correlation indicated.   Approximately 2-3% of                            clinically confirmed MS patients show little   or no evidence of oligoclonal bands in the CSF; however oligoclonal   bands may develop as the disease progresses.    Oligoclonal Banding testing performed using Isoelectric Focusing   (IEF) and immunoblotting methodology.   • CSF/Serum Albumin Index 09/28/2020 5  0 - 8 Final             Relationship to blood-brain barrier:            Consistent with an intact barrier        <9            Slight impairment                   9 -  14            Moderate impairment                14 -  30            Severe impairment                  30 - 100            Complete breakdown                     >100   • Case Report 09/28/2020    Final                    Value:Non-gynecologic Cytology                          Case: [id number]                                  Authorizing Provider:  Shakira Mantilla,     Collected:           09/28/2020 10:55 AM                                 APRN                                                                         Ordering Location:     [facility]   Received:            09/28/2020 12:32 PM                                 XRAY                                                                         Pathologist:           Cristiano Willson MD                                                          Specimen:    CSF Geronimo                                                                             • Final Diagnosis 09/28/2020    Final                    Value:This result contains rich text formatting which cannot be displayed here.   • Protein, Total (CSF) 09/28/2020 37.7  15.0 - 45.0 mg/dL Final   • WBC, CSF 09/28/2020 9* 0 - 5 /mm3 Final   • RBC, CSF 09/28/2020 2  0 - 5 /mm3 Final   • Color, CSF 09/28/2020 Colorless  Colorless Final   • Supernatant Color, CSF 09/28/2020 Colorless  Colorless Final   • Appearance, CSF 09/28/2020 Clear  Clear Final   • Volume, CSF 09/28/2020 10.0  mL Final   • Tube Number, CSF 09/28/2020 1   Final   • Lymphocytes, CSF 09/28/2020 90  62 - 100 % Final   • Monocytes, CSF 09/28/2020 10  0 - 36 % Final         Assessment/Plan     Problem List Items Addressed  This Visit        Nervous and Auditory    Numbness and tingling    Current Assessment & Plan     Returned for a few days only in upper extremity     Called in prednisone taper until restarted on Tecfidera          Multiple sclerosis (CMS/[facility]) - Primary    Current Assessment & Plan     Discussed the multiple phone call attempts with the patient, she states she does not have missed calls. I have advised her to speak to her phone carrier and see if she has blocks on her phone. She will be unable to receive her medication shipments until she has accepted it with [facility]. The phone number [phone] was given to the patient to request the shipment.     Sending a one month supply of Tecfidera samples through the mail to her house along with administration instructions as she will need to restart the titration.     Patient V/U and will contact [facility], patient instructed to notify me in 2 weeks if she has not received medication.     Called in Prednisone 10 mg taper for symptom management     F/U in 4 weeks or sooner if needed.          Relevant Medications    predniSONE (DELTASONE) 10 MG tablet             Return in about 4 weeks (around 1/12/2021).       I spent a total of 15 minutes via telephone visit providing patient care.

## 2020-12-15 NOTE — TELEPHONE ENCOUNTER
Mailed Tecfidera samples 12/15/2020. Once patient gets things set up with [facility] she will be getting medication mailed from them.

## 2021-01-14 ENCOUNTER — OFFICE VISIT (OUTPATIENT)
Dept: NEUROLOGY | Facility: CLINIC | Age: 31
End: 2021-01-14

## 2021-01-14 DIAGNOSIS — G35 MULTIPLE SCLEROSIS (HCC): Primary | Chronic | ICD-10-CM

## 2021-01-14 PROCEDURE — 99442 PR PHYS/QHP TELEPHONE EVALUATION 11-20 MIN: CPT | Performed by: NURSE PRACTITIONER

## 2021-01-14 NOTE — ASSESSMENT & PLAN NOTE
Tolerating Tecfidera without difficulty     Labs and MRI's UTD     No new or worsening symptoms     F/U in 6 months or sooner if needed

## 2021-01-14 NOTE — PROGRESS NOTES
Subjective   Patient ID: Avani Rm is a 30 y.o. female     Chief Complaint   Patient presents with   • Multiple Sclerosis     This visit was completed via telephone visit, the patient consented to receiving care via telephone visit prior to appointment.     History of Present Illness  30 y.o. female returns in follow up for RRMS. At last appointment on 12/15/20 patient had not received her tecfidera from her supplier, sent a one month supply and advised to call [facility].  Called in Prednisone Taper     Got the Tecfidera in the mail. Is taking the 81 mg aspirin without any flushing. Denies GI side effects. Tolerated the titration without difficulty.     Completely asymptomatic, denies any new or worsening symptoms.     LP with 3 OCB, IgG 10.1, IgG synthesis 37.4, protein 37.7    MOG and NMO - negative     Labs 9/21/20: Vitamin D 27.9, B12/Folate, CBC, CMP, Sjogrens, RF, Sed rate, ACE - NCS   BASSAM - positive     Patient states all fatigue, numbness, and heat intolerance have gone away, she feels back to normal.    Problem History:  N/T of the left side of the body for about 2 weeks.   Over the past 3 days the numbness has improved to more of a faint numbness, more on the edge of the foot and pinky. Continues to feel tingles to touch throughout left side of the body.     Moderate fatigue    Heat intolerance severe     Fell twice, having balance difficulties intermittently     When glasses are off she will see twinkles in her vision - this has resolved only happened once for approximately 20 minutes.    Dis-coordination at times when using her left hand, feels as though her fine motor skills have decreased in the left hand.     Denies any pain, headaches, or weakness.    ED Notes from 9/16/20 reviewed and summarized:  Sx started Saturday 9/12 with tingling sensation in the lateral left arm and left leg and  extending to the left side of the face. MRI consistent with MS, she was given 1G IV solu-medrol and  discharged to follow up with Neurology.     MRI Brain 9/15/20, my review of films, moderate T2 lesion load with enhancing lesion in the right parietal centrum semiovale.     MRI Cervical spine 9/15/20, my review of films, minimal disc bulge at C5-6, and C6-7. No evidence of MS lesions.     Labs 9/14/20 - CBC, CMP, Mag, UA - NCS     Past Medical History:   Diagnosis Date   • Fracture, radius and ulna, proximal     right     Family History   Problem Relation Age of Onset   • No Known Problems Mother    • No Known Problems Father    • No Known Problems Sister    • No Known Problems Brother    • No Known Problems Maternal Aunt    • No Known Problems Maternal Uncle    • No Known Problems Paternal Aunt    • No Known Problems Paternal Uncle    • No Known Problems Maternal Grandmother    • No Known Problems Maternal Grandfather    • No Known Problems Paternal Grandmother    • No Known Problems Paternal Grandfather    • No Known Problems Other    • Ataxia Neg Hx    • Chorea Neg Hx    • Dementia Neg Hx    • Mental retardation Neg Hx    • Migraines Neg Hx    • Multiple sclerosis Neg Hx    • Neurofibromatosis Neg Hx    • Neuropathy Neg Hx    • Parkinsonism Neg Hx    • Seizures Neg Hx    • Stroke Neg Hx      Social History     Socioeconomic History   • Marital status:      Spouse name: Not on file   • Number of children: Not on file   • Years of education: Not on file   • Highest education level: Not on file   Tobacco Use   • Smoking status: Current Every Day Smoker     Packs/day: 1.00     Years: 10.00     Pack years: 10.00     Types: Cigarettes   • Smokeless tobacco: Never Used   Substance and Sexual Activity   • Alcohol use: No   • Drug use: No   • Sexual activity: Defer       Review of Systems   Constitutional: Positive for fatigue. Negative for activity change and unexpected weight change.   HENT: Negative for tinnitus and trouble swallowing.    Eyes: Negative for photophobia and visual disturbance.   Respiratory:  Negative for apnea, cough and choking.    Cardiovascular: Negative for leg swelling.   Gastrointestinal: Negative for nausea and vomiting.   Endocrine: Positive for heat intolerance. Negative for cold intolerance.   Genitourinary: Negative for difficulty urinating, frequency, menstrual problem and urgency.   Musculoskeletal: Negative for back pain, gait problem, myalgias and neck pain.   Skin: Negative for color change and rash.   Allergic/Immunologic: Negative for immunocompromised state.   Neurological: Positive for dizziness and numbness. Negative for tremors, seizures, syncope, facial asymmetry, speech difficulty, weakness, light-headedness and headaches.   Hematological: Negative for adenopathy. Does not bruise/bleed easily.   Psychiatric/Behavioral: Negative for behavioral problems, confusion, decreased concentration, hallucinations and sleep disturbance.       Objective     There were no vitals filed for this visit.    Neurologic Exam    Physical Exam    Hospital Outpatient Visit on 09/28/2020   Component Date Value Ref Range Status   • ACE CSF 09/28/2020 <1.5  0.0 - 2.5 U/L Final   • AFB Culture 09/28/2020 No AFB isolated at 6 weeks   Final   • AFB Stain 09/28/2020 No acid fast bacilli seen on direct smear   Final   • Cryptococcal Antigen, CSF 09/28/2020 Negative  Negative Final   • Andres-Barr Virus Real Time 09/28/2020 Negative  Negative Final    No Andres-Barr Virus DNA Detected.  This test was developed and its performance characteristics determined  by [facility]. It has not been cleared or approved by the  U.S. Food and Drug Administration. The FDA has determined that such  clearance or approval is not necessary. This test is used for clinical  purposes. It should not be regarded as investigational or research.   • Fungus Culture 09/28/2020 No fungus isolated at 6 weeks   Final   • Glucose, CSF 09/28/2020 62  40 - 70 mg/dL Final   • HSV-1 DNA 09/28/2020 Negative  Negative Final   • HSV-2  DNA 09/28/2020 Negative  Negative Final    This test was developed and its performance characteristics determined  by [facility]. It has not been cleared or approved by the  U.S. Food and Drug Administration. The FDA has determined that such  clearance or approval is not necessary. This test is used for clinical  purposes. It should not be regarded as investigational or research.   • P93 Ab. IgG CSF 09/28/2020 Absent   Final   • P66 Ab. IgG CSF 09/28/2020 Absent   Final   • P58 Ab. IgG CSF 09/28/2020 Absent   Final   • P45 Ab. IgG CSF 09/28/2020 Absent   Final   • P41 Ab. IgG CSF 09/28/2020 Absent   Final   • P39 Ab. IgG  CSF 09/28/2020 Absent   Final   • P30 Ab. IgG CSF 09/28/2020 Absent   Final   • P28 Ab. IgG CSF 09/28/2020 Absent   Final   • P23 Ab. IgG CSF 09/28/2020 Absent   Final   • P18 Ab. IgG 09/28/2020 Absent   Final   • Lyme IgG Western Blot Interpretati* 09/28/2020 Negative   Final    Positive: 5 of the following bands: Borrelia-specific            bands: P18,23,28,30,39,41,45,58,66, and 93.  Negative: No bands or banding patterns which do not meet            positive criteria.  Note: For investigational use only.   • P41 Ab. IgM CSF 09/28/2020 Absent   Final   • P39 Ab. IgM CSF 09/28/2020 Absent   Final   • P23 Ab. IgM CSF 09/28/2020 Absent   Final   • Lyme IgM Western Blot Interpretati* 09/28/2020 Negative   Final    Note: An equivocal or positive EIA result followed by a negative  Line Blot result is considered NEGATIVE. An equivocal or positive  EIA result followed by a positive Line Blot is considered POSITIVE  by the CDC.  Positive: 2 of the following bands: 23,39 or 41  Negative: No bands or banding patterns which do not meet positive  criteria.  Criteria for positivity are those recommended by CDC/ASTPHLD.  p23=Osp C, t21=kczjbuing  Criteria for positivity are those recommended by CDC/ASTPHLD.  p23=Osp C, o21=hqprxwqxd   • IgG, CSF 09/28/2020 10.1* 0.0 - 8.6 mg/dL Final   • Albumin,  CSF 09/28/2020 19  11 - 48 mg/dL Final   • Albumin 09/28/2020 4.0  3.8 - 4.8 g/dL Final   • IgG 09/28/2020 910  586 - 1602 mg/dL Final   • IgG/Alb Ratio, CSF 09/28/2020 0.53* 0.00 - 0.25 Final   • IgG Index, CSF 09/28/2020 2.3* 0.0 - 0.7 Final   • IgG Synthesis Rate, CSF 09/28/2020 37.4* -9.9 TO +3.3 mg/day Final   • Oligoclonal Bands, CSF 09/28/2020 Comment   Final    Comment: Three (3) oligoclonal bands were observed in the CSF, which were not  detected in the serum sample.  In addition, greater than five (>5) paired bands were observed in  both the CSF and serum. The paired CSF and serum bands may indicate  co-existing systemic diseases outside of the CNS.  Interpretation:   Criteria for Positivity: Four (4) or more oligoclonal bands observed   only in the CSF have been shown to be most consistent with MS   using our method. [Daksha AS, Jeffy EL, Sahil BORJAS, and   Balbir JA: Cerebrospinal Fluid Oligoclonal Bands in the Diagnosis   of Multiple Sclerosis. Am J Clin Pathol 120(5):672-675, 2003].   Oligoclonal bands that are present only in the CSF have been   associated with a variety of inflammatory brain diseases such as   multiple sclerosis (MS), subacute encephalitis, neurosyphilis, etc.   Increased IgG in the CSF is not specific for MS, but is an indication   of chronic neural inflammation. Clinical correlation indicated.   Approximately 2-3% of                            clinically confirmed MS patients show little   or no evidence of oligoclonal bands in the CSF; however oligoclonal   bands may develop as the disease progresses.   Oligoclonal Banding testing performed using Isoelectric Focusing   (IEF) and immunoblotting methodology.   • CSF/Serum Albumin Index 09/28/2020 5  0 - 8 Final             Relationship to blood-brain barrier:            Consistent with an intact barrier        <9            Slight impairment                   9 -  14            Moderate impairment                14 -  30             Severe impairment                  30 - 100            Complete breakdown                     >100   • Case Report 09/28/2020    Final                    Value:Non-gynecologic Cytology                          Case: [id number]                                  Authorizing Provider:  Shakira Mantilla,     Collected:           09/28/2020 10:55 AM                                 APRN                                                                         Ordering Location:     [facility]   Received:            09/28/2020 12:32 PM                                 XRAY                                                                         Pathologist:           Cristiano Willson MD                                                          Specimen:    CSF [place]                                                                             • Final Diagnosis 09/28/2020    Final                    Value:This result contains rich text formatting which cannot be displayed here.   • Protein, Total (CSF) 09/28/2020 37.7  15.0 - 45.0 mg/dL Final   • WBC, CSF 09/28/2020 9* 0 - 5 /mm3 Final   • RBC, CSF 09/28/2020 2  0 - 5 /mm3 Final   • Color, CSF 09/28/2020 Colorless  Colorless Final   • Supernatant Color, CSF 09/28/2020 Colorless  Colorless Final   • Appearance, CSF 09/28/2020 Clear  Clear Final   • Volume, CSF 09/28/2020 10.0  mL Final   • Tube Number, CSF 09/28/2020 1   Final   • Lymphocytes, CSF 09/28/2020 90  62 - 100 % Final   • Monocytes, CSF 09/28/2020 10  0 - 36 % Final         Assessment/Plan     Problem List Items Addressed This Visit        Other    Multiple sclerosis (CMS/[facility]) - Primary    Current Assessment & Plan     Tolerating Tecfidera without difficulty     Labs and MRI's UTD     No new or worsening symptoms     F/U in 6 months or sooner if needed                   Return in about 6 months (around 7/14/2021).       I spent a total of 15 minutes via telephone visit providing patient care.

## 2021-02-04 ENCOUNTER — HOSPITAL ENCOUNTER (EMERGENCY)
Facility: HOSPITAL | Age: 31
Discharge: HOME | End: 2021-02-04
Attending: EMERGENCY MEDICINE | Admitting: EMERGENCY MEDICINE

## 2021-02-04 VITALS
HEART RATE: 76 BPM | SYSTOLIC BLOOD PRESSURE: 117 MMHG | BODY MASS INDEX: 26.43 KG/M2 | TEMPERATURE: 98.6 F | DIASTOLIC BLOOD PRESSURE: 49 MMHG | HEIGHT: 61 IN | RESPIRATION RATE: 16 BRPM | OXYGEN SATURATION: 99 % | WEIGHT: 140 LBS

## 2021-02-04 DIAGNOSIS — K04.7 TOOTH ABSCESS: Primary | ICD-10-CM

## 2021-02-04 PROCEDURE — 99283 EMERGENCY DEPT VISIT LOW MDM: CPT

## 2021-02-04 RX ORDER — CLINDAMYCIN HYDROCHLORIDE 300 MG/1
300 CAPSULE ORAL EVERY 6 HOURS
Qty: 28 CAPSULE | Refills: 0 | Status: SHIPPED | OUTPATIENT
Start: 2021-02-04 | End: 2021-02-11

## 2021-02-04 RX ORDER — HYDROCODONE BITARTRATE AND ACETAMINOPHEN 5; 325 MG/1; MG/1
1 TABLET ORAL EVERY 6 HOURS PRN
Qty: 10 TABLET | Refills: 0 | Status: SHIPPED | OUTPATIENT
Start: 2021-02-04 | End: 2021-08-10

## 2021-02-04 RX ORDER — CLINDAMYCIN HYDROCHLORIDE 150 MG/1
600 CAPSULE ORAL ONCE
Status: COMPLETED | OUTPATIENT
Start: 2021-02-04 | End: 2021-02-04

## 2021-02-04 RX ORDER — OXYCODONE AND ACETAMINOPHEN 10; 325 MG/1; MG/1
1 TABLET ORAL ONCE
Status: COMPLETED | OUTPATIENT
Start: 2021-02-04 | End: 2021-02-04

## 2021-02-04 RX ADMIN — CLINDAMYCIN HYDROCHLORIDE 600 MG: 150 CAPSULE ORAL at 20:43

## 2021-02-04 RX ADMIN — OXYCODONE HYDROCHLORIDE AND ACETAMINOPHEN 1 TABLET: 10; 325 TABLET ORAL at 20:43

## 2021-02-04 RX ADMIN — BENZOCAINE: 200 LIQUID DENTAL; ORAL; PERIODONTAL at 20:43

## 2021-02-05 NOTE — ED PROVIDER NOTES
Subjective     History provided by:  Patient   used: No    Dental Pain  Location:  Lower  Lower teeth location:  18/LL 2nd molar  Quality:  Aching and constant  Severity:  Mild  Onset quality:  Sudden  Timing:  Constant  Progression:  Worsening  Chronicity:  New  Context: abscess, dental caries and poor dentition    Context: cap still on, not crown fracture, not dental fracture, not enamel fracture, filling intact, not intrusion, not malocclusion, not recent dental surgery and not trauma    Relieved by:  Nothing  Worsened by:  Nothing  Ineffective treatments:  None tried  Associated symptoms: facial swelling and gum swelling    Associated symptoms: no congestion, no difficulty swallowing, no drooling, no facial pain, no fever, no headaches, no neck pain, no neck swelling, no oral bleeding, no oral lesions and no trismus    Risk factors: no alcohol problem, no cancer, no chewing tobacco use, no diabetes, no immunosuppression, sufficient dental care, no periodontal disease and no smoking        Review of Systems   Constitutional: Negative for activity change, appetite change, chills, diaphoresis, fatigue and fever.   HENT: Positive for dental problem and facial swelling. Negative for congestion, drooling, ear pain, mouth sores and sore throat.    Eyes: Negative for redness.   Respiratory: Negative for cough, chest tightness, shortness of breath and wheezing.    Cardiovascular: Negative for chest pain, palpitations and leg swelling.   Gastrointestinal: Negative for abdominal pain, diarrhea, nausea and vomiting.   Genitourinary: Negative for dysuria and urgency.   Musculoskeletal: Negative for arthralgias, back pain, myalgias and neck pain.   Skin: Negative for pallor, rash and wound.   Neurological: Negative for dizziness, speech difficulty, weakness and headaches.   Psychiatric/Behavioral: Negative for agitation, behavioral problems, confusion and decreased concentration.   All other systems  reviewed and are negative.      Past Medical History:   Diagnosis Date   • Fracture, radius and ulna, proximal     right       Allergies   Allergen Reactions   • Codeine Unknown (See Comments)     Pt had reaction in childhood, unsure of reaction       Past Surgical History:   Procedure Laterality Date   •  SECTION      x 3   •  SECTION WITH TUBAL     • ORIF ULNA/RADIUS FRACTURES Right 3/27/2018    Procedure: ULNA/RADIUS OPEN REDUCTION INTERNAL FIXATION;  Surgeon: Benja Keene MD;  Location: [facility];  Service: Orthopedics   • SALPINGECTOMY     • TONSILLECTOMY     • WISDOM TOOTH EXTRACTION         Family History   Problem Relation Age of Onset   • No Known Problems Mother    • No Known Problems Father    • No Known Problems Sister    • No Known Problems Brother    • No Known Problems Maternal Aunt    • No Known Problems Maternal Uncle    • No Known Problems Paternal Aunt    • No Known Problems Paternal Uncle    • No Known Problems Maternal Grandmother    • No Known Problems Maternal Grandfather    • No Known Problems Paternal Grandmother    • No Known Problems Paternal Grandfather    • No Known Problems Other    • Ataxia Neg Hx    • Chorea Neg Hx    • Dementia Neg Hx    • Mental retardation Neg Hx    • Migraines Neg Hx    • Multiple sclerosis Neg Hx    • Neurofibromatosis Neg Hx    • Neuropathy Neg Hx    • Parkinsonism Neg Hx    • Seizures Neg Hx    • Stroke Neg Hx        Social History     Socioeconomic History   • Marital status:      Spouse name: Not on file   • Number of children: Not on file   • Years of education: Not on file   • Highest education level: Not on file   Tobacco Use   • Smoking status: Current Every Day Smoker     Packs/day: 1.00     Years: 10.00     Pack years: 10.00     Types: Cigarettes   • Smokeless tobacco: Never Used   Substance and Sexual Activity   • Alcohol use: No   • Drug use: No   • Sexual activity: Defer           Objective   Physical Exam  Vitals  signs and nursing note reviewed.   Constitutional:       General: She is not in acute distress.     Appearance: Normal appearance. She is well-developed. She is not toxic-appearing or diaphoretic.   HENT:      Head: Normocephalic and atraumatic.      Right Ear: External ear normal.      Left Ear: External ear normal.      Nose: Nose normal.      Mouth/Throat:      Pharynx: No oropharyngeal exudate.      Tonsils: No tonsillar exudate.     Eyes:      General: Lids are normal.      Conjunctiva/sclera: Conjunctivae normal.      Pupils: Pupils are equal, round, and reactive to light.   Neck:      Musculoskeletal: Full passive range of motion without pain, normal range of motion and neck supple.      Thyroid: No thyromegaly.   Cardiovascular:      Rate and Rhythm: Normal rate and regular rhythm.      Pulses: Normal pulses.      Heart sounds: Normal heart sounds, S1 normal and S2 normal.   Pulmonary:      Effort: Pulmonary effort is normal. No tachypnea or respiratory distress.      Breath sounds: Normal breath sounds. No decreased breath sounds, wheezing or rales.   Chest:      Chest wall: No tenderness.   Abdominal:      General: Bowel sounds are normal. There is no distension.      Palpations: Abdomen is soft.      Tenderness: There is no abdominal tenderness. There is no guarding or rebound.   Musculoskeletal: Normal range of motion.         General: No tenderness or deformity.   Lymphadenopathy:      Cervical: No cervical adenopathy.   Skin:     General: Skin is warm and dry.      Coloration: Skin is not pale.      Findings: No erythema or rash.   Neurological:      Mental Status: She is alert and oriented to person, place, and time.      GCS: GCS eye subscore is 4. GCS verbal subscore is 5. GCS motor subscore is 6.      Cranial Nerves: No cranial nerve deficit.      Sensory: No sensory deficit.   Psychiatric:         Speech: Speech normal.         Behavior: Behavior normal.         Thought Content: Thought content  normal.         Judgment: Judgment normal.         Procedures           ED Course                                           MDM  Number of Diagnoses or Management Options  Tooth abscess: new and requires workup     Amount and/or Complexity of Data Reviewed  Review and summarize past medical records: yes  Independent visualization of images, tracings, or specimens: yes    Risk of Complications, Morbidity, and/or Mortality  Presenting problems: moderate  Diagnostic procedures: moderate  Management options: moderate    Patient Progress  Patient progress: stable      Final diagnoses:   Tooth abscess            Tod Mcdermott MD  02/05/21 0021

## 2021-02-05 NOTE — DISCHARGE INSTRUCTIONS
Call one of the offices below to establish a primary care provider.  If you are unable to get an appointment and feel it is an emergency and need to be seen immediately please return to the Emergency Department.    Call one of the office below to set up a primary care provider.    Dr. Kenneth Nieves                                                                                                       [address] [facility] [address]  [phone]    Dr. Moon, Dr. KRISTIAN Herring, Dr. LEILANI Herring ([facility])  [address] [facility] [address]  [phone]    Dr. Sharpe, Dr. Sauceda, Dr. Petty ([facility])  [address] [facility] [address]  [phone]    Dr. Kitchen  [address] [facility] [address]  [phone]    Dr. Marie, Dr. Holman, Dr. Barnett, Dr. Cota ([facility])  [address] [place] [address] [address]  [facility] [address]  [phone]    Dr. Samira Linder  [address] [facility] [place] [address]  [phone]    Dr. Uma Hazel  [address]   [address]  [facility] [address]  [phone]    Dr. Nieves  [address] [facility] [address]  [phone]    Dr. Maldonado, Dr. Carvajal  [address] [facility] [place] [address]  [phone]    Dr. Sheets  [address] [address] [facility] [address]                                                              [address]  [facility] [address]  [phone]    Dr. Johnson  [address] [facility] [address]  [phone]    Dr. Kristyn Streeter  [address]  [address]  [place] [address]  [phone]    Dr. Matthews and [facility]   [address] [facility] [address]  [address]  [place] [address]  [phone]

## 2021-03-15 ENCOUNTER — BULK ORDERING (OUTPATIENT)
Dept: CASE MANAGEMENT | Facility: OTHER | Age: 31
End: 2021-03-15

## 2021-03-15 DIAGNOSIS — Z23 IMMUNIZATION DUE: ICD-10-CM

## 2021-04-12 ENCOUNTER — SPECIALTY PHARMACY (OUTPATIENT)
Dept: ONCOLOGY | Facility: HOSPITAL | Age: 31
End: 2021-04-12

## 2021-04-12 DIAGNOSIS — G35 MULTIPLE SCLEROSIS (HCC): Primary | ICD-10-CM

## 2021-08-04 ENCOUNTER — TELEPHONE (OUTPATIENT)
Dept: NEUROLOGY | Facility: CLINIC | Age: 31
End: 2021-08-04

## 2021-08-04 ENCOUNTER — LAB (OUTPATIENT)
Dept: LAB | Facility: HOSPITAL | Age: 31
End: 2021-08-04

## 2021-08-04 DIAGNOSIS — G35 MULTIPLE SCLEROSIS (HCC): ICD-10-CM

## 2021-08-04 DIAGNOSIS — G35 MULTIPLE SCLEROSIS (HCC): Primary | ICD-10-CM

## 2021-08-04 LAB
ALBUMIN SERPL-MCNC: 4.24 G/DL (ref 3.5–5.2)
ALBUMIN/GLOB SERPL: 1.5 G/DL
ALP SERPL-CCNC: 77 U/L (ref 39–117)
ALT SERPL W P-5'-P-CCNC: 8 U/L (ref 1–33)
ANION GAP SERPL CALCULATED.3IONS-SCNC: 8.4 MMOL/L (ref 5–15)
AST SERPL-CCNC: 16 U/L (ref 1–32)
BASOPHILS # BLD AUTO: 0.04 10*3/MM3 (ref 0–0.2)
BASOPHILS NFR BLD AUTO: 0.4 % (ref 0–1.5)
BILIRUB SERPL-MCNC: <0.2 MG/DL (ref 0–1.2)
BUN SERPL-MCNC: 11 MG/DL (ref 6–20)
BUN/CREAT SERPL: 16.7 (ref 7–25)
CALCIUM SPEC-SCNC: 8.8 MG/DL (ref 8.6–10.5)
CHLORIDE SERPL-SCNC: 104 MMOL/L (ref 98–107)
CO2 SERPL-SCNC: 22.6 MMOL/L (ref 22–29)
CREAT SERPL-MCNC: 0.66 MG/DL (ref 0.57–1)
DEPRECATED RDW RBC AUTO: 42.2 FL (ref 37–54)
EOSINOPHIL # BLD AUTO: 0.12 10*3/MM3 (ref 0–0.4)
EOSINOPHIL NFR BLD AUTO: 1.3 % (ref 0.3–6.2)
ERYTHROCYTE [DISTWIDTH] IN BLOOD BY AUTOMATED COUNT: 12.9 % (ref 12.3–15.4)
GFR SERPL CREATININE-BSD FRML MDRD: 104 ML/MIN/1.73
GLOBULIN UR ELPH-MCNC: 2.8 GM/DL
GLUCOSE SERPL-MCNC: 107 MG/DL (ref 65–99)
HCT VFR BLD AUTO: 38.8 % (ref 34–46.6)
HGB BLD-MCNC: 12.3 G/DL (ref 12–15.9)
IMM GRANULOCYTES # BLD AUTO: 0.04 10*3/MM3 (ref 0–0.05)
IMM GRANULOCYTES NFR BLD AUTO: 0.4 % (ref 0–0.5)
LYMPHOCYTES # BLD AUTO: 2.06 10*3/MM3 (ref 0.7–3.1)
LYMPHOCYTES NFR BLD AUTO: 21.8 % (ref 19.6–45.3)
MCH RBC QN AUTO: 28.1 PG (ref 26.6–33)
MCHC RBC AUTO-ENTMCNC: 31.7 G/DL (ref 31.5–35.7)
MCV RBC AUTO: 88.6 FL (ref 79–97)
MONOCYTES # BLD AUTO: 0.32 10*3/MM3 (ref 0.1–0.9)
MONOCYTES NFR BLD AUTO: 3.4 % (ref 5–12)
NEUTROPHILS NFR BLD AUTO: 6.89 10*3/MM3 (ref 1.7–7)
NEUTROPHILS NFR BLD AUTO: 72.7 % (ref 42.7–76)
NRBC BLD AUTO-RTO: 0 /100 WBC (ref 0–0.2)
PLATELET # BLD AUTO: 202 10*3/MM3 (ref 140–450)
PMV BLD AUTO: 10.7 FL (ref 6–12)
POTASSIUM SERPL-SCNC: 4.1 MMOL/L (ref 3.5–5.2)
PROT SERPL-MCNC: 7 G/DL (ref 6–8.5)
RBC # BLD AUTO: 4.38 10*6/MM3 (ref 3.77–5.28)
SODIUM SERPL-SCNC: 135 MMOL/L (ref 136–145)
WBC # BLD AUTO: 9.47 10*3/MM3 (ref 3.4–10.8)

## 2021-08-04 PROCEDURE — 80053 COMPREHEN METABOLIC PANEL: CPT

## 2021-08-04 PROCEDURE — 85025 COMPLETE CBC W/AUTO DIFF WBC: CPT

## 2021-08-04 PROCEDURE — 36415 COLL VENOUS BLD VENIPUNCTURE: CPT

## 2021-08-04 RX ORDER — METHYLPREDNISOLONE 4 MG/1
TABLET ORAL
Qty: 1 EACH | Refills: 0 | Status: SHIPPED | OUTPATIENT
Start: 2021-08-04 | End: 2021-08-10

## 2021-08-04 NOTE — TELEPHONE ENCOUNTER
Caller: CHERI    Relationship to patient: SELF    Chief complaint: MS PT - NUMBNESS OF LEFT LEG- HEAVINESS -REDFLAG WORD - TRANSFERED TO JOHNNIE

## 2021-08-04 NOTE — TELEPHONE ENCOUNTER
Spoke to patient and informed her Chen has [facility].

## 2021-08-04 NOTE — TELEPHONE ENCOUNTER
Spoke to patient and discussed about getting her in to see Shakira sooner, since she is experiencing numbness and tingling. She stated she also fell this morning because of the leg heaviness. She stated Shakira advised if numbness and tingling happened to call and Shakira would send in a steroid to the pharmacy.

## 2021-08-10 ENCOUNTER — LAB (OUTPATIENT)
Dept: LAB | Facility: HOSPITAL | Age: 31
End: 2021-08-10

## 2021-08-10 ENCOUNTER — OFFICE VISIT (OUTPATIENT)
Dept: NEUROLOGY | Facility: CLINIC | Age: 31
End: 2021-08-10

## 2021-08-10 VITALS
HEART RATE: 81 BPM | BODY MASS INDEX: 30.51 KG/M2 | OXYGEN SATURATION: 98 % | WEIGHT: 161.6 LBS | DIASTOLIC BLOOD PRESSURE: 88 MMHG | SYSTOLIC BLOOD PRESSURE: 126 MMHG | HEIGHT: 61 IN

## 2021-08-10 DIAGNOSIS — G35 MULTIPLE SCLEROSIS (HCC): Chronic | ICD-10-CM

## 2021-08-10 DIAGNOSIS — R20.2 NUMBNESS AND TINGLING: Chronic | ICD-10-CM

## 2021-08-10 DIAGNOSIS — R20.0 NUMBNESS AND TINGLING: Chronic | ICD-10-CM

## 2021-08-10 DIAGNOSIS — R41.840 CONCENTRATION DEFICIT: ICD-10-CM

## 2021-08-10 DIAGNOSIS — G35 MULTIPLE SCLEROSIS (HCC): ICD-10-CM

## 2021-08-10 DIAGNOSIS — F48.2 PBA (PSEUDOBULBAR AFFECT): Primary | ICD-10-CM

## 2021-08-10 LAB
BILIRUB UR QL STRIP: NEGATIVE
CLARITY UR: CLEAR
COLOR UR: YELLOW
GLUCOSE UR STRIP-MCNC: NEGATIVE MG/DL
HGB UR QL STRIP.AUTO: NEGATIVE
KETONES UR QL STRIP: NEGATIVE
LEUKOCYTE ESTERASE UR QL STRIP.AUTO: NEGATIVE
NITRITE UR QL STRIP: NEGATIVE
PH UR STRIP.AUTO: 7 [PH] (ref 5–8)
PROT UR QL STRIP: NEGATIVE
SP GR UR STRIP: 1.02 (ref 1–1.03)
UROBILINOGEN UR QL STRIP: NORMAL

## 2021-08-10 PROCEDURE — 99214 OFFICE O/P EST MOD 30 MIN: CPT | Performed by: NURSE PRACTITIONER

## 2021-08-10 PROCEDURE — 81001 URINALYSIS AUTO W/SCOPE: CPT

## 2021-08-10 PROCEDURE — 87086 URINE CULTURE/COLONY COUNT: CPT

## 2021-08-10 NOTE — PROGRESS NOTES
Subjective:     Patient ID: Avani Rm is a 30 y.o. female.    CC:   Chief Complaint   Patient presents with   • Multiple Sclerosis       HPI:   History of Present Illness   30 y.o. female returns in follow up for RRMS. At last appointment on 1/14/21 continued Tecfidera.     Labs 8/4/21: CBC, CMP - NCS Abs. Lymphs 2060    Patient called 8/4/21 with numbness and heaviness of the left leg, called in steroid and appointment moved up     Woke up 8/4 and had difficulty getting out of bed, fell and had an episode of bladder of incontinence, denies loss of consciousness. She reported numbness on the left side, arms and legs and felt heaviness. Denies tingling. Intermittent shooting pain along the spine. Took steroids. Feels better however her legs continue to feel heavy.       Taking Tecfidera twice per day, no longer taking ASA, having daily flushing episodes. Forgetful with taking her medication at times.     Cognitive deficits of forgetting to turn off on her exit while driving, not knowing what she is doing, will be sitting at work and forgets what she is supposed to be doing and how to do it. Brain fog and decreased concentration. Forgetting conversations she has had at work and making mistakes/forgetting to do work. Denies losing spaces of time, no loss of consciousness. She will sit and stare, but she does respond to people and snaps out of it. Denies syncope or pre-syncope sensation.      Periodic dizziness     Went on Vacation in July and forgot to take her Tecifdera with her and did not take her medication for one week.     Headaches at work.   HA frequency is daily, located in occiput and frontal. Intensity is mild. Characteristic of throbbing. Associated symptoms of brain fog/concentration deficit,   Denies N/V/photophobia, phonophobia     MSFC 8/10/21  25ft timed walk Trial 1 (Regular) : 7.43  25ft timed walk Trial 2  (Fast) : 5.66     Number of falls in the past year:: 2  Right SLS in seconds:: 20  Left  SLS in seconds:: 20  Right Hand Trial 1:: 21.99  Right Hand Trial 2:: 18.1  Left Hand Trial 1:: 22.19  Left Hand Trial 2:: 20.49  Dominant Hand?: Right   strength R Hand Trial 1: 56   strength R Hand Trial 2: 78   strength L Hand Trial 1: 69   strength L Hand Trial 2: 87  SDMT: 50  Written or verbal?: Written        PBA-CNS-LS: 29  Passed self-questionnaire: Yes  Passed 3oz water test: Yes            Mood  Waxes and wanes, feels more angry and argumentative lately.     Emotions difficult to control     CNS-LS 29      Problem History:  N/T of the left side of the body for about 2 weeks.   Over the past 3 days the numbness has improved to more of a faint numbness, more on the edge of the foot and pinky. Continues to feel tingles to touch throughout left side of the body.      Moderate fatigue     Heat intolerance severe      Fell twice, having balance difficulties intermittently      When glasses are off she will see twinkles in her vision - this has resolved only happened once for approximately 20 minutes.     Dis-coordination at times when using her left hand, feels as though her fine motor skills have decreased in the left hand.      Denies any pain, headaches, or weakness.     Medical Records:   MRI Brain 9/15/20, my review of films, moderate T2 lesion load with enhancing lesion in the right parietal centrum semiovale.      MRI Cervical spine 9/15/20, my review of films, minimal disc bulge at C5-6, and C6-7. No evidence of MS lesions.      LP with 3 OCB, IgG 10.1, IgG synthesis 37.4, protein 37.7     MOG and NMO - negative      Labs 9/21/20: Vitamin D 27.9, B12/Folate, CBC, CMP, Sjogrens, RF, Sed rate, ACE - NCS   BASSAM - positive       The following portions of the patient's history were reviewed and updated as appropriate: allergies, current medications, past family history, past medical history, past social history, past surgical history and problem list.    Past Medical History:   Diagnosis Date  "  • Fracture, radius and ulna, proximal     right       Past Surgical History:   Procedure Laterality Date   •  SECTION      x 3   •  SECTION WITH TUBAL     • ORIF ULNA/RADIUS FRACTURES Right 3/27/2018    Procedure: ULNA/RADIUS OPEN REDUCTION INTERNAL FIXATION;  Surgeon: Benja Keene MD;  Location: [facility];  Service: Orthopedics   • SALPINGECTOMY     • TONSILLECTOMY     • WISDOM TOOTH EXTRACTION         Social History     Socioeconomic History   • Marital status:      Spouse name: Not on file   • Number of children: Not on file   • Years of education: Not on file   • Highest education level: Not on file   Tobacco Use   • Smoking status: Current Every Day Smoker     Packs/day: 1.00     Years: 10.00     Pack years: 10.00     Types: Cigarettes   • Smokeless tobacco: Never Used   Substance and Sexual Activity   • Alcohol use: No   • Drug use: No   • Sexual activity: Defer       Family History   Problem Relation Age of Onset   • No Known Problems Mother    • No Known Problems Father    • No Known Problems Sister    • No Known Problems Brother    • No Known Problems Maternal Aunt    • No Known Problems Maternal Uncle    • No Known Problems Paternal Aunt    • No Known Problems Paternal Uncle    • No Known Problems Maternal Grandmother    • No Known Problems Maternal Grandfather    • No Known Problems Paternal Grandmother    • No Known Problems Paternal Grandfather    • No Known Problems Other    • Ataxia Neg Hx    • Chorea Neg Hx    • Dementia Neg Hx    • Mental retardation Neg Hx    • Migraines Neg Hx    • Multiple sclerosis Neg Hx    • Neurofibromatosis Neg Hx    • Neuropathy Neg Hx    • Parkinsonism Neg Hx    • Seizures Neg Hx    • Stroke Neg Hx         Objective:  /88   Pulse 81   Ht 154.9 cm (61\")   Wt 73.3 kg (161 lb 9.6 oz)   SpO2 98%   BMI 30.53 kg/m²     Neurologic Exam     Mental Status   Oriented to person, place, and time.   Speech: speech is normal   Level of " consciousness: alert  Knowledge: good and consistent with education.   Able to name object. Able to read. Able to repeat. Able to write. Normal comprehension.     Cranial Nerves     CN II   Visual fields full to confrontation.   Visual acuity: normal  Right visual field deficit: none  Left visual field deficit: none     CN III, IV, VI   Pupils are equal, round, and reactive to light.  Extraocular motions are normal.   Right pupil: Shape: regular. Reactivity: brisk. Consensual response: intact.   Left pupil: Shape: regular. Reactivity: brisk. Consensual response: intact.   Nystagmus: none   Diplopia: none  Ophthalmoparesis: none  Upgaze: normal  Downgaze: normal  Conjugate gaze: present  Vestibulo-ocular reflex: present    CN V   Facial sensation intact.   Right corneal reflex: normal  Left corneal reflex: normal    CN VII   Right facial weakness: none  Left facial weakness: none    CN VIII   Hearing: intact    CN IX, X   Palate: symmetric  Right gag reflex: normal  Left gag reflex: normal    CN XI   Right sternocleidomastoid strength: normal  Left sternocleidomastoid strength: normal    CN XII   Tongue: not atrophic  Fasciculations: absent  Tongue deviation: none    Motor Exam   Muscle bulk: normal  Overall muscle tone: normal  Right arm tone: normal  Left arm tone: normal  Right leg tone: normal  Left leg tone: normal    Strength   Strength 5/5 throughout.     Sensory Exam   Light touch normal.     Gait, Coordination, and Reflexes     Coordination   Finger to nose coordination: normal  Tandem walking coordination: abnormal    Tremor   Resting tremor: absent  Intention tremor: absent  Action tremor: absent    Reflexes   Reflexes 2+ except as noted.       Physical Exam  Vitals and nursing note reviewed.   Constitutional:       Appearance: Normal appearance.   HENT:      Head: Normocephalic and atraumatic.   Eyes:      Extraocular Movements: Extraocular movements intact and EOM normal.      Pupils: Pupils are equal,  round, and reactive to light.   Skin:     General: Skin is warm and dry.   Neurological:      Mental Status: She is alert and oriented to person, place, and time.      Coordination: Finger-Nose-Finger Test normal.      Gait: Tandem walk abnormal.      Deep Tendon Reflexes: Strength normal.   Psychiatric:         Mood and Affect: Mood normal.         Speech: Speech normal.         Assessment/Plan:       Diagnoses and all orders for this visit:    1. PBA (pseudobulbar affect) (Primary)  Assessment & Plan:  Start Nuedexta 1 pill daily X 1 week then twice daily thereafter.       2. Multiple sclerosis (CMS/[facility])  Assessment & Plan:  Improvement in MSFC in all aspects except CNS-LS today when compared to 10/5/20.     Ordered MRI's, labs UTD      Ordered UA to r/o UTI with recent pseudo-relapse and bladder incontinence.     Add ASA 81 mg PO daily to assist with flushing.     Discussed switching to Aubagio for a once per day dosing to more easily remember to take her medication. Will use pill box, alarms, and calendars to assist with reminding to take her medication. She will review Aubagio information and we will discuss at F/U.     F/U in 6 weeks or sooner if needed         Orders:  -     MRI Brain With & Without Contrast; Future  -     MRI Cervical Spine With & Without Contrast; Future  -     Urinalysis With Microscopic - Urine, Clean Catch; Future  -     Urine Culture - Urine, Urine, Clean Catch; Future  -     Ambulatory Referral to Speech Therapy    3. Numbness and tingling    4. Concentration deficit  Assessment & Plan:  Referred to SLP     Hopeful this will improve with PBA treatment.     Orders:  -     Ambulatory Referral to Speech Therapy           Reviewed medications, potential side effects and signs and symptoms to report. Discussed risk versus benefits of treatment plan with patient and/or family-including medications, labs and radiology that may be ordered. Addressed questions and concerns during visit. Patient  and/or family verbalized understanding and agree with plan. Patient instructed to call the office with questions or concerns and report to ED with life-threatening symptoms.     AS THE PROVIDER, I PERSONALLY WORE PPE DURING ENTIRE FACE TO FACE ENCOUNTER IN CLINIC WITH THE PATIENT. PATIENT ALSO WORE PPE DURING ENTIRE FACE TO FACE ENCOUNTER EXCEPT FOR A MAX OF 30 SECONDS DURING NEUROLOGICAL EVALUATION OF CRANIAL NERVES AND THEN MASK WAS PLACED BACK OVER PATIENT FACE FOR REMAINDER OF VISIT. I WASHED MY HANDS BEFORE AND AFTER VISIT.    During this visit the following were done:  Labs Reviewed [x]    Labs Ordered [x]    Radiology Reports Reviewed []    Radiology Ordered [x]    PCP Records Reviewed []    Referring Provider Records Reviewed []    ER Records Reviewed []    Hospital Records Reviewed []    History Obtained From Family []    Radiology Images Reviewed []    Other Reviewed []    Records Requested []      Return in about 6 weeks (around 9/21/2021).      Shakira Mantilla, APRN  8/10/2021

## 2021-08-10 NOTE — ASSESSMENT & PLAN NOTE
Improvement in MSFC in all aspects except CNS-LS today when compared to 10/5/20.     Ordered MRI's, labs UTD      Ordered UA to r/o UTI with recent pseudo-relapse and bladder incontinence.     Add ASA 81 mg PO daily to assist with flushing.     Discussed switching to Aubagio for a once per day dosing to more easily remember to take her medication. Will use pill box, alarms, and calendars to assist with reminding to take her medication. She will review Aubagio information and we will discuss at F/U.     F/U in 6 weeks or sooner if needed

## 2021-08-11 ENCOUNTER — TELEPHONE (OUTPATIENT)
Dept: NEUROLOGY | Facility: CLINIC | Age: 31
End: 2021-08-11

## 2021-08-11 LAB
BACTERIA SPEC AEROBE CULT: NO GROWTH
BACTERIA UR QL AUTO: NORMAL /HPF
HYALINE CASTS UR QL AUTO: NORMAL /LPF
RBC # UR: NORMAL /HPF
REF LAB TEST METHOD: NORMAL
SQUAMOUS #/AREA URNS HPF: NORMAL /HPF
WBC UR QL AUTO: NORMAL /HPF

## 2021-08-11 NOTE — TELEPHONE ENCOUNTER
----- Message from PEGGY Perry sent at 8/11/2021  9:57 AM EDT -----  Please let Avani know that her urinalysis was normal.   Thanks!

## 2021-08-17 ENCOUNTER — SPECIALTY PHARMACY (OUTPATIENT)
Dept: ONCOLOGY | Facility: HOSPITAL | Age: 31
End: 2021-08-17

## 2021-08-17 ENCOUNTER — TELEPHONE (OUTPATIENT)
Dept: PHYSICAL THERAPY | Facility: CLINIC | Age: 31
End: 2021-08-17

## 2021-08-17 NOTE — PROGRESS NOTES
Mailed written educational material for Nuedexta. Provided with my contact information and instructed to call if any questions/concerns should arise.

## 2021-08-17 NOTE — TELEPHONE ENCOUNTER
Consult received. Order reviewed.  SLP contacts pt via telephone to schedule evaluation. Pt reports she is unsure when she can attend and states she will call back to schedule. SLP provides pt w/ office phone number to call back for scheduling.    Thank you  Stephen Salomon MA CCC-SLP

## 2021-09-09 ENCOUNTER — HOSPITAL ENCOUNTER (EMERGENCY)
Facility: HOSPITAL | Age: 31
Discharge: HOME | End: 2021-09-09
Attending: EMERGENCY MEDICINE | Admitting: EMERGENCY MEDICINE

## 2021-09-09 ENCOUNTER — APPOINTMENT (OUTPATIENT)
Dept: GENERAL RADIOLOGY | Facility: HOSPITAL | Age: 31
End: 2021-09-09

## 2021-09-09 VITALS
BODY MASS INDEX: 26.68 KG/M2 | DIASTOLIC BLOOD PRESSURE: 76 MMHG | SYSTOLIC BLOOD PRESSURE: 114 MMHG | HEIGHT: 62 IN | WEIGHT: 145 LBS | TEMPERATURE: 98.7 F | RESPIRATION RATE: 20 BRPM | OXYGEN SATURATION: 98 % | HEART RATE: 60 BPM

## 2021-09-09 DIAGNOSIS — R07.9 CHEST PAIN IN ADULT: Primary | ICD-10-CM

## 2021-09-09 LAB
ALBUMIN SERPL-MCNC: 4.08 G/DL (ref 3.5–5.2)
ALBUMIN/GLOB SERPL: 1.7 G/DL
ALP SERPL-CCNC: 65 U/L (ref 39–117)
ALT SERPL W P-5'-P-CCNC: 8 U/L (ref 1–33)
ANION GAP SERPL CALCULATED.3IONS-SCNC: 10.8 MMOL/L (ref 5–15)
AST SERPL-CCNC: 14 U/L (ref 1–32)
BASOPHILS # BLD AUTO: 0.04 10*3/MM3 (ref 0–0.2)
BASOPHILS NFR BLD AUTO: 0.4 % (ref 0–1.5)
BILIRUB SERPL-MCNC: 0.3 MG/DL (ref 0–1.2)
BUN SERPL-MCNC: 8 MG/DL (ref 6–20)
BUN/CREAT SERPL: 11.3 (ref 7–25)
CALCIUM SPEC-SCNC: 9 MG/DL (ref 8.6–10.5)
CHLORIDE SERPL-SCNC: 107 MMOL/L (ref 98–107)
CO2 SERPL-SCNC: 22.2 MMOL/L (ref 22–29)
CREAT SERPL-MCNC: 0.71 MG/DL (ref 0.57–1)
D DIMER PPP FEU-MCNC: 0.29 MCGFEU/ML (ref 0–0.5)
DEPRECATED RDW RBC AUTO: 40.5 FL (ref 37–54)
EOSINOPHIL # BLD AUTO: 0.18 10*3/MM3 (ref 0–0.4)
EOSINOPHIL NFR BLD AUTO: 2 % (ref 0.3–6.2)
ERYTHROCYTE [DISTWIDTH] IN BLOOD BY AUTOMATED COUNT: 12.6 % (ref 12.3–15.4)
GFR SERPL CREATININE-BSD FRML MDRD: 95 ML/MIN/1.73
GLOBULIN UR ELPH-MCNC: 2.4 GM/DL
GLUCOSE SERPL-MCNC: 110 MG/DL (ref 65–99)
HCT VFR BLD AUTO: 39.3 % (ref 34–46.6)
HGB BLD-MCNC: 12.4 G/DL (ref 12–15.9)
HOLD SPECIMEN: NORMAL
HOLD SPECIMEN: NORMAL
IMM GRANULOCYTES # BLD AUTO: 0.03 10*3/MM3 (ref 0–0.05)
IMM GRANULOCYTES NFR BLD AUTO: 0.3 % (ref 0–0.5)
LYMPHOCYTES # BLD AUTO: 2.38 10*3/MM3 (ref 0.7–3.1)
LYMPHOCYTES NFR BLD AUTO: 25.9 % (ref 19.6–45.3)
MCH RBC QN AUTO: 27.6 PG (ref 26.6–33)
MCHC RBC AUTO-ENTMCNC: 31.6 G/DL (ref 31.5–35.7)
MCV RBC AUTO: 87.3 FL (ref 79–97)
MONOCYTES # BLD AUTO: 0.42 10*3/MM3 (ref 0.1–0.9)
MONOCYTES NFR BLD AUTO: 4.6 % (ref 5–12)
NEUTROPHILS NFR BLD AUTO: 6.15 10*3/MM3 (ref 1.7–7)
NEUTROPHILS NFR BLD AUTO: 66.8 % (ref 42.7–76)
NRBC BLD AUTO-RTO: 0 /100 WBC (ref 0–0.2)
PLATELET # BLD AUTO: 193 10*3/MM3 (ref 140–450)
PMV BLD AUTO: 10.7 FL (ref 6–12)
POTASSIUM SERPL-SCNC: 3.8 MMOL/L (ref 3.5–5.2)
PROT SERPL-MCNC: 6.5 G/DL (ref 6–8.5)
RBC # BLD AUTO: 4.5 10*6/MM3 (ref 3.77–5.28)
SODIUM SERPL-SCNC: 140 MMOL/L (ref 136–145)
TROPONIN T SERPL-MCNC: <0.01 NG/ML (ref 0–0.03)
WBC # BLD AUTO: 9.2 10*3/MM3 (ref 3.4–10.8)
WHOLE BLOOD HOLD SPECIMEN: NORMAL
WHOLE BLOOD HOLD SPECIMEN: NORMAL

## 2021-09-09 PROCEDURE — 71045 X-RAY EXAM CHEST 1 VIEW: CPT

## 2021-09-09 PROCEDURE — 99284 EMERGENCY DEPT VISIT MOD MDM: CPT

## 2021-09-09 PROCEDURE — 80053 COMPREHEN METABOLIC PANEL: CPT | Performed by: PHYSICIAN ASSISTANT

## 2021-09-09 PROCEDURE — 85025 COMPLETE CBC W/AUTO DIFF WBC: CPT | Performed by: PHYSICIAN ASSISTANT

## 2021-09-09 PROCEDURE — 71045 X-RAY EXAM CHEST 1 VIEW: CPT | Performed by: RADIOLOGY

## 2021-09-09 PROCEDURE — 93005 ELECTROCARDIOGRAM TRACING: CPT | Performed by: EMERGENCY MEDICINE

## 2021-09-09 PROCEDURE — 84484 ASSAY OF TROPONIN QUANT: CPT | Performed by: PHYSICIAN ASSISTANT

## 2021-09-09 PROCEDURE — 85379 FIBRIN DEGRADATION QUANT: CPT | Performed by: PHYSICIAN ASSISTANT

## 2021-09-09 RX ORDER — ASPIRIN 81 MG/1
324 TABLET, CHEWABLE ORAL ONCE
Status: COMPLETED | OUTPATIENT
Start: 2021-09-09 | End: 2021-09-09

## 2021-09-09 RX ORDER — SODIUM CHLORIDE 0.9 % (FLUSH) 0.9 %
10 SYRINGE (ML) INJECTION AS NEEDED
Status: DISCONTINUED | OUTPATIENT
Start: 2021-09-09 | End: 2021-09-09 | Stop reason: HOSPADM

## 2021-09-09 RX ADMIN — ASPIRIN 324 MG: 81 TABLET, CHEWABLE ORAL at 09:22

## 2021-09-09 NOTE — ED PROVIDER NOTES
Subjective   30-year-old female who presents to the emergency department with chief complaint left-sided chest discomfort.  Patient states she has had left-sided chest pain described as sharp, stabbing.  Patient does state pain is worse with deep inspiration.  Patient denies any fever, chills, body aches.      History provided by:  Patient   used: No    Chest Pain  Pain location:  L chest  Pain quality: sharp and stabbing    Pain radiates to:  Mid back  Pain severity:  Moderate  Onset quality:  Gradual  Duration:  2 days  Timing:  Intermittent  Progression:  Worsening  Chronicity:  New  Context: not breathing, not drug use, not eating, not intercourse, not lifting, not movement, not raising an arm, not at rest and not stress    Relieved by:  Nothing  Worsened by:  Nothing  Ineffective treatments:  None tried  Associated symptoms: no abdominal pain, no AICD problem, no altered mental status, no anorexia, no back pain, no cough, no fatigue, no fever, no headache, no heartburn, no lower extremity edema, no numbness, no orthopnea, no palpitations, no shortness of breath, no syncope and no vomiting    Risk factors: no aortic disease, no birth control, no diabetes mellitus, no hypertension, no immobilization, not male, no Marfan's syndrome, not obese, not pregnant, no prior DVT/PE, no smoking and no surgery        Review of Systems   Constitutional: Negative.  Negative for appetite change, chills, fatigue and fever.   HENT: Negative.  Negative for dental problem, drooling, ear discharge and nosebleeds.    Eyes: Negative.  Negative for photophobia, pain, discharge and itching.   Respiratory: Positive for chest tightness. Negative for cough and shortness of breath.    Cardiovascular: Positive for chest pain. Negative for palpitations, orthopnea, leg swelling and syncope.   Gastrointestinal: Negative.  Negative for abdominal pain, anal bleeding, anorexia, blood in stool, constipation, diarrhea, heartburn  and vomiting.   Genitourinary: Negative.  Negative for dysuria, enuresis, flank pain, frequency, genital sores, hematuria, menstrual problem, pelvic pain and urgency.   Musculoskeletal: Negative.  Negative for back pain, joint swelling and neck pain.   Skin: Negative.  Negative for color change, pallor and rash.   Neurological: Negative.  Negative for seizures, speech difficulty, numbness and headaches.   Hematological: Negative.    Psychiatric/Behavioral: Negative.  Negative for agitation, behavioral problems, decreased concentration, dysphoric mood, hallucinations and self-injury. The patient is not hyperactive.    All other systems reviewed and are negative.      Past Medical History:   Diagnosis Date   • Fracture, radius and ulna, proximal     right       Allergies   Allergen Reactions   • Codeine Unknown (See Comments)     Pt had reaction in childhood, unsure of reaction       Past Surgical History:   Procedure Laterality Date   •  SECTION      x 3   •  SECTION WITH TUBAL     • ORIF ULNA/RADIUS FRACTURES Right 3/27/2018    Procedure: ULNA/RADIUS OPEN REDUCTION INTERNAL FIXATION;  Surgeon: Benja Keene MD;  Location: [facility];  Service: Orthopedics   • SALPINGECTOMY     • TONSILLECTOMY     • WISDOM TOOTH EXTRACTION         Family History   Problem Relation Age of Onset   • No Known Problems Mother    • No Known Problems Father    • No Known Problems Sister    • No Known Problems Brother    • No Known Problems Maternal Aunt    • No Known Problems Maternal Uncle    • No Known Problems Paternal Aunt    • No Known Problems Paternal Uncle    • No Known Problems Maternal Grandmother    • No Known Problems Maternal Grandfather    • No Known Problems Paternal Grandmother    • No Known Problems Paternal Grandfather    • No Known Problems Other    • Ataxia Neg Hx    • Chorea Neg Hx    • Dementia Neg Hx    • Mental retardation Neg Hx    • Migraines Neg Hx    • Multiple sclerosis Neg Hx    •  Neurofibromatosis Neg Hx    • Neuropathy Neg Hx    • Parkinsonism Neg Hx    • Seizures Neg Hx    • Stroke Neg Hx        Social History     Socioeconomic History   • Marital status:      Spouse name: Not on file   • Number of children: Not on file   • Years of education: Not on file   • Highest education level: Not on file   Tobacco Use   • Smoking status: Current Every Day Smoker     Packs/day: 1.00     Years: 10.00     Pack years: 10.00     Types: Cigarettes   • Smokeless tobacco: Never Used   Substance and Sexual Activity   • Alcohol use: No   • Drug use: No   • Sexual activity: Defer           Objective   Physical Exam  Vitals and nursing note reviewed.   Constitutional:       General: She is not in acute distress.     Appearance: She is well-developed and normal weight. She is not ill-appearing, toxic-appearing or diaphoretic.   HENT:      Head: Normocephalic and atraumatic.   Eyes:      Extraocular Movements: Extraocular movements intact.      Pupils: Pupils are equal, round, and reactive to light.   Neck:      Thyroid: No thyromegaly.      Vascular: No hepatojugular reflux or JVD.      Trachea: No tracheal deviation.   Cardiovascular:      Rate and Rhythm: Normal rate and regular rhythm.      Pulses:           Carotid pulses are 2+ on the right side and 2+ on the left side.       Radial pulses are 2+ on the right side and 2+ on the left side.        Dorsalis pedis pulses are 2+ on the right side and 2+ on the left side.        Posterior tibial pulses are 2+ on the right side and 2+ on the left side.      Heart sounds: Normal heart sounds. Heart sounds not distant. No murmur heard.     Pulmonary:      Effort: Pulmonary effort is normal. No tachypnea, accessory muscle usage or respiratory distress.      Breath sounds: Normal breath sounds. No decreased breath sounds, wheezing or rhonchi.   Abdominal:      General: Bowel sounds are normal. There is no abdominal bruit.      Palpations: Abdomen is soft.  There is no fluid wave, hepatomegaly, splenomegaly or mass.      Tenderness: There is no abdominal tenderness. There is no guarding or rebound.   Musculoskeletal:         General: Normal range of motion.      Cervical back: Normal range of motion and neck supple.      Right lower leg: No tenderness. No edema.      Left lower leg: No tenderness. No edema.   Lymphadenopathy:      Cervical: No cervical adenopathy.   Skin:     General: Skin is warm and dry.      Capillary Refill: Capillary refill takes less than 2 seconds.      Coloration: Skin is not cyanotic or pale.      Findings: No ecchymosis, erythema or rash.      Nails: There is no clubbing.   Neurological:      General: No focal deficit present.      Mental Status: She is alert and oriented to person, place, and time.      Cranial Nerves: No cranial nerve deficit.      Motor: No weakness.   Psychiatric:         Mood and Affect: Mood normal. Mood is not anxious.         Behavior: Behavior normal. Behavior is not agitated.         Procedures           ED Course  ED Course as of Sep 09 1109   Thu Sep 09, 2021   1059 Discussed care with patient. Advised to return if condition worsens.     []      ED Course User Index  [] Reji Corrales PA-C                                           [facility]    Final diagnoses:   Chest pain in adult       ED Disposition  ED Disposition     ED Disposition Condition Comment    Discharge Stable           No follow-up provider specified.       Medication List      No changes were made to your prescriptions during this visit.          Reji Corrales PA-C  09/09/21 [id number]

## 2021-09-10 LAB
QT INTERVAL: 386 MS
QTC INTERVAL: 440 MS

## 2021-12-13 ENCOUNTER — TELEPHONE (OUTPATIENT)
Dept: NEUROLOGY | Facility: CLINIC | Age: 31
End: 2021-12-13

## 2021-12-13 NOTE — TELEPHONE ENCOUNTER
"----- Message from Zara Minor MA sent at 12/13/2021  8:30 AM EST -----  Regarding: Cancellation of Order # [id number]  Order number [id number] for the procedure MRI THORACIC SPINE W WO   CONTRAST [[id number]] has been canceled by Zara Minor MA   [[id number]]. This procedure was ordered by you on Sep 21, 2020 for   the patient Avani Rm [[id number]]. The reason for   cancellation was \"Other\".    This was a future order.  "

## 2021-12-13 NOTE — TELEPHONE ENCOUNTER
MRIs reordered. Patient informed that once scheduled, to contact office to schedule post MRI follow up.   -TMT

## 2021-12-15 ENCOUNTER — SPECIALTY PHARMACY (OUTPATIENT)
Dept: ONCOLOGY | Facility: HOSPITAL | Age: 31
End: 2021-12-15

## 2021-12-17 ENCOUNTER — TELEPHONE (OUTPATIENT)
Dept: NEUROLOGY | Facility: CLINIC | Age: 31
End: 2021-12-17

## 2021-12-17 RX ORDER — METHYLPREDNISOLONE 4 MG/1
TABLET ORAL
Qty: 21 TABLET | Refills: 0 | Status: SHIPPED | OUTPATIENT
Start: 2021-12-17 | End: 2022-07-28

## 2021-12-17 NOTE — TELEPHONE ENCOUNTER
Caller: CHERI   Relationship to Patient: SELF  Phone Number: [phone]    Reason for Call: PT WAS CALLING TO SEE IF A MEDICATION COULD BE CALLED IN FOR HER.     SHE CURRENTLY IS EXPRESSING THE FOLLOWING SYMPTOMS:    -LEFT HAND FEELS HEAVY    -WHEN BLINKING SHE HAS A WEIRD FEELING ( THE BEST WAY SHE CAN EXPLAIN IT WAS WHEN SHE BLINKS IT MAKES A WEIRD FEELING LIKE BLINKING IS SENDING SIGNALS TO HER BRAIN)     -SLOW THINKING     -SEEING FLOATERS    PT THINKS SHE IS HAVING A MS RELAPSE     PLEASE REVIEW AND ADVISE.    I CALLED CLINIC TO SEE IF TAMEKA WAS AVAILABLE BUT SHE WAS NOT.

## 2021-12-17 NOTE — TELEPHONE ENCOUNTER
As I was here after hours on another phone call and saw this high priority message come through, called Avani back and notified our clinic is closed, there are no providers here, however we always have an on call neurologist if she calls the mainline someone should be able to direct her message to one of the on call physicians.    Patient feels like she is relapsing.   Probably needing/wanting a steroid dose harpal as she is asking for something to be sent in to help with her symptoms but I am unable to prescribe medications.     She states understanding and will get in touch with our after hours line. Thanks!

## 2021-12-20 ENCOUNTER — TELEPHONE (OUTPATIENT)
Dept: NEUROLOGY | Facility: CLINIC | Age: 31
End: 2021-12-20

## 2021-12-20 NOTE — TELEPHONE ENCOUNTER
Provider: KEVIN  Caller: PT  Relationship to Patient: SELF  Pharmacy: [id number]  Phone Number: [phone]  Reason for Call: PT IS FOLLOWING UP RE: RX THAT WAS ORDERED ON 12/17/21; HAS CALLED THE PHARMACY ON RECORD & NO INFO.    PLEASE ADVISE.    THANK YOU.

## 2022-03-08 ENCOUNTER — TELEPHONE (OUTPATIENT)
Dept: NEUROLOGY | Facility: CLINIC | Age: 32
End: 2022-03-08

## 2022-03-08 DIAGNOSIS — G35 MULTIPLE SCLEROSIS: Primary | ICD-10-CM

## 2022-03-08 NOTE — TELEPHONE ENCOUNTER
Notified patient that MRIs have been ordered and Shakira placed orders for blood work. Patient verbalized understanding.

## 2022-03-08 NOTE — TELEPHONE ENCOUNTER
Caller: CHERI  Relationship to Patient: PT  Phone Number: [phone]    Reason for Call: PT NEEDS ALL THESE ORDERS TO TO RE-SUBMITTED AS SHE FORGOT TO GET COMPLETED AND SHE CAN NOT JANNIE WITHOUT NEW ORDERS. PLEASE REVIEW AND ADVISE.     MRI Brain With & Without Contrast; Future  -     MRI Cervical Spine With & Without Contrast; Future  -     Urinalysis With Microscopic - Urine, Clean Catch; Future  -     Urine Culture - Urine, Urine, Clean Catch; Future

## 2022-04-15 ENCOUNTER — APPOINTMENT (OUTPATIENT)
Dept: MRI IMAGING | Facility: HOSPITAL | Age: 32
End: 2022-04-15

## 2022-04-15 ENCOUNTER — HOSPITAL ENCOUNTER (OUTPATIENT)
Dept: MRI IMAGING | Facility: HOSPITAL | Age: 32
Discharge: HOME | End: 2022-04-15
Admitting: NURSE PRACTITIONER

## 2022-04-15 DIAGNOSIS — G35 MULTIPLE SCLEROSIS: ICD-10-CM

## 2022-04-15 PROCEDURE — A9577 INJ MULTIHANCE: HCPCS | Performed by: NURSE PRACTITIONER

## 2022-04-15 PROCEDURE — 70553 MRI BRAIN STEM W/O & W/DYE: CPT

## 2022-04-15 PROCEDURE — 0 GADOBENATE DIMEGLUMINE 529 MG/ML SOLUTION: Performed by: NURSE PRACTITIONER

## 2022-04-15 RX ADMIN — GADOBENATE DIMEGLUMINE 14 ML: 529 INJECTION, SOLUTION INTRAVENOUS at 11:57

## 2022-06-23 ENCOUNTER — TELEPHONE (OUTPATIENT)
Dept: NEUROLOGY | Facility: CLINIC | Age: 32
End: 2022-06-23

## 2022-06-23 DIAGNOSIS — G35 MULTIPLE SCLEROSIS: Primary | ICD-10-CM

## 2022-06-23 RX ORDER — METHYLPREDNISOLONE 4 MG/1
TABLET ORAL
Qty: 1 EACH | Refills: 0 | Status: SHIPPED | OUTPATIENT
Start: 2022-06-23 | End: 2022-07-28

## 2022-06-23 NOTE — TELEPHONE ENCOUNTER
Provider: DR. HENRY  Caller: CHERI EASLEY  Relationship to Patient: SELF  Pharmacy: [facility]    Reason for Call: PT CALLING STATING THAT SHE IS HAVING A MS RELAPSE AND USUALLY DAHLIA WOULD CALL HER IN A STEROID PACK. PT IS ASKING IF A STEROID PACK COULD BE CALLED INTO THE [facility].      SHE ALSO STATES THAT SHE NEEDS TO GET THE MRI CERVICAL SPINE W/WO, NEEDS TO KNOW IF THIS ORDER IS STILL GOOD IF NOT COULD A NEW ORDER BE PUT IN?.    When was the patient last seen: 8-    PLEASE CALL HER BACK [phone]

## 2022-06-23 NOTE — TELEPHONE ENCOUNTER
Patient is having numbness in bilateral hands and feet. Having issues holding phone in left hand. Patient is having headaches, left side of head shooting pains from front to back of head. Patient states they only last for a few seconds. Patient is worried to get up and move too much due to issues with numbness and being unsteady. Patient stated she is not doing well taking her Tecfidera and needs to be better at doing that. I did get patient scheduled to see Chen Shin 7/5/2022 to discuss other medication options. Patient was wanting to know if she could get a medrol dose pack.

## 2022-07-05 ENCOUNTER — OFFICE VISIT (OUTPATIENT)
Dept: NEUROLOGY | Facility: CLINIC | Age: 32
End: 2022-07-05

## 2022-07-05 ENCOUNTER — LAB (OUTPATIENT)
Dept: LAB | Facility: HOSPITAL | Age: 32
End: 2022-07-05

## 2022-07-05 VITALS
WEIGHT: 161 LBS | SYSTOLIC BLOOD PRESSURE: 104 MMHG | OXYGEN SATURATION: 99 % | HEIGHT: 62 IN | DIASTOLIC BLOOD PRESSURE: 70 MMHG | HEART RATE: 70 BPM | BODY MASS INDEX: 29.63 KG/M2 | TEMPERATURE: 97.3 F

## 2022-07-05 DIAGNOSIS — F48.2 PBA (PSEUDOBULBAR AFFECT): ICD-10-CM

## 2022-07-05 DIAGNOSIS — G35 MULTIPLE SCLEROSIS: ICD-10-CM

## 2022-07-05 DIAGNOSIS — G35 MULTIPLE SCLEROSIS: Primary | ICD-10-CM

## 2022-07-05 PROCEDURE — 99214 OFFICE O/P EST MOD 30 MIN: CPT | Performed by: NURSE PRACTITIONER

## 2022-07-05 PROCEDURE — 86334 IMMUNOFIX E-PHORESIS SERUM: CPT | Performed by: NURSE PRACTITIONER

## 2022-07-05 PROCEDURE — 81003 URINALYSIS AUTO W/O SCOPE: CPT | Performed by: NURSE PRACTITIONER

## 2022-07-05 PROCEDURE — 85025 COMPLETE CBC W/AUTO DIFF WBC: CPT | Performed by: NURSE PRACTITIONER

## 2022-07-05 PROCEDURE — 82784 ASSAY IGA/IGD/IGG/IGM EACH: CPT | Performed by: NURSE PRACTITIONER

## 2022-07-05 PROCEDURE — 80074 ACUTE HEPATITIS PANEL: CPT | Performed by: NURSE PRACTITIONER

## 2022-07-05 PROCEDURE — 84165 PROTEIN E-PHORESIS SERUM: CPT | Performed by: NURSE PRACTITIONER

## 2022-07-05 PROCEDURE — 80053 COMPREHEN METABOLIC PANEL: CPT | Performed by: NURSE PRACTITIONER

## 2022-07-05 PROCEDURE — 36415 COLL VENOUS BLD VENIPUNCTURE: CPT

## 2022-07-05 NOTE — PROGRESS NOTES
Neuro Office Visit      Encounter Date: 2022   Patient Name: Avani Rm  : 1990   MRN: [id number]   PCP:   Chief Complaint:    Chief Complaint   Patient presents with   • Multiple Sclerosis       History of Present Illness: Avani Rm is a 31 y.o. female who is here today in Neurology for multiple sclerosis, HA.    Last visit 8/10/2021 w Shakira Winebrenner, APRN-start nuedexta, MRI brain and c-spine. Add asa 81 for flushing    MRI Brain With & Without Contrast (04/15/2022 11:58)      Interval hx-worsening N/T-steroids sent in 22    RRMS  Taking Tecfidera. Compliance issues. Missing doses frequently. No side effects. 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    Cognitive changes: having cognitive changes. . Can get overwhelmed.   Vision changes: none  Slurred speech: slurred speech in afternoon  Dysphagia: none  Bladder:  Urinary frequency and urgency. Once a month with full bladder incontinence. No warning.  Bowel: mild constipation. No treatment  Skin: no rash  Paresthesia: Numbness previously left and now bilat. Less intense on left. Feet are tingling. Mostly irritating.  Weakness: dropping items. Bilat L>R  Fatigue: energy is low. Going to work but not functioning well. Falling asleep while seated. Full time job and full time student.  Pain  Moods: Sad and irritable. Unable to be compliant w Neudexta  Gait: 2 falls   Falls:MS Hug: left thoracic pressure and pain      PH  N/T left side of body. MRI brain 9/15/20 w mod T2 load with enhancing lesion right parietal centrum semiovale.  MRI c-spine min bulging disc C5-6, C6-7-no lesions  LP w 3 OCB, IgG 10.1, IgG synth 37.4, prot 37.7  NMO/MOG-neg  BASSAM+  DMT: Tecfidera.    HA  Freq; most days  Location; left temple band distribution  Quality: sharp  Severity: severe  Duration: few minutes  Assoc sx: dizziness and confusion  Aura: none    Subjective      Past Medical History:   Past Medical History:   Diagnosis  Date   • Fracture, radius and ulna, proximal     right   • Multiple sclerosis (HCC) 10/5/2020   • PBA (pseudobulbar affect) 8/10/2021       Past Surgical History:   Past Surgical History:   Procedure Laterality Date   •  SECTION      x 3   •  SECTION WITH TUBAL     • ORIF ULNA/RADIUS FRACTURES Right 3/27/2018    Procedure: ULNA/RADIUS OPEN REDUCTION INTERNAL FIXATION;  Surgeon: Benja Keene MD;  Location: [facility];  Service: Orthopedics   • SALPINGECTOMY     • TONSILLECTOMY     • WISDOM TOOTH EXTRACTION         Family History:   Family History   Problem Relation Age of Onset   • No Known Problems Mother    • No Known Problems Father    • No Known Problems Sister    • No Known Problems Brother    • No Known Problems Maternal Grandmother    • No Known Problems Maternal Grandfather    • No Known Problems Paternal Grandmother    • Cancer Paternal Grandfather    • No Known Problems Maternal Aunt    • No Known Problems Maternal Uncle    • No Known Problems Paternal Aunt    • No Known Problems Paternal Uncle    • No Known Problems Other    • Ataxia Neg Hx    • Chorea Neg Hx    • Dementia Neg Hx    • Mental retardation Neg Hx    • Migraines Neg Hx    • Multiple sclerosis Neg Hx    • Neurofibromatosis Neg Hx    • Neuropathy Neg Hx    • Parkinsonism Neg Hx    • Seizures Neg Hx    • Stroke Neg Hx        Social History:   Social History     Socioeconomic History   • Marital status:    Tobacco Use   • Smoking status: Current Every Day Smoker     Packs/day: 1.00     Years: 10.00     Pack years: 10.00     Types: Cigarettes   • Smokeless tobacco: Never Used   Substance and Sexual Activity   • Alcohol use: No   • Drug use: No   • Sexual activity: Defer       Medications:     Current Outpatient Medications:   •  dextromethorphan-quinidine (NUEDEXTA) 20-10 MG capsule capsule, Take 1 capsule by mouth Every 12 (Twelve) Hours., Disp: 60 capsule, Rfl: 11  •  Dimethyl Fumarate (Tecfidera) capsule  delayed-release, Take 1 capsule by mouth 2 (Two) Times a Day., Disp: 60 capsule, Rfl: 11  •  methylPREDNISolone (MEDROL) 4 MG dose pack, Take as directed on package instructions., Disp: 21 tablet, Rfl: 0  •  methylPREDNISolone (MEDROL) 4 MG dose pack, Take as directed on package instructions., Disp: 1 each, Rfl: 0    Allergies:   Allergies   Allergen Reactions   • Codeine Unknown (See Comments)     Pt had reaction in childhood, unsure of reaction       PHQ-9 Total Score:     STEADI Fall Risk Assessment has not been completed.    Objective     Physical Exam:   Physical Exam  Eyes:      Pupils: Pupils are equal, round, and reactive to light.   Neurological:      Mental Status: She is oriented to person, place, and time.      Gait: Gait is intact. Tandem walk normal.      Deep Tendon Reflexes:      Reflex Scores:       Tricep reflexes are 2+ on the right side and 2+ on the left side.       Bicep reflexes are 2+ on the right side and 2+ on the left side.       Brachioradialis reflexes are 2+ on the right side and 2+ on the left side.       Patellar reflexes are 2+ on the right side and 2+ on the left side.       Achilles reflexes are 2+ on the right side and 2+ on the left side.  Psychiatric:         Speech: Speech normal.         Neurologic Exam     Mental Status   Oriented to person, place, and time.   Follows 3 step commands.   Attention: normal. Concentration: normal.   Speech: speech is normal   Level of consciousness: alert  Knowledge: consistent with education.   Normal comprehension.   tearful     Cranial Nerves     CN III, IV, VI   Pupils are equal, round, and reactive to light.  Right pupil: Accommodation: intact.   Left pupil: Accommodation: intact.   CN III: no CN III palsy  CN VI: no CN VI palsy  Nystagmus: none   Diplopia: none  Upgaze: normal  Downgaze: normal  Conjugate gaze: present    CN VII   Facial expression full, symmetric.     CN VIII   Hearing: intact    CN XII   CN XII normal.     Motor Exam  "  Muscle bulk: normal  Overall muscle tone: normal    Strength   Right biceps: 4/5  Left biceps: 4/5  Right triceps: 4/5  Left triceps: 4/5  Right interossei: 5/5  Left interossei: 5/5  Right quadriceps: 5/5  Left quadriceps: 5/5  Right anterior tibial: 5/5  Left anterior tibial: 5/5  Right posterior tibial: 5/5  Left posterior tibial: 5/5    Sensory Exam   Light touch normal.     Gait, Coordination, and Reflexes     Gait  Gait: normal    Coordination   Tandem walking coordination: normal    Tremor   Resting tremor: absent  Action tremor: absent    Reflexes   Right brachioradialis: 2+  Left brachioradialis: 2+  Right biceps: 2+  Left biceps: 2+  Right triceps: 2+  Left triceps: 2+  Right patellar: 2+  Left patellar: 2+  Right achilles: 2+  Left achilles: 2+  Right : 2+  Left : 2+       Vital Signs:   Vitals:    07/05/22 1518   BP: 104/70   Pulse: 70   Temp: 97.3 °F (36.3 °C)   SpO2: 99%   Weight: 73 kg (161 lb)   Height: 157.5 cm (62.01\")     Body mass index is 29.44 kg/m².     Results:   Imaging:   MRI Brain With & Without Contrast    Result Date: 4/15/2022    1.  Multiple foci of abnormal increased T2 signal within the periventricular white matter bilaterally compatible with patient's clinical history of multiple sclerosis. 2.  No pathologic contrast enhancement to suggest active demyelination. 3.  No acute intracranial abnormality.  This report was finalized on 4/15/2022 2:06 PM by Varun Valentino MD.       Scheduled for MRI c-spine in 2 weeks.    Assessment / Plan      Assessment/Plan:   Diagnoses and all orders for this visit:    1. Multiple sclerosis (HCC) (Primary)  -     CBC Auto Differential; Future  -     Comprehensive Metabolic Panel; Future  -     Urinalysis without microscopic (no culture) - Urine, Clean Catch; Future  -     Hepatitis Panel, Acute; Future  -     ASHLEY + PE; Future    2. PBA (pseudobulbar affect)  Comments:  Stopped Neudexta due to non-compliance.    Discussed switching to Ocrevus " or Kesimpta. Information provided. Will need covid vaccines. Labs drawn today. Start forms signed.       Patient Education:     Reviewed medications, potential side effects and signs and symptoms to report. Discussed risk versus benefits of treatment plan with patient and/or family-including medications, labs and radiology that may be ordered. Addressed questions and concerns during visit. Patient and/or family verbalized understanding and agree with plan. Instructed to call the office with any questions and report to ER with any life-threatening symptoms.     Follow Up:   Keep Aug appt with Dr Reyes  During this visit the following were done:  Labs Reviewed [x]    Labs Ordered []    Radiology Reports Reviewed [x]    Radiology Ordered []    PCP Records Reviewed []    Referring Provider Records Reviewed []    ER Records Reviewed []    Hospital Records Reviewed []    History Obtained From Family []    Radiology Images Reviewed [x]    Other Reviewed []    Records Requested []      Chen Shin, DNP, APRN

## 2022-07-06 LAB
ALBUMIN SERPL-MCNC: 4.2 G/DL (ref 3.5–5.2)
ALBUMIN/GLOB SERPL: 1.8 G/DL
ALP SERPL-CCNC: 59 U/L (ref 39–117)
ALT SERPL W P-5'-P-CCNC: 12 U/L (ref 1–33)
ANION GAP SERPL CALCULATED.3IONS-SCNC: 8.2 MMOL/L (ref 5–15)
AST SERPL-CCNC: 21 U/L (ref 1–32)
BASOPHILS # BLD AUTO: 0.04 10*3/MM3 (ref 0–0.2)
BASOPHILS NFR BLD AUTO: 0.5 % (ref 0–1.5)
BILIRUB SERPL-MCNC: 0.2 MG/DL (ref 0–1.2)
BILIRUB UR QL STRIP: NEGATIVE
BUN SERPL-MCNC: 6 MG/DL (ref 6–20)
BUN/CREAT SERPL: 10.2 (ref 7–25)
CALCIUM SPEC-SCNC: 8.8 MG/DL (ref 8.6–10.5)
CHLORIDE SERPL-SCNC: 106 MMOL/L (ref 98–107)
CLARITY UR: CLEAR
CO2 SERPL-SCNC: 24.8 MMOL/L (ref 22–29)
COLOR UR: YELLOW
CREAT SERPL-MCNC: 0.59 MG/DL (ref 0.57–1)
DEPRECATED RDW RBC AUTO: 35.9 FL (ref 37–54)
EGFRCR SERPLBLD CKD-EPI 2021: 122.2 ML/MIN/1.73
EOSINOPHIL # BLD AUTO: 0.17 10*3/MM3 (ref 0–0.4)
EOSINOPHIL NFR BLD AUTO: 2.3 % (ref 0.3–6.2)
ERYTHROCYTE [DISTWIDTH] IN BLOOD BY AUTOMATED COUNT: 11.8 % (ref 12.3–15.4)
GLOBULIN UR ELPH-MCNC: 2.4 GM/DL
GLUCOSE SERPL-MCNC: 86 MG/DL (ref 65–99)
GLUCOSE UR STRIP-MCNC: NEGATIVE MG/DL
HAV IGM SERPL QL IA: NORMAL
HBV CORE IGM SERPL QL IA: NORMAL
HBV SURFACE AG SERPL QL IA: NORMAL
HCT VFR BLD AUTO: 36.4 % (ref 34–46.6)
HCV AB SER DONR QL: NORMAL
HGB BLD-MCNC: 12 G/DL (ref 12–15.9)
HGB UR QL STRIP.AUTO: ABNORMAL
IMM GRANULOCYTES # BLD AUTO: 0.02 10*3/MM3 (ref 0–0.05)
IMM GRANULOCYTES NFR BLD AUTO: 0.3 % (ref 0–0.5)
KETONES UR QL STRIP: NEGATIVE
LEUKOCYTE ESTERASE UR QL STRIP.AUTO: NEGATIVE
LYMPHOCYTES # BLD AUTO: 2.46 10*3/MM3 (ref 0.7–3.1)
LYMPHOCYTES NFR BLD AUTO: 33.5 % (ref 19.6–45.3)
MCH RBC QN AUTO: 27.9 PG (ref 26.6–33)
MCHC RBC AUTO-ENTMCNC: 33 G/DL (ref 31.5–35.7)
MCV RBC AUTO: 84.7 FL (ref 79–97)
MONOCYTES # BLD AUTO: 0.41 10*3/MM3 (ref 0.1–0.9)
MONOCYTES NFR BLD AUTO: 5.6 % (ref 5–12)
NEUTROPHILS NFR BLD AUTO: 4.25 10*3/MM3 (ref 1.7–7)
NEUTROPHILS NFR BLD AUTO: 57.8 % (ref 42.7–76)
NITRITE UR QL STRIP: NEGATIVE
NRBC BLD AUTO-RTO: 0 /100 WBC (ref 0–0.2)
PH UR STRIP.AUTO: 6.5 [PH] (ref 5–8)
PLATELET # BLD AUTO: 219 10*3/MM3 (ref 140–450)
PMV BLD AUTO: 11.6 FL (ref 6–12)
POTASSIUM SERPL-SCNC: 3.8 MMOL/L (ref 3.5–5.2)
PROT SERPL-MCNC: 6.6 G/DL (ref 6–8.5)
PROT UR QL STRIP: ABNORMAL
RBC # BLD AUTO: 4.3 10*6/MM3 (ref 3.77–5.28)
SODIUM SERPL-SCNC: 139 MMOL/L (ref 136–145)
SP GR UR STRIP: 1.03 (ref 1–1.03)
UROBILINOGEN UR QL STRIP: ABNORMAL
WBC NRBC COR # BLD: 7.35 10*3/MM3 (ref 3.4–10.8)

## 2022-07-07 ENCOUNTER — TELEPHONE (OUTPATIENT)
Dept: NEUROLOGY | Facility: CLINIC | Age: 32
End: 2022-07-07

## 2022-07-07 LAB
ALBUMIN SERPL ELPH-MCNC: 3.9 G/DL (ref 2.9–4.4)
ALBUMIN/GLOB SERPL: 1.4 {RATIO} (ref 0.7–1.7)
ALPHA1 GLOB SERPL ELPH-MCNC: 0.2 G/DL (ref 0–0.4)
ALPHA2 GLOB SERPL ELPH-MCNC: 0.6 G/DL (ref 0.4–1)
B-GLOBULIN SERPL ELPH-MCNC: 0.9 G/DL (ref 0.7–1.3)
GAMMA GLOB SERPL ELPH-MCNC: 1.1 G/DL (ref 0.4–1.8)
GLOBULIN SER-MCNC: 2.8 G/DL (ref 2.2–3.9)
IGA SERPL-MCNC: 140 MG/DL (ref 87–352)
IGG SERPL-MCNC: 1080 MG/DL (ref 586–1602)
IGM SERPL-MCNC: 122 MG/DL (ref 26–217)
INTERPRETATION SERPL IEP-IMP: NORMAL
LABORATORY COMMENT REPORT: NORMAL
M PROTEIN SERPL ELPH-MCNC: NORMAL G/DL
PROT SERPL-MCNC: 6.7 G/DL (ref 6–8.5)

## 2022-07-07 NOTE — TELEPHONE ENCOUNTER
Called patient and     ----- Message from Chen Shin DNP, PEGGY sent at 7/7/2022  3:46 PM EDT -----  Please notify pt her labs are stable. She did have blood in her urine. It could be from her menses. If she is having symptoms of UTI she needs to see her PCP.

## 2022-07-08 ENCOUNTER — TELEPHONE (OUTPATIENT)
Dept: NEUROLOGY | Facility: CLINIC | Age: 32
End: 2022-07-08

## 2022-07-08 NOTE — TELEPHONE ENCOUNTER
Called and spoke to pt. She has decided to change to Ocrevus.  She is reluctant to have covid vaccines but will look into getting vaccinated. Labs returned and Hep Panel is neg. Start form signed. Will start authorization.

## 2022-07-11 RX ORDER — DIPHENHYDRAMINE HYDROCHLORIDE 50 MG/ML
50 INJECTION INTRAMUSCULAR; INTRAVENOUS AS NEEDED
Status: CANCELLED | OUTPATIENT
Start: 2022-07-11

## 2022-07-11 RX ORDER — IBUPROFEN 400 MG/1
400 TABLET ORAL EVERY 6 HOURS PRN
Status: CANCELLED | OUTPATIENT
Start: 2022-07-11

## 2022-07-11 RX ORDER — FAMOTIDINE 10 MG/ML
20 INJECTION, SOLUTION INTRAVENOUS AS NEEDED
Status: CANCELLED | OUTPATIENT
Start: 2022-07-11

## 2022-07-11 RX ORDER — MEPERIDINE HYDROCHLORIDE 50 MG/ML
25 INJECTION INTRAMUSCULAR; INTRAVENOUS; SUBCUTANEOUS
Status: CANCELLED | OUTPATIENT
Start: 2022-07-11

## 2022-07-11 RX ORDER — DIPHENHYDRAMINE HYDROCHLORIDE 50 MG/ML
25 INJECTION INTRAMUSCULAR; INTRAVENOUS ONCE
Status: CANCELLED | OUTPATIENT
Start: 2022-07-11

## 2022-07-11 RX ORDER — SODIUM CHLORIDE 9 MG/ML
250 INJECTION, SOLUTION INTRAVENOUS ONCE
Status: CANCELLED | OUTPATIENT
Start: 2022-07-11

## 2022-07-11 RX ORDER — ACETAMINOPHEN 325 MG/1
650 TABLET ORAL ONCE
Status: CANCELLED | OUTPATIENT
Start: 2022-07-11

## 2022-07-11 RX ORDER — ACETAMINOPHEN 325 MG/1
650 TABLET ORAL EVERY 6 HOURS PRN
Status: CANCELLED | OUTPATIENT
Start: 2022-07-11

## 2022-07-12 NOTE — TELEPHONE ENCOUNTER
Caller: LUCAS    Relationship: / [facility]    Best call back number: [phone]    What was the call regarding: LUCAS CALLING IN REGARDS TO OCREVUS PA AS THEY ARE NEEDING ADDITIONAL CLINICAL INFORMATION BEFORE MAKING DETERMINATION. LUCAS TO FAX CLINICAL QUESTIONS TO OFFICE FAX [phone]. LUCAS STATES THAT OFFICE MAY CALL BACK WITH CLINICAL ANSWERS OR FAX QUESTIONS BACK TO FAX # [phone].    Do you require a callback: YES, PLEASE.    PLEASE REVIEW AND ADVISE.

## 2022-07-13 NOTE — TELEPHONE ENCOUNTER
Spoke with Jayshree to explain how patient is having cognitive issues making it hard for her to be compliant with any oral or injectable medication she has to administer at home. She had other questions for me as well. Patient would only be taking Ocrevus once approved and no other DMT. Patients EDSS 1.5 and no relapses but explained that if pt continues to have cognitive issues and not able to make medication it could lead to a relapse. Provided Hep B lab results. Jayshree stated she would send the information provided to the pharmacists and will get back with us once they have looked over information.

## 2022-07-19 ENCOUNTER — HOSPITAL ENCOUNTER (OUTPATIENT)
Dept: MRI IMAGING | Facility: HOSPITAL | Age: 32
Discharge: HOME | End: 2022-07-19
Admitting: PSYCHIATRY & NEUROLOGY

## 2022-07-19 DIAGNOSIS — G35 MULTIPLE SCLEROSIS: ICD-10-CM

## 2022-07-19 PROCEDURE — 72156 MRI NECK SPINE W/O & W/DYE: CPT

## 2022-07-19 PROCEDURE — 0 GADOBENATE DIMEGLUMINE 529 MG/ML SOLUTION: Performed by: PSYCHIATRY & NEUROLOGY

## 2022-07-19 PROCEDURE — 72156 MRI NECK SPINE W/O & W/DYE: CPT | Performed by: RADIOLOGY

## 2022-07-19 PROCEDURE — A9577 INJ MULTIHANCE: HCPCS | Performed by: PSYCHIATRY & NEUROLOGY

## 2022-07-19 RX ADMIN — GADOBENATE DIMEGLUMINE 14 ML: 529 INJECTION, SOLUTION INTRAVENOUS at 09:20

## 2022-07-20 ENCOUNTER — TRANSCRIBE ORDERS (OUTPATIENT)
Dept: ADMINISTRATIVE | Facility: HOSPITAL | Age: 32
End: 2022-07-20

## 2022-07-20 ENCOUNTER — TELEPHONE (OUTPATIENT)
Dept: NEUROLOGY | Facility: CLINIC | Age: 32
End: 2022-07-20

## 2022-07-20 DIAGNOSIS — R10.9 ABDOMINAL PAIN, UNSPECIFIED ABDOMINAL LOCATION: Primary | ICD-10-CM

## 2022-07-20 NOTE — TELEPHONE ENCOUNTER
----- Message from Shaheen Reyes MD sent at 7/19/2022 10:04 AM EDT -----  Michael cross her MRI is normal

## 2022-07-21 ENCOUNTER — HOSPITAL ENCOUNTER (OUTPATIENT)
Dept: ULTRASOUND IMAGING | Facility: HOSPITAL | Age: 32
Discharge: HOME | End: 2022-07-21
Admitting: PHYSICIAN ASSISTANT

## 2022-07-21 DIAGNOSIS — R10.9 ABDOMINAL PAIN, UNSPECIFIED ABDOMINAL LOCATION: ICD-10-CM

## 2022-07-21 PROCEDURE — 76700 US EXAM ABDOM COMPLETE: CPT | Performed by: RADIOLOGY

## 2022-07-21 PROCEDURE — 76700 US EXAM ABDOM COMPLETE: CPT

## 2022-07-28 ENCOUNTER — OFFICE VISIT (OUTPATIENT)
Dept: SURGERY | Facility: CLINIC | Age: 32
End: 2022-07-28

## 2022-07-28 ENCOUNTER — TELEPHONE (OUTPATIENT)
Dept: SURGERY | Facility: CLINIC | Age: 32
End: 2022-07-28

## 2022-07-28 VITALS
HEART RATE: 80 BPM | HEIGHT: 62 IN | SYSTOLIC BLOOD PRESSURE: 102 MMHG | WEIGHT: 162 LBS | DIASTOLIC BLOOD PRESSURE: 70 MMHG | BODY MASS INDEX: 29.81 KG/M2

## 2022-07-28 DIAGNOSIS — Z20.822 ENCOUNTER FOR PREPROCEDURE SCREENING LABORATORY TESTING FOR COVID-19: Primary | ICD-10-CM

## 2022-07-28 DIAGNOSIS — K80.20 CALCULUS OF GALLBLADDER WITHOUT CHOLECYSTITIS WITHOUT OBSTRUCTION: Primary | ICD-10-CM

## 2022-07-28 DIAGNOSIS — Z01.812 ENCOUNTER FOR PREPROCEDURE SCREENING LABORATORY TESTING FOR COVID-19: Primary | ICD-10-CM

## 2022-07-28 PROCEDURE — 99214 OFFICE O/P EST MOD 30 MIN: CPT

## 2022-07-28 RX ORDER — SODIUM CHLORIDE 0.9 % (FLUSH) 0.9 %
3 SYRINGE (ML) INJECTION EVERY 12 HOURS SCHEDULED
Status: CANCELLED | OUTPATIENT
Start: 2022-07-28

## 2022-07-28 RX ORDER — ACETAMINOPHEN 325 MG/1
650 TABLET ORAL ONCE
Status: CANCELLED | OUTPATIENT
Start: 2022-07-28 | End: 2022-07-28

## 2022-07-28 RX ORDER — SODIUM CHLORIDE 0.9 % (FLUSH) 0.9 %
10 SYRINGE (ML) INJECTION AS NEEDED
Status: CANCELLED | OUTPATIENT
Start: 2022-08-29

## 2022-07-28 RX ORDER — ACETAMINOPHEN 325 MG/1
650 TABLET ORAL ONCE
Status: CANCELLED | OUTPATIENT
Start: 2022-08-29 | End: 2022-07-28

## 2022-07-28 RX ORDER — SODIUM CHLORIDE 0.9 % (FLUSH) 0.9 %
3 SYRINGE (ML) INJECTION EVERY 12 HOURS SCHEDULED
Status: CANCELLED | OUTPATIENT
Start: 2022-08-29

## 2022-07-28 RX ORDER — SODIUM CHLORIDE 0.9 % (FLUSH) 0.9 %
10 SYRINGE (ML) INJECTION AS NEEDED
Status: CANCELLED | OUTPATIENT
Start: 2022-07-28

## 2022-07-28 NOTE — TELEPHONE ENCOUNTER
Talked with patient about preop time, advised patient that the [place] office would be calling her to give surgery information, patient voiced understanding.

## 2022-07-28 NOTE — PROGRESS NOTES
Subjective   Avani Rm is a 31 y.o. female who presents today for Initial Evaluation    Chief Complaint:    Chief Complaint   Patient presents with   • Cholelithiasis        History of Present Illness:    History of Present Illness Avani is a 31-year-old female who presents with complaints of epigastric and right upper quadrant pain.  She had an ultrasound performed that revealed gallstones.  She states that she has had pain after eating, especially greasy meals, for approximately 6 months.  She denies any past abdominal surgeries.  She does not take any blood thinning medications.  States that she has eliminated processed foods out of her diet to decrease pain.    The following portions of the patient's history were reviewed and updated as appropriate: allergies, current medications, past family history, past medical history, past social history, past surgical history and problem list.    Past Medical History:  Past Medical History:   Diagnosis Date   • Fracture, radius and ulna, proximal     right   • Multiple sclerosis (HCC) 10/5/2020   • PBA (pseudobulbar affect) 8/10/2021       Social History:  Social History     Socioeconomic History   • Marital status:    Tobacco Use   • Smoking status: Current Every Day Smoker     Packs/day: 1.00     Years: 10.00     Pack years: 10.00     Types: Cigarettes   • Smokeless tobacco: Never Used   Vaping Use   • Vaping Use: Some days   • Substances: Nicotine   • Devices: Refillable tank   Substance and Sexual Activity   • Alcohol use: No   • Drug use: No   • Sexual activity: Defer       Family History:  Family History   Problem Relation Age of Onset   • No Known Problems Mother    • No Known Problems Father    • No Known Problems Sister    • No Known Problems Brother    • No Known Problems Maternal Grandmother    • No Known Problems Maternal Grandfather    • No Known Problems Paternal Grandmother    • Cancer Paternal Grandfather    • No Known Problems Maternal Aunt     • No Known Problems Maternal Uncle    • No Known Problems Paternal Aunt    • No Known Problems Paternal Uncle    • No Known Problems Other    • Ataxia Neg Hx    • Chorea Neg Hx    • Dementia Neg Hx    • Mental retardation Neg Hx    • Migraines Neg Hx    • Multiple sclerosis Neg Hx    • Neurofibromatosis Neg Hx    • Neuropathy Neg Hx    • Parkinsonism Neg Hx    • Seizures Neg Hx    • Stroke Neg Hx        Past Surgical History:  Past Surgical History:   Procedure Laterality Date   •  SECTION      x 3   •  SECTION WITH TUBAL     • ORIF ULNA/RADIUS FRACTURES Right 3/27/2018    Procedure: ULNA/RADIUS OPEN REDUCTION INTERNAL FIXATION;  Surgeon: Benja Keene MD;  Location: [facility];  Service: Orthopedics   • SALPINGECTOMY     • TONSILLECTOMY     • WISDOM TOOTH EXTRACTION         Problem List:  Patient Active Problem List   Diagnosis   • Closed fracture of right radius and ulna   • Numbness and tingling   • MRI of brain abnormal   • Multiple sclerosis (HCC)   • Concentration deficit   • PBA (pseudobulbar affect)       Allergy:   Allergies   Allergen Reactions   • Codeine Unknown (See Comments)     Pt had reaction in childhood, unsure of reaction        Current Medications:   Current Outpatient Medications   Medication Sig Dispense Refill   • dextromethorphan-quinidine (NUEDEXTA) 20-10 MG capsule capsule Take 1 capsule by mouth Every 12 (Twelve) Hours. 60 capsule 11   • Dimethyl Fumarate (Tecfidera) capsule delayed-release Take 1 capsule by mouth 2 (Two) Times a Day. 60 capsule 11   • methylPREDNISolone (MEDROL) 4 MG dose pack Take as directed on package instructions. 21 tablet 0   • methylPREDNISolone (MEDROL) 4 MG dose pack Take as directed on package instructions. 1 each 0     No current facility-administered medications for this visit.       Review of Systems:    Review of Systems   Constitutional: Negative for activity change.   HENT: Negative for congestion.    Eyes: Negative for  "blurred vision.   Respiratory: Negative for shortness of breath.    Cardiovascular: Negative for chest pain.   Gastrointestinal: Positive for abdominal pain.   Endocrine: Negative for cold intolerance.   Genitourinary: Negative for flank pain.   Musculoskeletal: Negative for back pain.   Skin: Negative for bruise.   Allergic/Immunologic: Negative for immunocompromised state.   Neurological: Negative for confusion.   Hematological: Negative for adenopathy.   Psychiatric/Behavioral: Negative for agitation.         Physical Exam:   Physical Exam  Constitutional:       Appearance: Normal appearance.   HENT:      Head: Normocephalic and atraumatic.      Right Ear: External ear normal.      Left Ear: External ear normal.   Eyes:      Extraocular Movements: Extraocular movements intact.      Pupils: Pupils are equal, round, and reactive to light.   Cardiovascular:      Rate and Rhythm: Normal rate.      Pulses: Normal pulses.   Pulmonary:      Effort: Pulmonary effort is normal.   Abdominal:      General: Abdomen is flat. There is no distension.      Palpations: Abdomen is soft.   Musculoskeletal:         General: Normal range of motion.      Cervical back: Normal range of motion and neck supple.   Skin:     General: Skin is warm and dry.      Capillary Refill: Capillary refill takes less than 2 seconds.   Neurological:      General: No focal deficit present.      Mental Status: She is alert and oriented to person, place, and time.   Psychiatric:         Mood and Affect: Mood normal.         Behavior: Behavior normal.         Vitals:  Blood pressure 102/70, pulse 80, height 157.5 cm (62.01\"), weight 73.5 kg (162 lb), not currently breastfeeding.   Body mass index is 29.62 kg/m².     Imaging:   US Abdomen Complete  Narrative: ULTRASOUND ABDOMEN COMPLETE-      CLINICAL INDICATION:     R10.9; R10.9-Unspecified abdominal pain.     TECHNIQUE: Multiplanar gray scale ultrasound of the abdomen.      COMPARISON: None.     FINDINGS: "   Visualized pancreas is unremarkable.  The imaged portion of the abdominal aorta is nondilated.   The liver is homogeneous. There is no intrahepatic ductal dilatation or  focal hepatic mass.  The imaged portion of the hepatic vessels and inferior vena cava are  patent.  The gallbladder shows cholelithiasis, but no pericholecystic fluid. The  wall of the gallbladder is not thickened. It measures 1.24 mm.  The common bile duct is normal, measuring 2.31 mm.  The kidneys demonstrate no evidence of hydronephrosis or solid renal  mass.  The spleen is homogeneous and measures  9.73 cm.     Impression: Cholelithiasis.        This report was finalized on 7/21/2022 5:15 PM by Dr. Cachorro Brewer MD.        Reviewed imaging     Assessment & Plan   Diagnoses and all orders for this visit:    1. Calculus of gallbladder without cholecystitis without obstruction (Primary)  -     Cancel: Case Request; Standing  -     Cancel: COVID PRE-OP / PRE-PROCEDURE SCREENING ORDER (NO ISOLATION) - Swab, Nasopharynx; Future  -     Cancel: CBC & Differential; Future  -     Cancel: Comprehensive Metabolic Panel; Future  -     Cancel: Case Request  -     Case Request; Standing  -     COVID PRE-OP / PRE-PROCEDURE SCREENING ORDER (NO ISOLATION) - Swab, Nasopharynx; Future  -     CBC & Differential; Future  -     Comprehensive Metabolic Panel; Future  -     Case Request    Other orders  -     Follow Anesthesia Guidelines / Standing Orders; Future  -     Obtain Informed Consent; Future  -     Provide NPO Instructions to Patient; Future  -     Cancel: Chlorhexidine Skin Prep; Future  -     Follow Anesthesia Guidelines / Standing Orders; Future  -     Obtain Informed Consent; Future  -     Provide NPO Instructions to Patient; Future  -     Chlorhexidine Skin Prep; Future      Avani is a 31-year-old female who presents with cholelithiasis.  Patient will undergo cholecystectomy with Dr. Castro.  Patient verbalized understanding of procedure and wishes  to proceed.      Visit Diagnoses:    ICD-10-CM ICD-9-CM   1. Calculus of gallbladder without cholecystitis without obstruction  K80.20 574.20         MEDS ORDERED DURING VISIT:  No orders of the defined types were placed in this encounter.      Return for Follow-up postop.             This document has been electronically signed by PEGGY Lawson  July 28, 2022 14:23 EDT    Please note that portions of this note were completed with a voice recognition program.

## 2022-08-05 ENCOUNTER — PRE-ADMISSION TESTING (OUTPATIENT)
Dept: PREADMISSION TESTING | Facility: HOSPITAL | Age: 32
End: 2022-08-05

## 2022-08-05 ENCOUNTER — LAB (OUTPATIENT)
Dept: LAB | Facility: HOSPITAL | Age: 32
End: 2022-08-05

## 2022-08-05 ENCOUNTER — TELEPHONE (OUTPATIENT)
Dept: SURGERY | Facility: CLINIC | Age: 32
End: 2022-08-05

## 2022-08-05 VITALS — BODY MASS INDEX: 29.44 KG/M2 | HEIGHT: 62 IN | WEIGHT: 160 LBS

## 2022-08-05 DIAGNOSIS — Z20.822 ENCOUNTER FOR PREPROCEDURE SCREENING LABORATORY TESTING FOR COVID-19: ICD-10-CM

## 2022-08-05 DIAGNOSIS — K80.20 CALCULUS OF GALLBLADDER WITHOUT CHOLECYSTITIS WITHOUT OBSTRUCTION: ICD-10-CM

## 2022-08-05 DIAGNOSIS — Z01.812 ENCOUNTER FOR PREPROCEDURE SCREENING LABORATORY TESTING FOR COVID-19: ICD-10-CM

## 2022-08-05 LAB
ALBUMIN SERPL-MCNC: 4.56 G/DL (ref 3.5–5.2)
ALBUMIN/GLOB SERPL: 1.9 G/DL
ALP SERPL-CCNC: 81 U/L (ref 39–117)
ALT SERPL W P-5'-P-CCNC: 18 U/L (ref 1–33)
ANION GAP SERPL CALCULATED.3IONS-SCNC: 9.2 MMOL/L (ref 5–15)
AST SERPL-CCNC: 25 U/L (ref 1–32)
BASOPHILS # BLD AUTO: 0.02 10*3/MM3 (ref 0–0.2)
BASOPHILS NFR BLD AUTO: 0.4 % (ref 0–1.5)
BILIRUB SERPL-MCNC: 0.3 MG/DL (ref 0–1.2)
BUN SERPL-MCNC: 5 MG/DL (ref 6–20)
BUN/CREAT SERPL: 8.3 (ref 7–25)
CALCIUM SPEC-SCNC: 9 MG/DL (ref 8.6–10.5)
CHLORIDE SERPL-SCNC: 104 MMOL/L (ref 98–107)
CO2 SERPL-SCNC: 23.8 MMOL/L (ref 22–29)
CREAT SERPL-MCNC: 0.6 MG/DL (ref 0.57–1)
DEPRECATED RDW RBC AUTO: 39.2 FL (ref 37–54)
EGFRCR SERPLBLD CKD-EPI 2021: 121.7 ML/MIN/1.73
EOSINOPHIL # BLD AUTO: 0.07 10*3/MM3 (ref 0–0.4)
EOSINOPHIL NFR BLD AUTO: 1.5 % (ref 0.3–6.2)
ERYTHROCYTE [DISTWIDTH] IN BLOOD BY AUTOMATED COUNT: 12.5 % (ref 12.3–15.4)
GLOBULIN UR ELPH-MCNC: 2.3 GM/DL
GLUCOSE SERPL-MCNC: 89 MG/DL (ref 65–99)
HCT VFR BLD AUTO: 38.1 % (ref 34–46.6)
HGB BLD-MCNC: 12.2 G/DL (ref 12–15.9)
IMM GRANULOCYTES # BLD AUTO: 0.03 10*3/MM3 (ref 0–0.05)
IMM GRANULOCYTES NFR BLD AUTO: 0.6 % (ref 0–0.5)
LYMPHOCYTES # BLD AUTO: 1.46 10*3/MM3 (ref 0.7–3.1)
LYMPHOCYTES NFR BLD AUTO: 30.5 % (ref 19.6–45.3)
MCH RBC QN AUTO: 27.4 PG (ref 26.6–33)
MCHC RBC AUTO-ENTMCNC: 32 G/DL (ref 31.5–35.7)
MCV RBC AUTO: 85.4 FL (ref 79–97)
MONOCYTES # BLD AUTO: 0.5 10*3/MM3 (ref 0.1–0.9)
MONOCYTES NFR BLD AUTO: 10.5 % (ref 5–12)
NEUTROPHILS NFR BLD AUTO: 2.7 10*3/MM3 (ref 1.7–7)
NEUTROPHILS NFR BLD AUTO: 56.5 % (ref 42.7–76)
NRBC BLD AUTO-RTO: 0 /100 WBC (ref 0–0.2)
PLATELET # BLD AUTO: 192 10*3/MM3 (ref 140–450)
PMV BLD AUTO: 11.7 FL (ref 6–12)
POTASSIUM SERPL-SCNC: 3.9 MMOL/L (ref 3.5–5.2)
PROT SERPL-MCNC: 6.9 G/DL (ref 6–8.5)
RBC # BLD AUTO: 4.46 10*6/MM3 (ref 3.77–5.28)
SARS-COV-2 RNA RESP QL NAA+PROBE: DETECTED
SODIUM SERPL-SCNC: 137 MMOL/L (ref 136–145)
WBC NRBC COR # BLD: 4.78 10*3/MM3 (ref 3.4–10.8)

## 2022-08-05 PROCEDURE — 36415 COLL VENOUS BLD VENIPUNCTURE: CPT

## 2022-08-05 PROCEDURE — C9803 HOPD COVID-19 SPEC COLLECT: HCPCS

## 2022-08-05 PROCEDURE — 80053 COMPREHEN METABOLIC PANEL: CPT

## 2022-08-05 PROCEDURE — U0003 INFECTIOUS AGENT DETECTION BY NUCLEIC ACID (DNA OR RNA); SEVERE ACUTE RESPIRATORY SYNDROME CORONAVIRUS 2 (SARS-COV-2) (CORONAVIRUS DISEASE [COVID-19]), AMPLIFIED PROBE TECHNIQUE, MAKING USE OF HIGH THROUGHPUT TECHNOLOGIES AS DESCRIBED BY CMS-2020-01-R: HCPCS

## 2022-08-05 PROCEDURE — 85025 COMPLETE CBC W/AUTO DIFF WBC: CPT

## 2022-08-05 NOTE — TELEPHONE ENCOUNTER
SPOKE WITH PATIENT CONFIRMING ARRIVAL TIME FOR [facility]RA[facility] SURGERY ON Monday 08/08/22 TO ARRIVE AT [facility] AT 9:30AM, BE SURE TO HAVE ADULT  TO DRIVE HOME AFTER PROCEDURE.    PATIENT VERBALIZED UNDERSTANDING AND AGREEMENT TO ARRIVE ON TIME FOR PROCEDURE AND INSTRUCTED TO CONTACT CLINIC AS SOON AS POSSIBLE IF NEED TO CANCEL OR RESCHEDULE THIS SURGERY.   Ambulatory

## 2022-08-05 NOTE — DISCHARGE INSTRUCTIONS
TAKE the following medications the morning of surgery:  8/8/2022  Arrival time per MD     All heart or blood pressure medications    Please discontinue all blood thinners and anticoagulants (except aspirin) prior to surgery as per your surgeon and cardiologist instructions.  Aspirin may be continued up to the day prior to surgery.    HOLD all diabetic medications the morning of surgery as order by physician.    Please follow instructions on use of prep cloths provided by nurse. Return instruction sheet to pre-op nurse on day of surgery.    Arrival time for surgery on   will be given to you by Dr. Merida.    A RESPONSIBLE PERSON MUST REMAIN IN THE WAITING ROOM DURING YOUR PROCEDURE AND A RESPONSIBLE  MUST BE AVAILABLE UPON YOUR DISCHARGE.    General Instructions:  8/7/2022  Do NOT eat or drink after midnight which includes water, mints, or gum.  You may brush your teeth. Dental appliances that are removable must be taken out day of surgery.  Do NOT smoke, chew tobacco, or drink alcohol within 24 hours prior to surgery.  Bring medications in original bottles, any inhalers and if applicable your C-PAP/BI-PAP machine  Bring any papers given to you in the doctor’s office  Wear clean, comfortable clothes and socks  Do NOT wear contact lenses or make-up or dark nail polish.  Bring a case for your glasses if applicable.  Bring crutches or walker if applicable  Leave all other valuables and jewelry at home  If you were given a blood bank armband, continue to wear it until discharged.    Preventing a Surgical Site Infection:  Shower the night before surgery (unless instructed otherwise) using a fresh bar of anti-bacterial soap (such as Dial) and clean washcloth.  Dry with a clean towel and dress in clean clothing.  For 2 to 3 days before surgery, avoid shaving with a razor near where you will have surgery because the razor can irritate skin and make it easier to develop an infection.  Ask your surgeon if you will be  receiving antibiotics prior to surgery.  Make sure you, your family, and all healthcare providers clean their hands with soap and water or an alcohol-based hand  before caring for you or your wound.  If at all possible, quit smoking as many days before surgery as you can.    Day of Surgery:  Upon arrival, a pre-op nurse and anesthesiologist will review your health history, obtain vital signs, and answer questions you may have.  The only belongings needed at this time will be your home medications and if applicable you C-PAP/BI-PAP machine.  If you are staying overnight, your family can leave the rest of your belongings in the car and bring them to your room later.  A pre-op nurse will start an IV and you may receive medication in preparation for surgery.  Due to patient privacy and limited space, only one member of your family will be able to accompany you in the pre-op area.  While you are in surgery your family should notify the waiting room  if they leave the waiting room area and provide a contact number.  Please be aware that surgery does come with discomfort.  We want to make every effort to control your discomfort so please discuss any uncontrolled symptoms with your nurse.  Your doctor will most likely have prescribed pain medications.  If you are going home after surgery you will receive individualized written care instructions before being discharged.  A responsible adult must drive you to and from the hospital on the day of surgery and stay with you for 24 hours.  If you are staying overnight following surgery, you will be transported to your hospital room following the recovery period.

## 2022-08-06 ENCOUNTER — TELEPHONE (OUTPATIENT)
Dept: SURGERY | Facility: CLINIC | Age: 32
End: 2022-08-06

## 2022-08-06 NOTE — TELEPHONE ENCOUNTER
Called to notify patient of her positive COVID swab. She reports feeling only a sore throat.   Clinic will call Monday to reschedule her cholecystectomy

## 2022-08-12 PROBLEM — R90.89 MRI OF BRAIN ABNORMAL: Status: RESOLVED | Noted: 2020-09-21 | Resolved: 2022-08-12

## 2022-08-24 ENCOUNTER — PRE-ADMISSION TESTING (OUTPATIENT)
Dept: PREADMISSION TESTING | Facility: HOSPITAL | Age: 32
End: 2022-08-24

## 2022-08-29 ENCOUNTER — APPOINTMENT (OUTPATIENT)
Dept: GENERAL RADIOLOGY | Facility: HOSPITAL | Age: 32
End: 2022-08-29
Payer: OTHER GOVERNMENT

## 2022-08-29 ENCOUNTER — HOSPITAL ENCOUNTER (OUTPATIENT)
Facility: HOSPITAL | Age: 32
Setting detail: HOSPITAL OUTPATIENT SURGERY
Discharge: HOME | End: 2022-08-29
Attending: SURGERY | Admitting: SURGERY
Payer: OTHER GOVERNMENT

## 2022-08-29 ENCOUNTER — ANESTHESIA EVENT (OUTPATIENT)
Dept: PERIOP | Facility: HOSPITAL | Age: 32
End: 2022-08-29
Payer: OTHER GOVERNMENT

## 2022-08-29 ENCOUNTER — ANESTHESIA (OUTPATIENT)
Dept: PERIOP | Facility: HOSPITAL | Age: 32
End: 2022-08-29
Payer: OTHER GOVERNMENT

## 2022-08-29 VITALS
HEART RATE: 62 BPM | TEMPERATURE: 97.6 F | DIASTOLIC BLOOD PRESSURE: 64 MMHG | OXYGEN SATURATION: 99 % | SYSTOLIC BLOOD PRESSURE: 110 MMHG | WEIGHT: 157.8 LBS | RESPIRATION RATE: 19 BRPM | BODY MASS INDEX: 29.04 KG/M2 | HEIGHT: 62 IN

## 2022-08-29 DIAGNOSIS — K80.20 CALCULUS OF GALLBLADDER WITHOUT CHOLECYSTITIS WITHOUT OBSTRUCTION: Primary | ICD-10-CM

## 2022-08-29 LAB
B-HCG UR QL: NEGATIVE
EXPIRATION DATE: NORMAL
INTERNAL NEGATIVE CONTROL: NEGATIVE
INTERNAL POSITIVE CONTROL: POSITIVE
Lab: NORMAL

## 2022-08-29 PROCEDURE — 25010000002 NEOSTIGMINE 10 MG/10ML SOLUTION: Performed by: NURSE ANESTHETIST, CERTIFIED REGISTERED

## 2022-08-29 PROCEDURE — 25010000002 ONDANSETRON PER 1 MG: Performed by: NURSE ANESTHETIST, CERTIFIED REGISTERED

## 2022-08-29 PROCEDURE — 47562 LAPAROSCOPIC CHOLECYSTECTOMY: CPT | Performed by: SURGERY

## 2022-08-29 PROCEDURE — 25010000002 ROPIVACAINE PER 1 MG: Performed by: ANESTHESIOLOGY

## 2022-08-29 PROCEDURE — 88304 TISSUE EXAM BY PATHOLOGIST: CPT

## 2022-08-29 PROCEDURE — 25010000002 KETOROLAC TROMETHAMINE PER 15 MG: Performed by: NURSE ANESTHETIST, CERTIFIED REGISTERED

## 2022-08-29 PROCEDURE — S2900 ROBOTIC SURGICAL SYSTEM: HCPCS | Performed by: SURGERY

## 2022-08-29 PROCEDURE — 25010000002 PROPOFOL 10 MG/ML EMULSION: Performed by: NURSE ANESTHETIST, CERTIFIED REGISTERED

## 2022-08-29 PROCEDURE — 25010000002 FENTANYL CITRATE (PF) 50 MCG/ML SOLUTION: Performed by: NURSE ANESTHETIST, CERTIFIED REGISTERED

## 2022-08-29 PROCEDURE — S0260 H&P FOR SURGERY: HCPCS | Performed by: SURGERY

## 2022-08-29 PROCEDURE — 81025 URINE PREGNANCY TEST: CPT | Performed by: ANESTHESIOLOGY

## 2022-08-29 PROCEDURE — 25010000002 MIDAZOLAM PER 1 MG: Performed by: NURSE ANESTHETIST, CERTIFIED REGISTERED

## 2022-08-29 PROCEDURE — 25010000002 AMPICILLIN-SULBACTAM PER 1.5 G

## 2022-08-29 DEVICE — ARISTA AH ABSORBABLE HEMOSTATIC PARTICLES
Type: IMPLANTABLE DEVICE | Site: ABDOMEN | Status: FUNCTIONAL
Brand: ARISTA™ AH

## 2022-08-29 DEVICE — HEMOLOK L 6 CLIPS/CART
Type: IMPLANTABLE DEVICE | Site: ABDOMEN | Status: FUNCTIONAL
Brand: WECK

## 2022-08-29 RX ORDER — FAMOTIDINE 10 MG/ML
INJECTION, SOLUTION INTRAVENOUS AS NEEDED
Status: DISCONTINUED | OUTPATIENT
Start: 2022-08-29 | End: 2022-08-29 | Stop reason: SURG

## 2022-08-29 RX ORDER — ACETAMINOPHEN 500 MG
1000 TABLET ORAL EVERY 6 HOURS
Qty: 40 TABLET | Refills: 0 | Status: SHIPPED | OUTPATIENT
Start: 2022-08-29 | End: 2022-09-03

## 2022-08-29 RX ORDER — SODIUM CHLORIDE 0.9 % (FLUSH) 0.9 %
3 SYRINGE (ML) INJECTION EVERY 12 HOURS SCHEDULED
Status: DISCONTINUED | OUTPATIENT
Start: 2022-08-29 | End: 2022-08-29 | Stop reason: HOSPADM

## 2022-08-29 RX ORDER — TRAMADOL HYDROCHLORIDE 50 MG/1
50 TABLET ORAL EVERY 6 HOURS PRN
Qty: 12 TABLET | Refills: 0 | Status: SHIPPED | OUTPATIENT
Start: 2022-08-29 | End: 2022-09-15

## 2022-08-29 RX ORDER — SODIUM CHLORIDE 0.9 % (FLUSH) 0.9 %
10 SYRINGE (ML) INJECTION AS NEEDED
Status: DISCONTINUED | OUTPATIENT
Start: 2022-08-29 | End: 2022-08-29 | Stop reason: HOSPADM

## 2022-08-29 RX ORDER — SODIUM CHLORIDE, SODIUM LACTATE, POTASSIUM CHLORIDE, CALCIUM CHLORIDE 600; 310; 30; 20 MG/100ML; MG/100ML; MG/100ML; MG/100ML
125 INJECTION, SOLUTION INTRAVENOUS ONCE
Status: COMPLETED | OUTPATIENT
Start: 2022-08-29 | End: 2022-08-29

## 2022-08-29 RX ORDER — SODIUM CHLORIDE, SODIUM LACTATE, POTASSIUM CHLORIDE, CALCIUM CHLORIDE 600; 310; 30; 20 MG/100ML; MG/100ML; MG/100ML; MG/100ML
100 INJECTION, SOLUTION INTRAVENOUS ONCE AS NEEDED
Status: DISCONTINUED | OUTPATIENT
Start: 2022-08-29 | End: 2022-08-29 | Stop reason: HOSPADM

## 2022-08-29 RX ORDER — MAGNESIUM HYDROXIDE 1200 MG/15ML
LIQUID ORAL AS NEEDED
Status: DISCONTINUED | OUTPATIENT
Start: 2022-08-29 | End: 2022-08-29 | Stop reason: HOSPADM

## 2022-08-29 RX ORDER — MIDAZOLAM HYDROCHLORIDE 1 MG/ML
INJECTION INTRAMUSCULAR; INTRAVENOUS AS NEEDED
Status: DISCONTINUED | OUTPATIENT
Start: 2022-08-29 | End: 2022-08-29 | Stop reason: SURG

## 2022-08-29 RX ORDER — INDOCYANINE GREEN AND WATER 25 MG
2.5 KIT INJECTION ONCE
Status: COMPLETED | OUTPATIENT
Start: 2022-08-29 | End: 2022-08-29

## 2022-08-29 RX ORDER — SIMETHICONE 20 MG/.3ML
40 EMULSION ORAL ONCE
Status: COMPLETED | OUTPATIENT
Start: 2022-08-29 | End: 2022-08-29

## 2022-08-29 RX ORDER — NEOSTIGMINE METHYLSULFATE 1 MG/ML
INJECTION, SOLUTION INTRAVENOUS AS NEEDED
Status: DISCONTINUED | OUTPATIENT
Start: 2022-08-29 | End: 2022-08-29 | Stop reason: SURG

## 2022-08-29 RX ORDER — MIDAZOLAM HYDROCHLORIDE 1 MG/ML
1 INJECTION INTRAMUSCULAR; INTRAVENOUS
Status: DISCONTINUED | OUTPATIENT
Start: 2022-08-29 | End: 2022-08-29 | Stop reason: HOSPADM

## 2022-08-29 RX ORDER — FENTANYL CITRATE 50 UG/ML
INJECTION, SOLUTION INTRAMUSCULAR; INTRAVENOUS AS NEEDED
Status: DISCONTINUED | OUTPATIENT
Start: 2022-08-29 | End: 2022-08-29 | Stop reason: SURG

## 2022-08-29 RX ORDER — SODIUM CHLORIDE, SODIUM LACTATE, POTASSIUM CHLORIDE, CALCIUM CHLORIDE 600; 310; 30; 20 MG/100ML; MG/100ML; MG/100ML; MG/100ML
INJECTION, SOLUTION INTRAVENOUS CONTINUOUS PRN
Status: DISCONTINUED | OUTPATIENT
Start: 2022-08-29 | End: 2022-08-29 | Stop reason: SURG

## 2022-08-29 RX ORDER — ONDANSETRON 2 MG/ML
4 INJECTION INTRAMUSCULAR; INTRAVENOUS AS NEEDED
Status: DISCONTINUED | OUTPATIENT
Start: 2022-08-29 | End: 2022-08-29 | Stop reason: HOSPADM

## 2022-08-29 RX ORDER — KETOROLAC TROMETHAMINE 30 MG/ML
30 INJECTION, SOLUTION INTRAMUSCULAR; INTRAVENOUS EVERY 6 HOURS PRN
Status: COMPLETED | OUTPATIENT
Start: 2022-08-29 | End: 2022-08-29

## 2022-08-29 RX ORDER — ONDANSETRON 2 MG/ML
INJECTION INTRAMUSCULAR; INTRAVENOUS AS NEEDED
Status: DISCONTINUED | OUTPATIENT
Start: 2022-08-29 | End: 2022-08-29 | Stop reason: SURG

## 2022-08-29 RX ORDER — ROCURONIUM BROMIDE 10 MG/ML
INJECTION, SOLUTION INTRAVENOUS AS NEEDED
Status: DISCONTINUED | OUTPATIENT
Start: 2022-08-29 | End: 2022-08-29 | Stop reason: SURG

## 2022-08-29 RX ORDER — ACETAMINOPHEN 325 MG/1
650 TABLET ORAL ONCE
Status: COMPLETED | OUTPATIENT
Start: 2022-08-29 | End: 2022-08-29

## 2022-08-29 RX ORDER — FENTANYL CITRATE 50 UG/ML
50 INJECTION, SOLUTION INTRAMUSCULAR; INTRAVENOUS
Status: DISCONTINUED | OUTPATIENT
Start: 2022-08-29 | End: 2022-08-29 | Stop reason: HOSPADM

## 2022-08-29 RX ORDER — TRAMADOL HYDROCHLORIDE 50 MG/1
50 TABLET ORAL EVERY 6 HOURS PRN
Qty: 12 TABLET | Refills: 0 | Status: SHIPPED | OUTPATIENT
Start: 2022-08-29 | End: 2022-08-29 | Stop reason: HOSPADM

## 2022-08-29 RX ORDER — ROPIVACAINE HYDROCHLORIDE 5 MG/ML
INJECTION, SOLUTION EPIDURAL; INFILTRATION; PERINEURAL
Status: COMPLETED | OUTPATIENT
Start: 2022-08-29 | End: 2022-08-29

## 2022-08-29 RX ORDER — IBUPROFEN 600 MG/1
600 TABLET ORAL EVERY 6 HOURS PRN
Qty: 20 TABLET | Refills: 0 | Status: SHIPPED | OUTPATIENT
Start: 2022-08-29 | End: 2023-03-21

## 2022-08-29 RX ORDER — GLYCOPYRROLATE 0.2 MG/ML
INJECTION INTRAMUSCULAR; INTRAVENOUS AS NEEDED
Status: DISCONTINUED | OUTPATIENT
Start: 2022-08-29 | End: 2022-08-29 | Stop reason: SURG

## 2022-08-29 RX ORDER — SODIUM CHLORIDE 0.9 % (FLUSH) 0.9 %
10 SYRINGE (ML) INJECTION EVERY 12 HOURS SCHEDULED
Status: DISCONTINUED | OUTPATIENT
Start: 2022-08-29 | End: 2022-08-29 | Stop reason: HOSPADM

## 2022-08-29 RX ORDER — MEPERIDINE HYDROCHLORIDE 25 MG/ML
12.5 INJECTION INTRAMUSCULAR; INTRAVENOUS; SUBCUTANEOUS
Status: DISCONTINUED | OUTPATIENT
Start: 2022-08-29 | End: 2022-08-29 | Stop reason: HOSPADM

## 2022-08-29 RX ORDER — OXYCODONE HYDROCHLORIDE AND ACETAMINOPHEN 5; 325 MG/1; MG/1
1 TABLET ORAL ONCE AS NEEDED
Status: COMPLETED | OUTPATIENT
Start: 2022-08-29 | End: 2022-08-29

## 2022-08-29 RX ORDER — PROPOFOL 10 MG/ML
VIAL (ML) INTRAVENOUS AS NEEDED
Status: DISCONTINUED | OUTPATIENT
Start: 2022-08-29 | End: 2022-08-29 | Stop reason: SURG

## 2022-08-29 RX ORDER — IPRATROPIUM BROMIDE AND ALBUTEROL SULFATE 2.5; .5 MG/3ML; MG/3ML
3 SOLUTION RESPIRATORY (INHALATION) ONCE AS NEEDED
Status: DISCONTINUED | OUTPATIENT
Start: 2022-08-29 | End: 2022-08-29 | Stop reason: HOSPADM

## 2022-08-29 RX ADMIN — GLYCOPYRROLATE 0.4 MG: 0.2 INJECTION INTRAMUSCULAR; INTRAVENOUS at 11:29

## 2022-08-29 RX ADMIN — ROPIVACAINE HYDROCHLORIDE 250 MG: 5 INJECTION, SOLUTION EPIDURAL; INFILTRATION; PERINEURAL at 10:28

## 2022-08-29 RX ADMIN — AMPICILLIN SODIUM AND SULBACTAM SODIUM 3 G: 2; 1 INJECTION, POWDER, FOR SOLUTION INTRAMUSCULAR; INTRAVENOUS at 10:28

## 2022-08-29 RX ADMIN — INDOCYANINE GREEN AND WATER 2.5 MG: KIT at 09:13

## 2022-08-29 RX ADMIN — SODIUM CHLORIDE, POTASSIUM CHLORIDE, SODIUM LACTATE AND CALCIUM CHLORIDE: 600; 310; 30; 20 INJECTION, SOLUTION INTRAVENOUS at 10:17

## 2022-08-29 RX ADMIN — FENTANYL CITRATE 100 MCG: 50 INJECTION INTRAMUSCULAR; INTRAVENOUS at 10:17

## 2022-08-29 RX ADMIN — ROCURONIUM BROMIDE 20 MG: 10 SOLUTION INTRAVENOUS at 10:23

## 2022-08-29 RX ADMIN — KETOROLAC TROMETHAMINE 30 MG: 30 INJECTION, SOLUTION INTRAMUSCULAR at 13:13

## 2022-08-29 RX ADMIN — MIDAZOLAM HYDROCHLORIDE 2 MG: 1 INJECTION, SOLUTION INTRAMUSCULAR; INTRAVENOUS at 10:17

## 2022-08-29 RX ADMIN — FENTANYL CITRATE 50 MCG: 50 INJECTION INTRAMUSCULAR; INTRAVENOUS at 12:01

## 2022-08-29 RX ADMIN — NEOSTIGMINE 2 MG: 1 INJECTION INTRAVENOUS at 11:29

## 2022-08-29 RX ADMIN — ACETAMINOPHEN 650 MG: 325 TABLET, FILM COATED ORAL at 09:14

## 2022-08-29 RX ADMIN — PROPOFOL 150 MG: 10 INJECTION, EMULSION INTRAVENOUS at 10:23

## 2022-08-29 RX ADMIN — OXYCODONE HYDROCHLORIDE AND ACETAMINOPHEN 1 TABLET: 5; 325 TABLET ORAL at 12:22

## 2022-08-29 RX ADMIN — FAMOTIDINE 20 MG: 10 INJECTION INTRAVENOUS at 10:17

## 2022-08-29 RX ADMIN — Medication 40 MG: at 13:32

## 2022-08-29 RX ADMIN — ONDANSETRON 4 MG: 2 INJECTION INTRAMUSCULAR; INTRAVENOUS at 10:17

## 2022-08-29 RX ADMIN — SODIUM CHLORIDE, POTASSIUM CHLORIDE, SODIUM LACTATE AND CALCIUM CHLORIDE 125 ML/HR: 600; 310; 30; 20 INJECTION, SOLUTION INTRAVENOUS at 09:15

## 2022-08-29 NOTE — ANESTHESIA PROCEDURE NOTES
"Peripheral Block      Patient reassessed immediately prior to procedure    Patient location during procedure: OR  Reason for block: at surgeon's request and post-op pain management  Performed by  CRNA/CAA: Alicia Matthews CRNA  Preanesthetic Checklist  Completed: patient identified, IV checked, site marked, risks and benefits discussed, surgical consent, monitors and equipment checked, pre-op evaluation and timeout performed  Prep:  Pt Position: supine  Sterile barriers:cap, gloves, sterile barriers and mask  Prep: ChloraPrep  Patient monitoring: blood pressure monitoring, continuous pulse oximetry and EKG  Procedure    Nursing cardiac assessment comments yes: Sedation, GA, Spinal,Epidural   Performed under: general  Guidance:ultrasound guided    ULTRASOUND INTERPRETATION. Using ultrasound guidance a 20 G (20g 4\" Stimuplex) gauge needle was placed in close proximity to the nerve, at which point, under ultrasound guidance anesthetic was injected in the area of the nerve and spread of the anesthesia was seen on ultrasound in close proximity thereto.  There were no abnormalities seen on ultrasound; a digital image was taken; and the patient tolerated the procedure with no complications. Images:still images obtained    Laterality:Bilateral  Block Type:TAP  Injection Technique:single-shot  Needle Type:short-bevel  Needle Gauge:20 G  Resistance on Injection: none    Medications Used: ropivacaine (NAROPIN) injection 0.5 %, 250 mg  Med administered at 8/29/2022 10:28 AM      Medications  Preservative Free Saline:10ml  Comment:Block Injection:  Total volume divided equally between Right and Left block        Post Assessment  Injection Assessment: negative aspiration for heme, incremental injection and no paresthesia on injection  Patient Tolerance:comfortable throughout block  Complications:no  Additional Notes  The pt was in the supine position under general anesthesia.    Under Ultrasound guidance, a Aj [facility] 360 " degree needle was advanced with Normal Saline hydro dissection of tissue.  The Internal Oblique and Transversus Abdominus muscles where visualized.  At or before the aponeurosis of Internal Oblique, local anesthetic spread was visualized in the Transversus Abdominus Plane. Injection was made incrementally with aspiration every 5 mls.  There was no  intravascular injection,  injection pressure was normal, there was no neural injection, and the procedure was completed without difficulty. The same procedure was completed for left and right sided tap blocks. Thank You.

## 2022-08-29 NOTE — ANESTHESIA PROCEDURE NOTES
Airway  Urgency: elective    Date/Time: 8/29/2022 10:24 AM  Airway not difficult    General Information and Staff    Patient location during procedure: OR  CRNA/CAA: Alicia Matthews CRNA    Indications and Patient Condition  Indications for airway management: airway protection    Preoxygenated: yes  MILS maintained throughout  Mask difficulty assessment: 1 - vent by mask    Final Airway Details  Final airway type: endotracheal airway      Successful airway: ETT  Cuffed: yes   Successful intubation technique: direct laryngoscopy  Endotracheal tube insertion site: oral  Blade: Weiss  Blade size: 2  ETT size (mm): 7.0  Cormack-Lehane Classification: grade I - full view of glottis  Placement verified by: chest auscultation   Measured from: lips  ETT/EBT  to lips (cm): 21  Number of attempts at approach: 1  Assessment: lips, teeth, and gum same as pre-op and atraumatic intubation

## 2022-08-29 NOTE — ANESTHESIA POSTPROCEDURE EVALUATION
Patient: Avani Rm    Procedure Summary     Date: 08/29/22 Room / Location: [facility] OR 04 /  [facility] OR    Anesthesia Start: 1018 Anesthesia Stop: 1141    Procedure: CHOLECYSTECTOMY LAPAROSCOPIC WITH DAVINCI ROBOT (N/A Abdomen) Diagnosis:       Calculus of gallbladder without cholecystitis without obstruction      (Calculus of gallbladder without cholecystitis without obstruction [K80.20])    Surgeons: Preston Castro MD Provider: Howard Kramer MD    Anesthesia Type: general with block ASA Status: 3          Anesthesia Type: general with block    Vitals  Vitals Value Taken Time   /68 08/29/22 1216   Temp 97.6 °F (36.4 °C) 08/29/22 1216   Pulse 58 08/29/22 1216   Resp 18 08/29/22 1216   SpO2 98 % 08/29/22 1216           Post Anesthesia Care and Evaluation    Patient location during evaluation: PACU  Patient participation: complete - patient participated  Level of consciousness: awake  Pain score: 2  Pain management: adequate    Airway patency: patent  Anesthetic complications: No anesthetic complications  PONV Status: controlled  Cardiovascular status: acceptable and blood pressure returned to baseline  Respiratory status: acceptable and room air  Hydration status: acceptable    Comments: Patient comfortable with discharge at this time.

## 2022-08-29 NOTE — ANESTHESIA PREPROCEDURE EVALUATION
Anesthesia Evaluation     no history of anesthetic complications:  NPO Solid Status: > 8 hours  NPO Liquid Status: > 8 hours           Airway   Mallampati: II  TM distance: >3 FB  Neck ROM: full  No difficulty expected  Dental - normal exam     Pulmonary - normal exam   (+) a smoker Former,   Cardiovascular - normal exam        Neuro/Psych  (+) weakness, numbness,    GI/Hepatic/Renal/Endo      Musculoskeletal     Abdominal  - normal exam   Substance History      OB/GYN          Other        ROS/Med Hx Other: Multiple Sclerosis                    Anesthesia Plan    ASA 3     general with block     intravenous induction     Anesthetic plan, risks, benefits, and alternatives have been provided, discussed and informed consent has been obtained with: patient.

## 2022-08-31 LAB — REF LAB TEST METHOD: NORMAL

## 2022-09-09 ENCOUNTER — TELEPHONE (OUTPATIENT)
Dept: NEUROLOGY | Facility: CLINIC | Age: 32
End: 2022-09-09

## 2022-09-09 NOTE — TELEPHONE ENCOUNTER
Caller: Odilonleighton Avani    Relationship: Self    Best call back number: [phone]      What was the call regarding: PT IS REQUESTING AN APPT FOR HER INFUSION.  STATES THAT  HAS APPROVED THE INFUSION.        Do you require a callback: YES TO SCHEDULE

## 2022-09-09 NOTE — TELEPHONE ENCOUNTER
Spoke with patient. She said that when she went in to get her ID they have [place] listed as the address. I informed her the paperwork we pull up has her listed as being in [place]. She said she would call [facility] to get this changed. Told her we do have [facility] approved [facility] but we need approval from [facility] as well before she can infuse. She verbalized understanding.

## 2022-09-15 ENCOUNTER — OFFICE VISIT (OUTPATIENT)
Dept: SURGERY | Facility: CLINIC | Age: 32
End: 2022-09-15

## 2022-09-15 VITALS — BODY MASS INDEX: 28.89 KG/M2 | HEIGHT: 62 IN | WEIGHT: 157 LBS

## 2022-09-15 DIAGNOSIS — K80.20 CALCULUS OF GALLBLADDER WITHOUT CHOLECYSTITIS WITHOUT OBSTRUCTION: Primary | ICD-10-CM

## 2022-09-15 PROCEDURE — 99024 POSTOP FOLLOW-UP VISIT: CPT

## 2022-09-16 ENCOUNTER — TELEPHONE (OUTPATIENT)
Dept: NEUROLOGY | Facility: CLINIC | Age: 32
End: 2022-09-16

## 2022-09-16 NOTE — TELEPHONE ENCOUNTER
LVM letting patient know she has been approved for Ocrevus. Our infusion center will be reaching out to get her scheduled.

## 2022-09-28 ENCOUNTER — INFUSION (OUTPATIENT)
Dept: ONCOLOGY | Facility: HOSPITAL | Age: 32
End: 2022-09-28

## 2022-09-28 VITALS
RESPIRATION RATE: 18 BRPM | TEMPERATURE: 97.7 F | HEART RATE: 85 BPM | DIASTOLIC BLOOD PRESSURE: 60 MMHG | SYSTOLIC BLOOD PRESSURE: 110 MMHG

## 2022-09-28 DIAGNOSIS — G35 MULTIPLE SCLEROSIS: Primary | ICD-10-CM

## 2022-09-28 PROCEDURE — 96365 THER/PROPH/DIAG IV INF INIT: CPT

## 2022-09-28 PROCEDURE — 96375 TX/PRO/DX INJ NEW DRUG ADDON: CPT

## 2022-09-28 PROCEDURE — 96366 THER/PROPH/DIAG IV INF ADDON: CPT

## 2022-09-28 PROCEDURE — 25010000002 DIPHENHYDRAMINE PER 50 MG: Performed by: PSYCHIATRY & NEUROLOGY

## 2022-09-28 PROCEDURE — 96413 CHEMO IV INFUSION 1 HR: CPT

## 2022-09-28 PROCEDURE — 96415 CHEMO IV INFUSION ADDL HR: CPT

## 2022-09-28 PROCEDURE — 25010000002 OCRELIZUMAB 300 MG/10ML SOLUTION 300 MG VIAL: Performed by: PSYCHIATRY & NEUROLOGY

## 2022-09-28 PROCEDURE — 25010000002 METHYLPREDNISOLONE PER 125 MG: Performed by: PSYCHIATRY & NEUROLOGY

## 2022-09-28 RX ORDER — ACETAMINOPHEN 325 MG/1
650 TABLET ORAL EVERY 6 HOURS PRN
Status: CANCELLED | OUTPATIENT
Start: 2022-10-12

## 2022-09-28 RX ORDER — FAMOTIDINE 10 MG/ML
20 INJECTION, SOLUTION INTRAVENOUS AS NEEDED
Status: CANCELLED | OUTPATIENT
Start: 2022-10-12

## 2022-09-28 RX ORDER — ACETAMINOPHEN 325 MG/1
650 TABLET ORAL ONCE
Status: CANCELLED | OUTPATIENT
Start: 2022-10-12

## 2022-09-28 RX ORDER — DIPHENHYDRAMINE HYDROCHLORIDE 50 MG/ML
25 INJECTION INTRAMUSCULAR; INTRAVENOUS ONCE
Status: CANCELLED | OUTPATIENT
Start: 2022-10-12

## 2022-09-28 RX ORDER — DIPHENHYDRAMINE HYDROCHLORIDE 50 MG/ML
25 INJECTION INTRAMUSCULAR; INTRAVENOUS ONCE
Status: COMPLETED | OUTPATIENT
Start: 2022-09-28 | End: 2022-09-28

## 2022-09-28 RX ORDER — SODIUM CHLORIDE 9 MG/ML
250 INJECTION, SOLUTION INTRAVENOUS ONCE
Status: CANCELLED | OUTPATIENT
Start: 2022-10-12

## 2022-09-28 RX ORDER — IBUPROFEN 400 MG/1
400 TABLET ORAL EVERY 6 HOURS PRN
Status: CANCELLED | OUTPATIENT
Start: 2022-10-12

## 2022-09-28 RX ORDER — METHYLPREDNISOLONE SODIUM SUCCINATE 125 MG/2ML
100 INJECTION, POWDER, LYOPHILIZED, FOR SOLUTION INTRAMUSCULAR; INTRAVENOUS ONCE
Status: COMPLETED | OUTPATIENT
Start: 2022-09-28 | End: 2022-09-28

## 2022-09-28 RX ORDER — MEPERIDINE HYDROCHLORIDE 50 MG/ML
25 INJECTION INTRAMUSCULAR; INTRAVENOUS; SUBCUTANEOUS
Status: CANCELLED | OUTPATIENT
Start: 2022-10-12

## 2022-09-28 RX ORDER — ACETAMINOPHEN 325 MG/1
650 TABLET ORAL ONCE
Status: COMPLETED | OUTPATIENT
Start: 2022-09-28 | End: 2022-09-28

## 2022-09-28 RX ORDER — DIPHENHYDRAMINE HYDROCHLORIDE 50 MG/ML
50 INJECTION INTRAMUSCULAR; INTRAVENOUS AS NEEDED
Status: CANCELLED | OUTPATIENT
Start: 2022-10-12

## 2022-09-28 RX ORDER — SODIUM CHLORIDE 9 MG/ML
250 INJECTION, SOLUTION INTRAVENOUS ONCE
Status: COMPLETED | OUTPATIENT
Start: 2022-09-28 | End: 2022-09-28

## 2022-09-28 RX ORDER — METHYLPREDNISOLONE SODIUM SUCCINATE 125 MG/2ML
100 INJECTION, POWDER, LYOPHILIZED, FOR SOLUTION INTRAMUSCULAR; INTRAVENOUS ONCE
Status: CANCELLED | OUTPATIENT
Start: 2022-10-12

## 2022-09-28 RX ADMIN — DIPHENHYDRAMINE HYDROCHLORIDE 25 MG: 50 INJECTION INTRAMUSCULAR; INTRAVENOUS at 09:18

## 2022-09-28 RX ADMIN — OCRELIZUMAB 300 MG: 300 INJECTION INTRAVENOUS at 09:48

## 2022-09-28 RX ADMIN — ACETAMINOPHEN 650 MG: 325 TABLET ORAL at 09:18

## 2022-09-28 RX ADMIN — SODIUM CHLORIDE 250 ML: 9 INJECTION, SOLUTION INTRAVENOUS at 09:19

## 2022-09-28 RX ADMIN — METHYLPREDNISOLONE SODIUM SUCCINATE 100 MG: 125 INJECTION, POWDER, FOR SOLUTION INTRAMUSCULAR; INTRAVENOUS at 09:18

## 2022-10-12 ENCOUNTER — INFUSION (OUTPATIENT)
Dept: ONCOLOGY | Facility: HOSPITAL | Age: 32
End: 2022-10-12

## 2022-10-12 VITALS
DIASTOLIC BLOOD PRESSURE: 61 MMHG | TEMPERATURE: 97.6 F | RESPIRATION RATE: 18 BRPM | WEIGHT: 160 LBS | HEART RATE: 84 BPM | HEIGHT: 62 IN | BODY MASS INDEX: 29.44 KG/M2 | SYSTOLIC BLOOD PRESSURE: 111 MMHG

## 2022-10-12 DIAGNOSIS — G35 MULTIPLE SCLEROSIS: Primary | ICD-10-CM

## 2022-10-12 PROCEDURE — 96375 TX/PRO/DX INJ NEW DRUG ADDON: CPT

## 2022-10-12 PROCEDURE — 25010000002 METHYLPREDNISOLONE PER 125 MG: Performed by: PSYCHIATRY & NEUROLOGY

## 2022-10-12 PROCEDURE — 96413 CHEMO IV INFUSION 1 HR: CPT

## 2022-10-12 PROCEDURE — 96376 TX/PRO/DX INJ SAME DRUG ADON: CPT

## 2022-10-12 PROCEDURE — 25010000002 OCRELIZUMAB 300 MG/10ML SOLUTION 300 MG VIAL: Performed by: PSYCHIATRY & NEUROLOGY

## 2022-10-12 PROCEDURE — 96365 THER/PROPH/DIAG IV INF INIT: CPT

## 2022-10-12 PROCEDURE — 96415 CHEMO IV INFUSION ADDL HR: CPT

## 2022-10-12 PROCEDURE — 25010000002 DIPHENHYDRAMINE PER 50 MG: Performed by: PSYCHIATRY & NEUROLOGY

## 2022-10-12 PROCEDURE — 96366 THER/PROPH/DIAG IV INF ADDON: CPT

## 2022-10-12 RX ORDER — METHYLPREDNISOLONE SODIUM SUCCINATE 125 MG/2ML
100 INJECTION, POWDER, LYOPHILIZED, FOR SOLUTION INTRAMUSCULAR; INTRAVENOUS ONCE
Status: COMPLETED | OUTPATIENT
Start: 2022-10-12 | End: 2022-10-12

## 2022-10-12 RX ORDER — MEPERIDINE HYDROCHLORIDE 50 MG/ML
25 INJECTION INTRAMUSCULAR; INTRAVENOUS; SUBCUTANEOUS
Status: CANCELLED | OUTPATIENT
Start: 2022-10-12

## 2022-10-12 RX ORDER — IBUPROFEN 400 MG/1
400 TABLET ORAL EVERY 6 HOURS PRN
Status: CANCELLED | OUTPATIENT
Start: 2022-10-12

## 2022-10-12 RX ORDER — DIPHENHYDRAMINE HYDROCHLORIDE 50 MG/ML
25 INJECTION INTRAMUSCULAR; INTRAVENOUS ONCE
Status: COMPLETED | OUTPATIENT
Start: 2022-10-12 | End: 2022-10-12

## 2022-10-12 RX ORDER — DIPHENHYDRAMINE HYDROCHLORIDE 50 MG/ML
25 INJECTION INTRAMUSCULAR; INTRAVENOUS ONCE
Status: CANCELLED | OUTPATIENT
Start: 2022-10-12

## 2022-10-12 RX ORDER — ACETAMINOPHEN 325 MG/1
650 TABLET ORAL ONCE
Status: CANCELLED | OUTPATIENT
Start: 2022-10-12

## 2022-10-12 RX ORDER — ACETAMINOPHEN 325 MG/1
650 TABLET ORAL EVERY 6 HOURS PRN
Status: CANCELLED | OUTPATIENT
Start: 2022-10-12

## 2022-10-12 RX ORDER — ACETAMINOPHEN 325 MG/1
650 TABLET ORAL ONCE
Status: COMPLETED | OUTPATIENT
Start: 2022-10-12 | End: 2022-10-12

## 2022-10-12 RX ORDER — SODIUM CHLORIDE 9 MG/ML
250 INJECTION, SOLUTION INTRAVENOUS ONCE
Status: CANCELLED | OUTPATIENT
Start: 2022-10-12

## 2022-10-12 RX ORDER — METHYLPREDNISOLONE SODIUM SUCCINATE 125 MG/2ML
100 INJECTION, POWDER, LYOPHILIZED, FOR SOLUTION INTRAMUSCULAR; INTRAVENOUS ONCE
Status: CANCELLED | OUTPATIENT
Start: 2022-10-12

## 2022-10-12 RX ORDER — DIPHENHYDRAMINE HYDROCHLORIDE 50 MG/ML
50 INJECTION INTRAMUSCULAR; INTRAVENOUS AS NEEDED
Status: CANCELLED | OUTPATIENT
Start: 2022-10-12

## 2022-10-12 RX ORDER — FAMOTIDINE 10 MG/ML
20 INJECTION, SOLUTION INTRAVENOUS AS NEEDED
Status: CANCELLED | OUTPATIENT
Start: 2022-10-12

## 2022-10-12 RX ADMIN — DIPHENHYDRAMINE HYDROCHLORIDE 25 MG: 50 INJECTION INTRAMUSCULAR; INTRAVENOUS at 08:13

## 2022-10-12 RX ADMIN — METHYLPREDNISOLONE SODIUM SUCCINATE 100 MG: 125 INJECTION, POWDER, FOR SOLUTION INTRAMUSCULAR; INTRAVENOUS at 08:17

## 2022-10-12 RX ADMIN — OCRELIZUMAB 300 MG: 300 INJECTION INTRAVENOUS at 08:50

## 2022-10-12 RX ADMIN — ACETAMINOPHEN 650 MG: 325 TABLET ORAL at 08:13

## 2022-11-18 ENCOUNTER — HOSPITAL ENCOUNTER (EMERGENCY)
Facility: HOSPITAL | Age: 32
Discharge: HOME | End: 2022-11-18
Attending: EMERGENCY MEDICINE | Admitting: EMERGENCY MEDICINE

## 2022-11-18 ENCOUNTER — APPOINTMENT (OUTPATIENT)
Dept: CT IMAGING | Facility: HOSPITAL | Age: 32
End: 2022-11-18

## 2022-11-18 ENCOUNTER — APPOINTMENT (OUTPATIENT)
Dept: GENERAL RADIOLOGY | Facility: HOSPITAL | Age: 32
End: 2022-11-18

## 2022-11-18 VITALS
RESPIRATION RATE: 18 BRPM | HEIGHT: 60 IN | TEMPERATURE: 98.2 F | WEIGHT: 156 LBS | HEART RATE: 78 BPM | DIASTOLIC BLOOD PRESSURE: 64 MMHG | BODY MASS INDEX: 30.63 KG/M2 | SYSTOLIC BLOOD PRESSURE: 108 MMHG | OXYGEN SATURATION: 98 %

## 2022-11-18 DIAGNOSIS — R07.9 CHEST PAIN, UNSPECIFIED TYPE: Primary | ICD-10-CM

## 2022-11-18 LAB
ALBUMIN SERPL-MCNC: 3.95 G/DL (ref 3.5–5.2)
ALBUMIN/GLOB SERPL: 1.5 G/DL
ALP SERPL-CCNC: 61 U/L (ref 39–117)
ALT SERPL W P-5'-P-CCNC: 8 U/L (ref 1–33)
AMYLASE SERPL-CCNC: 68 U/L (ref 28–100)
ANION GAP SERPL CALCULATED.3IONS-SCNC: 9.9 MMOL/L (ref 5–15)
AST SERPL-CCNC: 14 U/L (ref 1–32)
BASOPHILS # BLD AUTO: 0.04 10*3/MM3 (ref 0–0.2)
BASOPHILS NFR BLD AUTO: 0.8 % (ref 0–1.5)
BILIRUB SERPL-MCNC: 0.3 MG/DL (ref 0–1.2)
BUN SERPL-MCNC: 8 MG/DL (ref 6–20)
BUN/CREAT SERPL: 12.9 (ref 7–25)
CALCIUM SPEC-SCNC: 8.6 MG/DL (ref 8.6–10.5)
CHLORIDE SERPL-SCNC: 106 MMOL/L (ref 98–107)
CO2 SERPL-SCNC: 21.1 MMOL/L (ref 22–29)
CREAT SERPL-MCNC: 0.62 MG/DL (ref 0.57–1)
D DIMER PPP FEU-MCNC: 0.47 MCGFEU/ML (ref 0–0.5)
DEPRECATED RDW RBC AUTO: 39.8 FL (ref 37–54)
EGFRCR SERPLBLD CKD-EPI 2021: 120 ML/MIN/1.73
EOSINOPHIL # BLD AUTO: 0.19 10*3/MM3 (ref 0–0.4)
EOSINOPHIL NFR BLD AUTO: 3.9 % (ref 0.3–6.2)
ERYTHROCYTE [DISTWIDTH] IN BLOOD BY AUTOMATED COUNT: 13 % (ref 12.3–15.4)
GLOBULIN UR ELPH-MCNC: 2.7 GM/DL
GLUCOSE SERPL-MCNC: 106 MG/DL (ref 65–99)
HCG SERPL QL: NEGATIVE
HCT VFR BLD AUTO: 36 % (ref 34–46.6)
HGB BLD-MCNC: 11.7 G/DL (ref 12–15.9)
HOLD SPECIMEN: NORMAL
HOLD SPECIMEN: NORMAL
IMM GRANULOCYTES # BLD AUTO: 0.02 10*3/MM3 (ref 0–0.05)
IMM GRANULOCYTES NFR BLD AUTO: 0.4 % (ref 0–0.5)
LIPASE SERPL-CCNC: 55 U/L (ref 13–60)
LYMPHOCYTES # BLD AUTO: 1.73 10*3/MM3 (ref 0.7–3.1)
LYMPHOCYTES NFR BLD AUTO: 35.9 % (ref 19.6–45.3)
MCH RBC QN AUTO: 27.1 PG (ref 26.6–33)
MCHC RBC AUTO-ENTMCNC: 32.5 G/DL (ref 31.5–35.7)
MCV RBC AUTO: 83.3 FL (ref 79–97)
MONOCYTES # BLD AUTO: 0.4 10*3/MM3 (ref 0.1–0.9)
MONOCYTES NFR BLD AUTO: 8.3 % (ref 5–12)
NEUTROPHILS NFR BLD AUTO: 2.44 10*3/MM3 (ref 1.7–7)
NEUTROPHILS NFR BLD AUTO: 50.7 % (ref 42.7–76)
NRBC BLD AUTO-RTO: 0 /100 WBC (ref 0–0.2)
PLATELET # BLD AUTO: 215 10*3/MM3 (ref 140–450)
PMV BLD AUTO: 11.2 FL (ref 6–12)
POTASSIUM SERPL-SCNC: 3.9 MMOL/L (ref 3.5–5.2)
PROT SERPL-MCNC: 6.6 G/DL (ref 6–8.5)
QT INTERVAL: 398 MS
QTC INTERVAL: 423 MS
RBC # BLD AUTO: 4.32 10*6/MM3 (ref 3.77–5.28)
SODIUM SERPL-SCNC: 137 MMOL/L (ref 136–145)
TROPONIN T SERPL-MCNC: <0.01 NG/ML (ref 0–0.03)
TROPONIN T SERPL-MCNC: <0.01 NG/ML (ref 0–0.03)
WBC NRBC COR # BLD: 4.82 10*3/MM3 (ref 3.4–10.8)
WHOLE BLOOD HOLD COAG: NORMAL
WHOLE BLOOD HOLD SPECIMEN: NORMAL

## 2022-11-18 PROCEDURE — 84703 CHORIONIC GONADOTROPIN ASSAY: CPT | Performed by: EMERGENCY MEDICINE

## 2022-11-18 PROCEDURE — 85025 COMPLETE CBC W/AUTO DIFF WBC: CPT | Performed by: EMERGENCY MEDICINE

## 2022-11-18 PROCEDURE — 84484 ASSAY OF TROPONIN QUANT: CPT | Performed by: EMERGENCY MEDICINE

## 2022-11-18 PROCEDURE — 83690 ASSAY OF LIPASE: CPT | Performed by: EMERGENCY MEDICINE

## 2022-11-18 PROCEDURE — 71045 X-RAY EXAM CHEST 1 VIEW: CPT | Performed by: RADIOLOGY

## 2022-11-18 PROCEDURE — 71275 CT ANGIOGRAPHY CHEST: CPT | Performed by: RADIOLOGY

## 2022-11-18 PROCEDURE — 93010 ELECTROCARDIOGRAM REPORT: CPT | Performed by: INTERNAL MEDICINE

## 2022-11-18 PROCEDURE — 93005 ELECTROCARDIOGRAM TRACING: CPT | Performed by: EMERGENCY MEDICINE

## 2022-11-18 PROCEDURE — 71045 X-RAY EXAM CHEST 1 VIEW: CPT

## 2022-11-18 PROCEDURE — 85379 FIBRIN DEGRADATION QUANT: CPT | Performed by: EMERGENCY MEDICINE

## 2022-11-18 PROCEDURE — 0 IOPAMIDOL PER 1 ML: Performed by: EMERGENCY MEDICINE

## 2022-11-18 PROCEDURE — 71275 CT ANGIOGRAPHY CHEST: CPT

## 2022-11-18 PROCEDURE — 99284 EMERGENCY DEPT VISIT MOD MDM: CPT

## 2022-11-18 PROCEDURE — 80053 COMPREHEN METABOLIC PANEL: CPT | Performed by: EMERGENCY MEDICINE

## 2022-11-18 PROCEDURE — 82150 ASSAY OF AMYLASE: CPT | Performed by: EMERGENCY MEDICINE

## 2022-11-18 RX ORDER — ALUMINA, MAGNESIA, AND SIMETHICONE 2400; 2400; 240 MG/30ML; MG/30ML; MG/30ML
15 SUSPENSION ORAL ONCE
Status: COMPLETED | OUTPATIENT
Start: 2022-11-18 | End: 2022-11-18

## 2022-11-18 RX ORDER — SODIUM CHLORIDE 0.9 % (FLUSH) 0.9 %
10 SYRINGE (ML) INJECTION AS NEEDED
Status: DISCONTINUED | OUTPATIENT
Start: 2022-11-18 | End: 2022-11-18 | Stop reason: HOSPADM

## 2022-11-18 RX ORDER — LIDOCAINE HYDROCHLORIDE 20 MG/ML
15 SOLUTION OROPHARYNGEAL ONCE
Status: COMPLETED | OUTPATIENT
Start: 2022-11-18 | End: 2022-11-18

## 2022-11-18 RX ORDER — ASPIRIN 81 MG/1
324 TABLET, CHEWABLE ORAL ONCE
Status: COMPLETED | OUTPATIENT
Start: 2022-11-18 | End: 2022-11-18

## 2022-11-18 RX ADMIN — ASPIRIN 324 MG: 81 TABLET, CHEWABLE ORAL at 08:13

## 2022-11-18 RX ADMIN — ALUMINUM HYDROXIDE, MAGNESIUM HYDROXIDE, AND DIMETHICONE 15 ML: 400; 400; 40 SUSPENSION ORAL at 09:02

## 2022-11-18 RX ADMIN — IOPAMIDOL 88 ML: 755 INJECTION, SOLUTION INTRAVENOUS at 11:59

## 2022-11-18 RX ADMIN — LIDOCAINE HYDROCHLORIDE 15 ML: 20 SOLUTION ORAL; TOPICAL at 09:03

## 2023-02-28 RX ORDER — MEPERIDINE HYDROCHLORIDE 50 MG/ML
25 INJECTION INTRAMUSCULAR; INTRAVENOUS; SUBCUTANEOUS
Status: CANCELLED | OUTPATIENT
Start: 2023-02-28

## 2023-02-28 RX ORDER — DIPHENHYDRAMINE HYDROCHLORIDE 50 MG/ML
25 INJECTION INTRAMUSCULAR; INTRAVENOUS ONCE
Status: CANCELLED | OUTPATIENT
Start: 2023-02-28

## 2023-02-28 RX ORDER — ACETAMINOPHEN 325 MG/1
650 TABLET ORAL ONCE
Status: CANCELLED | OUTPATIENT
Start: 2023-02-28

## 2023-02-28 RX ORDER — SODIUM CHLORIDE 9 MG/ML
250 INJECTION, SOLUTION INTRAVENOUS ONCE
Status: CANCELLED | OUTPATIENT
Start: 2023-02-28

## 2023-02-28 RX ORDER — FAMOTIDINE 10 MG/ML
20 INJECTION, SOLUTION INTRAVENOUS AS NEEDED
Status: CANCELLED | OUTPATIENT
Start: 2023-02-28

## 2023-02-28 RX ORDER — ACETAMINOPHEN 325 MG/1
650 TABLET ORAL EVERY 6 HOURS PRN
Status: CANCELLED | OUTPATIENT
Start: 2023-02-28

## 2023-02-28 RX ORDER — IBUPROFEN 400 MG/1
400 TABLET ORAL EVERY 6 HOURS PRN
Status: CANCELLED | OUTPATIENT
Start: 2023-02-28

## 2023-02-28 RX ORDER — DIPHENHYDRAMINE HYDROCHLORIDE 50 MG/ML
50 INJECTION INTRAMUSCULAR; INTRAVENOUS AS NEEDED
Status: CANCELLED | OUTPATIENT
Start: 2023-02-28

## 2023-03-07 NOTE — TELEPHONE ENCOUNTER
Can I put patient on your schedule tomorrow?   How Severe Is Your Skin Lesion?: mild Have Your Skin Lesions Been Treated?: not been treated Is This A New Presentation, Or A Follow-Up?: Skin Lesions

## 2023-03-21 ENCOUNTER — PATIENT ROUNDING (BHMG ONLY) (OUTPATIENT)
Dept: FAMILY MEDICINE CLINIC | Facility: CLINIC | Age: 33
End: 2023-03-21
Payer: OTHER GOVERNMENT

## 2023-03-21 ENCOUNTER — TELEPHONE (OUTPATIENT)
Dept: FAMILY MEDICINE CLINIC | Facility: CLINIC | Age: 33
End: 2023-03-21

## 2023-03-21 ENCOUNTER — OFFICE VISIT (OUTPATIENT)
Dept: FAMILY MEDICINE CLINIC | Facility: CLINIC | Age: 33
End: 2023-03-21
Payer: OTHER GOVERNMENT

## 2023-03-21 VITALS
OXYGEN SATURATION: 98 % | DIASTOLIC BLOOD PRESSURE: 66 MMHG | HEART RATE: 76 BPM | SYSTOLIC BLOOD PRESSURE: 118 MMHG | TEMPERATURE: 97.8 F | BODY MASS INDEX: 32.22 KG/M2 | WEIGHT: 165 LBS

## 2023-03-21 DIAGNOSIS — E55.9 VITAMIN D DEFICIENCY: Chronic | ICD-10-CM

## 2023-03-21 DIAGNOSIS — E66.09 CLASS 1 OBESITY DUE TO EXCESS CALORIES WITH SERIOUS COMORBIDITY AND BODY MASS INDEX (BMI) OF 32.0 TO 32.9 IN ADULT: ICD-10-CM

## 2023-03-21 DIAGNOSIS — K21.9 GASTROESOPHAGEAL REFLUX DISEASE WITHOUT ESOPHAGITIS: Primary | Chronic | ICD-10-CM

## 2023-03-21 DIAGNOSIS — G35 MULTIPLE SCLEROSIS: ICD-10-CM

## 2023-03-21 PROBLEM — S52.91XA CLOSED FRACTURE OF RIGHT RADIUS AND ULNA: Status: RESOLVED | Noted: 2018-03-27 | Resolved: 2023-03-21

## 2023-03-21 PROBLEM — K80.20 CALCULUS OF GALLBLADDER WITHOUT CHOLECYSTITIS WITHOUT OBSTRUCTION: Status: RESOLVED | Noted: 2022-07-28 | Resolved: 2023-03-21

## 2023-03-21 PROBLEM — E66.811 CLASS 1 OBESITY DUE TO EXCESS CALORIES WITH SERIOUS COMORBIDITY AND BODY MASS INDEX (BMI) OF 32.0 TO 32.9 IN ADULT: Status: ACTIVE | Noted: 2023-03-21

## 2023-03-21 PROBLEM — S52.201A CLOSED FRACTURE OF RIGHT RADIUS AND ULNA: Status: RESOLVED | Noted: 2018-03-27 | Resolved: 2023-03-21

## 2023-03-21 PROBLEM — R20.2 NUMBNESS AND TINGLING: Status: RESOLVED | Noted: 2020-09-21 | Resolved: 2023-03-21

## 2023-03-21 PROBLEM — R20.0 NUMBNESS AND TINGLING: Status: RESOLVED | Noted: 2020-09-21 | Resolved: 2023-03-21

## 2023-03-21 PROCEDURE — 99203 OFFICE O/P NEW LOW 30 MIN: CPT | Performed by: INTERNAL MEDICINE

## 2023-03-21 RX ORDER — CHOLECALCIFEROL (VITAMIN D3) 1250 MCG
50000 CAPSULE ORAL WEEKLY
COMMUNITY
Start: 2023-03-18

## 2023-03-21 RX ORDER — TIRZEPATIDE 2.5 MG/.5ML
2.5 INJECTION, SOLUTION SUBCUTANEOUS WEEKLY
Qty: 2 ML | Refills: 5 | Status: SHIPPED | OUTPATIENT
Start: 2023-03-21 | End: 2023-03-22

## 2023-03-21 RX ORDER — ESOMEPRAZOLE MAGNESIUM 40 MG/1
40 CAPSULE, DELAYED RELEASE ORAL
Qty: 30 CAPSULE | Refills: 5 | Status: SHIPPED | OUTPATIENT
Start: 2023-03-21

## 2023-03-21 NOTE — PROGRESS NOTES
Patient Name: Avani Rm Today's Date: 3/21/2023   Patient MRN / CSN: [id number] / [id number] Date of Encounter: 3/21/2023   Patient Age / : 32 y.o. / 1990 Encounter Provider: Crystal Coy DO   Referring Physician: No ref. provider found          Avani is a 32 y.o. female who is being seen today for Establish Care      History of Present Illness     Avani presents today to establish care with complaints of gastroesophageal reflux disease and obesity.  She reports having heartburn every day.  She reports frequently feeling epigastric pain.  She went to the emergency department last fall because she felt that she was having a heart attack at the time and was told that her symptoms were due to gastroesophageal reflux disease.  She has been taking omeprazole intermittently without relief.  She denies any blood in stool.    Avani reports being frustrated with obesity and the inability to lose weight.  She has been trying a low-carb diet for the past year.  She has also been trying to exercise at the gym 4 times weekly, as tolerated over the past 6 months.  She has MS and reports that some days she physically cannot exercise as much as she would like due to weakness.  She started Ocrevus IV infusions as prescribed by her neurologist 6 months ago and felt that her physical capacity improved after starting this medicine.  She is been able to be more consistent with her exercise over the past 6 months but is still unable to lose weight despite these measures.  She actually gained 5 pounds during this timeframe.  She had labs done a couple weeks ago with her previous PCP and reports being told that the labs were okay other than her vitamin D level was low when she was started on a weekly supplement.  She is interested in trying GLP-1 therapy in addition to her lifestyle changes for weight loss.  She denies any family history of thyroid cancer or men syndrome.  She denies any personal history of  pancreatitis.    Allergies include:Codeine  Current Outpatient Medications   Medication Sig Dispense Refill   • Cholecalciferol (Vitamin D3) 1.25 MG (28230 UT) capsule Take 1 capsule by mouth 1 (One) Time Per Week.     • Ocrelizumab (OCREVUS IV) Infuse  into a venous catheter Every 6 (Six) Months.     • esomeprazole (nexIUM) 40 MG capsule Take 1 capsule by mouth Every Morning Before Breakfast. 30 capsule 5   • Tirzepatide (Mounjaro) 2.5 MG/0.5ML solution pen-injector Inject 0.5 mL under the skin into the appropriate area as directed 1 (One) Time Per Week. 2 mL 5     No current facility-administered medications for this visit.     Past Medical History:   Diagnosis Date   • Fracture, radius and ulna, proximal     right   • Gastroesophageal reflux disease without esophagitis 3/21/2023   • Multiple sclerosis (HCC) 10/05/2020   • PBA (pseudobulbar affect) 08/10/2021     Family History   Problem Relation Age of Onset   • No Known Problems Mother    • Cancer Father    • No Known Problems Sister    • No Known Problems Brother    • No Known Problems Maternal Aunt    • No Known Problems Maternal Uncle    • No Known Problems Paternal Aunt    • No Known Problems Paternal Uncle    • No Known Problems Maternal Grandmother    • No Known Problems Maternal Grandfather    • No Known Problems Paternal Grandmother    • Cancer Paternal Grandfather    • No Known Problems Other    • Ataxia Neg Hx    • Chorea Neg Hx    • Dementia Neg Hx    • Mental retardation Neg Hx    • Migraines Neg Hx    • Multiple sclerosis Neg Hx    • Neurofibromatosis Neg Hx    • Neuropathy Neg Hx    • Parkinsonism Neg Hx    • Seizures Neg Hx    • Stroke Neg Hx      Past Surgical History:   Procedure Laterality Date   •  SECTION      x 3   •  SECTION WITH TUBAL     • CHOLECYSTECTOMY N/A 2022    Procedure: CHOLECYSTECTOMY LAPAROSCOPIC WITH DAVINCI ROBOT;  Surgeon: Preston Castro MD;  Location: [facility];  Service: Robotics - DaVinci;   Laterality: N/A;   • ORIF ULNA/RADIUS FRACTURES Right 03/27/2018    Procedure: ULNA/RADIUS OPEN REDUCTION INTERNAL FIXATION;  Surgeon: Benja Keene MD;  Location: [facility];  Service: Orthopedics   • SALPINGECTOMY     • TONSILLECTOMY     • WISDOM TOOTH EXTRACTION       Social History     Substance and Sexual Activity   Alcohol Use No     Social History     Tobacco Use   Smoking Status Former   • Packs/day: 1.00   • Years: 10.00   • Pack years: 10.00   • Types: Cigarettes   • Quit date: 02/2020   • Years since quitting: 3.1   Smokeless Tobacco Never     Social History     Substance and Sexual Activity   Drug Use No     Review of Systems   Constitutional: Positive for fatigue.   Gastrointestinal: Positive for abdominal pain. Negative for blood in stool.   Genitourinary: Negative for hematuria.   Neurological: Positive for weakness.        Overall improved with Ocrevus infusions but notices weakness, confusion and coordination problems at times.        Depression Assessment Review:  PHQ-9 Total Score: 0  Vital Signs & Measurements Taken This Encounter  /66 (BP Location: Left arm, Patient Position: Sitting, Cuff Size: Adult)   Pulse 76   Temp 97.8 °F (36.6 °C) (Temporal)   Wt 74.8 kg (165 lb)   SpO2 98%   BMI 32.22 kg/m²    SpO2 Percentage    03/21/23 1301   SpO2: 98%        BMI is >= 30 and <35. (Class 1 Obesity). The following options were offered after discussion;: nutrition counseling/recommendations. Patient reports trying to eat a lower carb diet for 1 year (or longer) but has not been able to lose weight despite those changes. She is trying to exercise when her physical capabilities allow, given that she has MS. She tries exercising at the gym 4 times weekly as tolerated, and has been doing this for the past 6 months. She has gained 5 lbs in the past 6 months, despite these lifestyle changes.       Physical Exam  Vitals reviewed.   Constitutional:       General: She is not in acute  distress.  HENT:      Head: Normocephalic and atraumatic.   Eyes:      General: No scleral icterus.     Extraocular Movements: Extraocular movements intact.      Conjunctiva/sclera: Conjunctivae normal.      Pupils: Pupils are equal, round, and reactive to light.   Cardiovascular:      Rate and Rhythm: Normal rate and regular rhythm.   Pulmonary:      Effort: Pulmonary effort is normal. No respiratory distress.      Breath sounds: Normal breath sounds. No wheezing or rhonchi.   Abdominal:      Palpations: Abdomen is soft.      Tenderness: There is no abdominal tenderness. There is no guarding or rebound.   Musculoskeletal:         General: No swelling.      Cervical back: Neck supple. No tenderness.   Lymphadenopathy:      Cervical: No cervical adenopathy.   Skin:     General: Skin is warm and dry.      Coloration: Skin is not jaundiced.   Neurological:      Mental Status: She is alert.   Psychiatric:         Mood and Affect: Mood normal.         Behavior: Behavior normal.           Assessment & Plan  Patient Active Problem List   Diagnosis   • Multiple sclerosis (HCC)   • Concentration deficit   • PBA (pseudobulbar affect)   • Gastroesophageal reflux disease without esophagitis   • Vitamin D deficiency   • Class 1 obesity due to excess calories with serious comorbidity and body mass index (BMI) of 32.0 to 32.9 in adult       ICD-10-CM ICD-9-CM   1. Gastroesophageal reflux disease without esophagitis  K21.9 530.81   2. Class 1 obesity due to excess calories with serious comorbidity and body mass index (BMI) of 32.0 to 32.9 in adult  E66.09 278.00    Z68.32 V85.32   3. Multiple sclerosis (HCC)  G35 340   4. Vitamin D deficiency  E55.9 268.9     No orders of the defined types were placed in this encounter.      Meds Ordered During Visit:  New Medications Ordered This Visit   Medications   • esomeprazole (nexIUM) 40 MG capsule     Sig: Take 1 capsule by mouth Every Morning Before Breakfast.     Dispense:  30 capsule      Refill:  5   • Tirzepatide (Mounjaro) 2.5 MG/0.5ML solution pen-injector     Sig: Inject 0.5 mL under the skin into the appropriate area as directed 1 (One) Time Per Week.     Dispense:  2 mL     Refill:  5     We will request her records from her previous PCP including her recent lab results.  I discussed Mounjaro therapy in detail with patient.  She has a coupon to use for this.  Also recommended Nexium to take daily.  I encouraged her to continue healthy lifestyle changes for improvement of her weight and overall health.  I also encouraged her to follow-up with her neurologist as planned.  I reviewed neurology notes.      Return in about 1 month (around 4/21/2023), or if symptoms worsen or fail to improve, for Obtain records from previous PCP-recent labs, Natividad Vieyra.          Referring Provider (if known): No ref. provider found      This document has been electronically signed by Crystal Coy DO  March 21, 2023 14:01 EDT    Crystal Coy DO, FACOI  [address] [place] [address]  [phone] (office)    Part of this note may be an electronic transcription/translation of spoken language to printed text using the Dragon Dictation System.

## 2023-03-21 NOTE — TELEPHONE ENCOUNTER
Caller: TURNER BOTELLO    Relationship to patient: Emergency Contact    Best call back number: [phone]    Patient is needing: PATIENT STATED THAT PRIOR AUTHORIZATION WOULD BE NEEDED FOR Tirzepatide (Mounjaro) 2.5 MG/0.5ML solution pen-injector ; PATIENT WOULD LIKE A CALL ON PROGRESS OF HOW THAT PA WORKS    PLEASE ADVISE

## 2023-03-22 ENCOUNTER — PRIOR AUTHORIZATION (OUTPATIENT)
Dept: FAMILY MEDICINE CLINIC | Facility: CLINIC | Age: 33
End: 2023-03-22
Payer: OTHER GOVERNMENT

## 2023-03-22 DIAGNOSIS — E66.09 CLASS 1 OBESITY DUE TO EXCESS CALORIES WITH SERIOUS COMORBIDITY AND BODY MASS INDEX (BMI) OF 32.0 TO 32.9 IN ADULT: Primary | ICD-10-CM

## 2023-03-22 NOTE — TELEPHONE ENCOUNTER
Mounjaro Denied:    Pt is not a type II diabetic.  See below    Our guideline named GLP-1 RECEPTOR AGONISTS requires the following rule(s) be met for  approval:  A. The member meets ONE of the followin. The member has a diagnosis of type 2 diabetes [a disorder with high blood sugar] without   chronic [long term] kidney disease and had tried and failed [drug did not work] metformin.   2. The member has a diagnosis of type 2 diabetes [a disorder with high blood sugar] with   This letter may contain confidential individually identifiable health information protected under   the Health Insurance Portability and Accountability Act of 1996 (“HIPAA”) and other statutes.  [address] [address] [facility], [place] [address]   P [phone]   [email or web address]  chronic [long term] kidney disease and had a trial and therapeutic failure [drug did not work],   allergy, contraindication [harmful for] (including potential drug-drug interactions with other   medications) or intolerance [side effect] to concurrent metformin and SGLT2 inhibitor (such   as Jardiance).   3. The member has a diagnosis of type 2 diabetes [a disorder with high blood sugar] and   atherosclerotic cardiovascular disease [a type of heart condition].   4. The member has a diagnosis of type 2 diabetes [a disorder with high blood sugar] and   heart failure with reduced ejection fraction [a type of heart condition] and had a trial and   therapeutic failure [drug did not work], allergy, contraindication [harmful for] (including   potential drug-drug

## 2023-03-23 ENCOUNTER — TELEPHONE (OUTPATIENT)
Dept: FAMILY MEDICINE CLINIC | Facility: CLINIC | Age: 33
End: 2023-03-23
Payer: OTHER GOVERNMENT

## 2023-03-23 DIAGNOSIS — E66.09 CLASS 1 OBESITY DUE TO EXCESS CALORIES WITH SERIOUS COMORBIDITY AND BODY MASS INDEX (BMI) OF 32.0 TO 32.9 IN ADULT: ICD-10-CM

## 2023-03-23 NOTE — TELEPHONE ENCOUNTER
Caller: Avani Rm    Relationship: Self    Best call back number: [phone]    Requested Prescriptions:   Requested Prescriptions     Pending Prescriptions Disp Refills   • Semaglutide-Weight Management 0.25 MG/0.5ML solution auto-injector 2 mL 5     Sig: Inject 0.25 mg under the skin into the appropriate area as directed 1 (One) Time Per Week.        Pharmacy where request should be sent: [facility] [id number] [place] [id number] [facility] AT [address] [place] [address] [phone] [facility] [phone] FX     Last office visit with prescribing clinician: 3/21/2023   Last telemedicine visit with prescribing clinician: 4/26/2023   Next office visit with prescribing clinician: 4/26/2023     Additional details provided by patient: PATIENT WOULD LIKE TO CHANGE AWAY FROM MOUNJARO, TO OZEMPIC OR WEGOVY, OR SOMETHING THAT IS MORE EASILY COVERED BY HER INSURANCE.    Does the patient have less than a 3 day supply:  [x] Yes  [] No    Would you like a call back once the refill request has been completed: [x] Yes [] No    If the office needs to give you a call back, can they leave a voicemail: [x] Yes [] No    Andres Cote Rep   03/23/23 16:31 EDT

## 2023-03-28 ENCOUNTER — TELEPHONE (OUTPATIENT)
Dept: FAMILY MEDICINE CLINIC | Facility: CLINIC | Age: 33
End: 2023-03-28
Payer: OTHER GOVERNMENT

## 2023-03-28 ENCOUNTER — INFUSION (OUTPATIENT)
Dept: ONCOLOGY | Facility: HOSPITAL | Age: 33
End: 2023-03-28
Payer: OTHER GOVERNMENT

## 2023-03-28 VITALS
DIASTOLIC BLOOD PRESSURE: 65 MMHG | RESPIRATION RATE: 16 BRPM | HEART RATE: 80 BPM | TEMPERATURE: 97.3 F | SYSTOLIC BLOOD PRESSURE: 113 MMHG

## 2023-03-28 DIAGNOSIS — G35 MULTIPLE SCLEROSIS: Primary | ICD-10-CM

## 2023-03-28 PROCEDURE — 25010000002 DIPHENHYDRAMINE PER 50 MG: Performed by: PSYCHIATRY & NEUROLOGY

## 2023-03-28 PROCEDURE — 96375 TX/PRO/DX INJ NEW DRUG ADDON: CPT

## 2023-03-28 PROCEDURE — 96365 THER/PROPH/DIAG IV INF INIT: CPT

## 2023-03-28 PROCEDURE — 25010000002 METHYLPREDNISOLONE PER 125 MG: Performed by: PSYCHIATRY & NEUROLOGY

## 2023-03-28 PROCEDURE — 25010000002 OCRELIZUMAB 300 MG/10ML SOLUTION 300 MG VIAL: Performed by: PSYCHIATRY & NEUROLOGY

## 2023-03-28 PROCEDURE — 96413 CHEMO IV INFUSION 1 HR: CPT

## 2023-03-28 PROCEDURE — 96415 CHEMO IV INFUSION ADDL HR: CPT

## 2023-03-28 PROCEDURE — 96366 THER/PROPH/DIAG IV INF ADDON: CPT

## 2023-03-28 RX ORDER — ACETAMINOPHEN 325 MG/1
650 TABLET ORAL EVERY 6 HOURS PRN
OUTPATIENT
Start: 2023-09-26

## 2023-03-28 RX ORDER — DIPHENHYDRAMINE HYDROCHLORIDE 50 MG/ML
50 INJECTION INTRAMUSCULAR; INTRAVENOUS AS NEEDED
OUTPATIENT
Start: 2023-09-26

## 2023-03-28 RX ORDER — DIPHENHYDRAMINE HYDROCHLORIDE 50 MG/ML
25 INJECTION INTRAMUSCULAR; INTRAVENOUS ONCE
Status: COMPLETED | OUTPATIENT
Start: 2023-03-28 | End: 2023-03-28

## 2023-03-28 RX ORDER — SODIUM CHLORIDE 9 MG/ML
250 INJECTION, SOLUTION INTRAVENOUS ONCE
Status: COMPLETED | OUTPATIENT
Start: 2023-03-28 | End: 2023-03-28

## 2023-03-28 RX ORDER — METHYLPREDNISOLONE SODIUM SUCCINATE 125 MG/2ML
100 INJECTION, POWDER, LYOPHILIZED, FOR SOLUTION INTRAMUSCULAR; INTRAVENOUS ONCE
Status: COMPLETED | OUTPATIENT
Start: 2023-03-28 | End: 2023-03-28

## 2023-03-28 RX ORDER — SODIUM CHLORIDE 9 MG/ML
250 INJECTION, SOLUTION INTRAVENOUS ONCE
OUTPATIENT
Start: 2023-09-26

## 2023-03-28 RX ORDER — ACETAMINOPHEN 325 MG/1
650 TABLET ORAL ONCE
OUTPATIENT
Start: 2023-09-26

## 2023-03-28 RX ORDER — MEPERIDINE HYDROCHLORIDE 50 MG/ML
25 INJECTION INTRAMUSCULAR; INTRAVENOUS; SUBCUTANEOUS
OUTPATIENT
Start: 2023-09-26

## 2023-03-28 RX ORDER — METHYLPREDNISOLONE SODIUM SUCCINATE 125 MG/2ML
100 INJECTION, POWDER, LYOPHILIZED, FOR SOLUTION INTRAMUSCULAR; INTRAVENOUS ONCE
OUTPATIENT
Start: 2023-09-26

## 2023-03-28 RX ORDER — FAMOTIDINE 10 MG/ML
20 INJECTION, SOLUTION INTRAVENOUS AS NEEDED
OUTPATIENT
Start: 2023-09-26

## 2023-03-28 RX ORDER — IBUPROFEN 400 MG/1
400 TABLET ORAL EVERY 6 HOURS PRN
OUTPATIENT
Start: 2023-09-26

## 2023-03-28 RX ORDER — DIPHENHYDRAMINE HYDROCHLORIDE 50 MG/ML
25 INJECTION INTRAMUSCULAR; INTRAVENOUS ONCE
OUTPATIENT
Start: 2023-09-26

## 2023-03-28 RX ORDER — ACETAMINOPHEN 325 MG/1
650 TABLET ORAL ONCE
Status: COMPLETED | OUTPATIENT
Start: 2023-03-28 | End: 2023-03-28

## 2023-03-28 RX ADMIN — OCRELIZUMAB 600 MG: 300 INJECTION INTRAVENOUS at 09:35

## 2023-03-28 RX ADMIN — DIPHENHYDRAMINE HYDROCHLORIDE 25 MG: 50 INJECTION INTRAMUSCULAR; INTRAVENOUS at 08:56

## 2023-03-28 RX ADMIN — ACETAMINOPHEN 650 MG: 325 TABLET ORAL at 08:56

## 2023-03-28 RX ADMIN — METHYLPREDNISOLONE SODIUM SUCCINATE 100 MG: 125 INJECTION, POWDER, FOR SOLUTION INTRAMUSCULAR; INTRAVENOUS at 08:56

## 2023-03-28 RX ADMIN — SODIUM CHLORIDE 250 ML: 9 INJECTION, SOLUTION INTRAVENOUS at 08:56

## 2023-03-28 NOTE — TELEPHONE ENCOUNTER
Caller: Avani Rm    Relationship: Self    Best call back number: [phone]    What medication are you requesting: WEGOVY    What are your current symptoms: WEIGHT LOSS    H If a prescription is needed, what is your preferred pharmacy and phone number: [id number] - [facility] - [address] AT [address] [facility] [phone] [facility] [phone] FX     Additional notes:  THIS IS A NEW PHARMACY AND NEEDS A NEW PRIOR AUTH

## 2023-03-28 NOTE — TELEPHONE ENCOUNTER
Pt called stating she tried phentermine mid 2020, 11/2020 Qsymia, the Contrave and xenyical are not recommended because of interactions with her MS medications.  If you want to addend you office note I can try again on the PA for the Wegovy.

## 2023-03-29 NOTE — TELEPHONE ENCOUNTER
I am not aware of any contraindications to Xenical or Contrave with patient's current MS regimen.  Did she want to try 1 of those?

## 2023-03-30 NOTE — TELEPHONE ENCOUNTER
Patient remembers taking orlistat and that caused severe diarrhea so she will try the contrave.  [facility]

## 2023-04-04 ENCOUNTER — TELEPHONE (OUTPATIENT)
Dept: FAMILY MEDICINE CLINIC | Facility: CLINIC | Age: 33
End: 2023-04-04
Payer: OTHER GOVERNMENT

## 2023-04-04 NOTE — TELEPHONE ENCOUNTER
RECEIVED FAX FROM [facility] THAT TRULICITY WAS PRESCRIBED BY YOU BUT BUT WASN'T COVERED UNDER THE PLAN, IT REQUIRES STEP THERAPY WHICH INCLUDES JANUVIA, GLYBURIDE, GLIPIZIDE, METFORMIN AND JARDIANCE.  TRULICITY IS NOT ON HER MEDICATION LIST.  PLEASE ADVISE.

## 2023-04-05 NOTE — TELEPHONE ENCOUNTER
I have not sent in Trulicity.  I sent in Mounjaro then Wegovy.  Patient is not a diabetic and does not need the listed medications.  She decided to try Contrave for weight loss and I sent that in last week.

## 2023-08-17 NOTE — PROGRESS NOTES
Avani is a 31-year-old female who presents status post cholecystectomy.  She reports that she is doing well after surgery.  She states that she is having bowel movements regularly.  She does complain of pain to her left abdomen.  Denies any other needs or concerns.    Abdomen appears to be healing well.  All incision sites are clean and dry.  There is no signs of infection or inflammation.  No signs of any hernias.    Avani is a 31-year-old female who presents status post cholecystectomy.  We discussed pathology in office today.  I reminded patient of lift restriction.  She is clear to go back to work.  She will return on an as-needed basis.  
fall/safety awareness

## 2023-09-06 ENCOUNTER — TELEPHONE (OUTPATIENT)
Dept: NEUROLOGY | Facility: CLINIC | Age: 33
End: 2023-09-06
Payer: OTHER GOVERNMENT

## 2023-09-06 NOTE — TELEPHONE ENCOUNTER
----- Message from Kiesha Mike sent at 9/2/2023 11:17 AM EDT -----  Regarding: Chitra Galan,  This pt will need an updated ofc visit before her insurance will update her Ocrevus auth.  It's been over a yr since her last visit.  She's scheduled for next infusion on 9/26, can you get her in before then so we don't have to push her out.    Thanks  Kiesha

## 2023-09-06 NOTE — TELEPHONE ENCOUNTER
Called and spoke with patient to get scheduled for infusion. Patient verbalized understanding and is scheduled for 9/8/2023 @ 9:45am.

## 2023-09-08 ENCOUNTER — OFFICE VISIT (OUTPATIENT)
Dept: NEUROLOGY | Facility: CLINIC | Age: 33
End: 2023-09-08
Payer: OTHER GOVERNMENT

## 2023-09-08 VITALS
WEIGHT: 157 LBS | BODY MASS INDEX: 30.82 KG/M2 | HEART RATE: 85 BPM | OXYGEN SATURATION: 98 % | DIASTOLIC BLOOD PRESSURE: 72 MMHG | SYSTOLIC BLOOD PRESSURE: 116 MMHG | HEIGHT: 60 IN

## 2023-09-08 DIAGNOSIS — F48.2 PBA (PSEUDOBULBAR AFFECT): ICD-10-CM

## 2023-09-08 DIAGNOSIS — G35 MULTIPLE SCLEROSIS: Primary | ICD-10-CM

## 2023-09-08 PROCEDURE — 99214 OFFICE O/P EST MOD 30 MIN: CPT | Performed by: PSYCHIATRY & NEUROLOGY

## 2023-09-08 RX ORDER — SEMAGLUTIDE 1.7 MG/.75ML
INJECTION, SOLUTION SUBCUTANEOUS
COMMUNITY
Start: 2023-08-30 | End: 2023-09-08 | Stop reason: SINTOL

## 2023-09-08 NOTE — PROGRESS NOTES
"Chief Complaint    Multiple Sclerosis    Subjective        Avani Rm presents to [facility] NEUROLOGY  History of Present Illness    32 y.o. female returns in follow up.  Last visit on 7/5/22 stopped Nuedexta, switched to Ocrevus.       Started on Ocrevus    Last infusion 9/28/22 10/12/22 3/28/23     MRI B/C, my review of films, 4/15/22 as compared to 9/15/2020 moderate T2 lesion load, normal cord, no new/enlarging/enhancing lesions.     Problem history:    N/T left side of body. MRI brain 9/15/20 w mod T2 load with enhancing lesion right parietal centrum semiovale.  MRI c-spine min bulging disc C5-6, C6-7-no lesions  LP w 3 OCB, IgG 10.1, IgG synth 37.4, prot 37.7  NMO/MOG-neg  BASSAM+  DMT: Tecfidera.    Objective   Vital Signs:  /72   Pulse 85   Ht 152.4 cm (60\")   Wt 71.2 kg (157 lb)   SpO2 98%   BMI 30.66 kg/m²   Estimated body mass index is 30.66 kg/m² as calculated from the following:    Height as of this encounter: 152.4 cm (60\").    Weight as of this encounter: 71.2 kg (157 lb).       Neurologic Exam     Mental Status   Oriented to person, place, and time.   Speech: speech is normal   Level of consciousness: alert  Knowledge: good and consistent with education.   Normal comprehension.     Cranial Nerves   Cranial nerves II through XII intact.     CN II   Visual fields full to confrontation.   Visual acuity: normal  Right visual field deficit: none  Left visual field deficit: none     CN III, IV, VI   Pupils are equal, round, and reactive to light.  Extraocular motions are normal.   Nystagmus: none   Diplopia: none  Ophthalmoparesis: none  Upgaze: normal  Downgaze: normal  Conjugate gaze: present    CN V   Facial sensation intact.   Right corneal reflex: normal  Left corneal reflex: normal    CN VII   Right facial weakness: none  Left facial weakness: none    CN VIII   Hearing: intact    CN IX, X   Palate: symmetric  Right gag reflex: normal  Left gag reflex: normal    CN XI "   Right sternocleidomastoid strength: normal  Left sternocleidomastoid strength: normal    CN XII   Tongue: not atrophic  Fasciculations: absent  Tongue deviation: none    Motor Exam   Muscle bulk: normal  Overall muscle tone: normal    Strength   Strength 5/5 throughout.     Sensory Exam   Light touch normal.     Gait, Coordination, and Reflexes     Gait  Gait: normal    Tremor   Resting tremor: absent  Intention tremor: absent  Action tremor: absent    Reflexes   Reflexes 2+ except as noted.          Physical Exam  Eyes:      Extraocular Movements: EOM normal.      Pupils: Pupils are equal, round, and reactive to light.   Neurological:      Mental Status: She is oriented to person, place, and time.      Cranial Nerves: Cranial nerves 2-12 are intact.      Motor: Motor strength is normal.      Gait: Gait is intact.   Psychiatric:         Speech: Speech normal.      Result Review :  The following data was reviewed by: Shaheen Reyes MD on 09/08/2023:  Common labs          11/18/2022    08:01   Common Labs   Glucose 106    BUN 8    Creatinine 0.62    Sodium 137    Potassium 3.9    Chloride 106    Calcium 8.6    Albumin 3.95    Total Bilirubin 0.3    Alkaline Phosphatase 61    AST (SGOT) 14    ALT (SGPT) 8    WBC 4.82    Hemoglobin 11.7    Hematocrit 36.0    Platelets 215      Data reviewed : Radiologic studies MRI B/C             Assessment and Plan   Diagnoses and all orders for this visit:    1. Multiple sclerosis (Primary)  Assessment & Plan:  Continue Ocrevus    MRI B/C      Orders:  -     MRI Brain With & Without Contrast; Future  -     MRI Cervical Spine With & Without Contrast; Future    2. PBA (pseudobulbar affect)             Follow Up   No follow-ups on file.  Patient was given instructions and counseling regarding her condition or for health maintenance advice. Please see specific information pulled into the AVS if appropriate.

## 2023-09-18 ENCOUNTER — TELEPHONE (OUTPATIENT)
Dept: NEUROLOGY | Facility: CLINIC | Age: 33
End: 2023-09-18
Payer: OTHER GOVERNMENT

## 2023-09-18 RX ORDER — METHYLPREDNISOLONE 4 MG/1
TABLET ORAL
Qty: 1 EACH | Refills: 0 | Status: SHIPPED | OUTPATIENT
Start: 2023-09-18

## 2023-09-18 NOTE — TELEPHONE ENCOUNTER
----- Message from Angelia Gross RN sent at 9/18/2023 12:19 PM EDT -----  Regarding: RE: Need to schedule appointment  Contact: [phone]  Medrol dose pack?    ----- Message -----  From: Odilonleighton Avani SEAN  Sent: 9/18/2023  12:06 PM EDT  To: Angelia Gross RN  Subject: Need to schedule appointment                     I checked with my insurance and they said I could get my infusion early but neither of the infusion clinics have any openings. Can you ask Dr. Reyes if there’s any medication I can take in the meantime? I’m sorry to aggravate, but I’ve never experienced this much trouble with my wrists and ankles it’s freaking me out a little. I have 3 kids I have to take care of and I can hardly get out of the bed.    Patient brought in from [facility] for a unwitnessed fall. Per EMS patient was laying in bed, when she rolled out of the bed, hitting her head small laceration noted on upper left lip. Unknown LOC, patient is denying LOC, EMS placed c-collar. Patient states she woke up at 3 am feeling dizzy and nauseous, so she rolled to the side to throw up and fell out of bed. She currently feels nauseous and dizzy. Patient is on [facility] level 2 trauma called. Upon arrival patient is noted to be on a continuous dobutamine drip.

## 2023-09-23 ENCOUNTER — HOSPITAL ENCOUNTER (OUTPATIENT)
Dept: MRI IMAGING | Facility: HOSPITAL | Age: 33
Discharge: HOME | End: 2023-09-23
Payer: OTHER GOVERNMENT

## 2023-09-23 DIAGNOSIS — G35 MULTIPLE SCLEROSIS: ICD-10-CM

## 2023-09-23 PROCEDURE — 72156 MRI NECK SPINE W/O & W/DYE: CPT

## 2023-09-23 PROCEDURE — 70553 MRI BRAIN STEM W/O & W/DYE: CPT

## 2023-09-23 PROCEDURE — A9577 INJ MULTIHANCE: HCPCS | Performed by: PSYCHIATRY & NEUROLOGY

## 2023-09-23 PROCEDURE — 0 GADOBENATE DIMEGLUMINE 529 MG/ML SOLUTION: Performed by: PSYCHIATRY & NEUROLOGY

## 2023-09-23 RX ADMIN — GADOBENATE DIMEGLUMINE 14 ML: 529 INJECTION, SOLUTION INTRAVENOUS at 17:34

## 2023-09-25 ENCOUNTER — INFUSION (OUTPATIENT)
Dept: ONCOLOGY | Facility: HOSPITAL | Age: 33
End: 2023-09-25

## 2023-09-25 VITALS
TEMPERATURE: 98.9 F | WEIGHT: 161.2 LBS | RESPIRATION RATE: 18 BRPM | SYSTOLIC BLOOD PRESSURE: 101 MMHG | OXYGEN SATURATION: 100 % | HEART RATE: 69 BPM | DIASTOLIC BLOOD PRESSURE: 60 MMHG | BODY MASS INDEX: 31.48 KG/M2

## 2023-09-25 DIAGNOSIS — G35 MULTIPLE SCLEROSIS: Primary | ICD-10-CM

## 2023-09-25 PROCEDURE — 96365 THER/PROPH/DIAG IV INF INIT: CPT

## 2023-09-25 PROCEDURE — 25010000002 METHYLPREDNISOLONE PER 125 MG: Performed by: PSYCHIATRY & NEUROLOGY

## 2023-09-25 PROCEDURE — 25010000002 DIPHENHYDRAMINE PER 50 MG: Performed by: PSYCHIATRY & NEUROLOGY

## 2023-09-25 PROCEDURE — 25010000002 OCRELIZUMAB 300 MG/10ML SOLUTION 300 MG VIAL: Performed by: PSYCHIATRY & NEUROLOGY

## 2023-09-25 PROCEDURE — 96375 TX/PRO/DX INJ NEW DRUG ADDON: CPT

## 2023-09-25 PROCEDURE — 96366 THER/PROPH/DIAG IV INF ADDON: CPT

## 2023-09-25 RX ORDER — FAMOTIDINE 10 MG/ML
20 INJECTION, SOLUTION INTRAVENOUS AS NEEDED
OUTPATIENT
Start: 2024-03-25

## 2023-09-25 RX ORDER — ACETAMINOPHEN 325 MG/1
650 TABLET ORAL EVERY 6 HOURS PRN
OUTPATIENT
Start: 2024-03-25

## 2023-09-25 RX ORDER — DIPHENHYDRAMINE HYDROCHLORIDE 50 MG/ML
25 INJECTION INTRAMUSCULAR; INTRAVENOUS ONCE
Status: COMPLETED | OUTPATIENT
Start: 2023-09-25 | End: 2023-09-25

## 2023-09-25 RX ORDER — ACETAMINOPHEN 325 MG/1
650 TABLET ORAL ONCE
OUTPATIENT
Start: 2024-03-25

## 2023-09-25 RX ORDER — ACETAMINOPHEN 325 MG/1
650 TABLET ORAL ONCE
Status: COMPLETED | OUTPATIENT
Start: 2023-09-25 | End: 2023-09-25

## 2023-09-25 RX ORDER — IBUPROFEN 400 MG/1
400 TABLET ORAL EVERY 6 HOURS PRN
OUTPATIENT
Start: 2024-03-25

## 2023-09-25 RX ORDER — DIPHENHYDRAMINE HYDROCHLORIDE 50 MG/ML
25 INJECTION INTRAMUSCULAR; INTRAVENOUS ONCE
OUTPATIENT
Start: 2024-03-25

## 2023-09-25 RX ORDER — METHYLPREDNISOLONE SODIUM SUCCINATE 125 MG/2ML
100 INJECTION, POWDER, LYOPHILIZED, FOR SOLUTION INTRAMUSCULAR; INTRAVENOUS ONCE
Status: COMPLETED | OUTPATIENT
Start: 2023-09-25 | End: 2023-09-25

## 2023-09-25 RX ORDER — MEPERIDINE HYDROCHLORIDE 50 MG/ML
25 INJECTION INTRAMUSCULAR; INTRAVENOUS; SUBCUTANEOUS
OUTPATIENT
Start: 2024-03-25

## 2023-09-25 RX ORDER — SODIUM CHLORIDE 9 MG/ML
250 INJECTION, SOLUTION INTRAVENOUS ONCE
Status: COMPLETED | OUTPATIENT
Start: 2023-09-25 | End: 2023-09-25

## 2023-09-25 RX ORDER — SODIUM CHLORIDE 9 MG/ML
250 INJECTION, SOLUTION INTRAVENOUS ONCE
OUTPATIENT
Start: 2024-03-25

## 2023-09-25 RX ORDER — DIPHENHYDRAMINE HYDROCHLORIDE 50 MG/ML
50 INJECTION INTRAMUSCULAR; INTRAVENOUS AS NEEDED
OUTPATIENT
Start: 2024-03-25

## 2023-09-25 RX ORDER — METHYLPREDNISOLONE SODIUM SUCCINATE 125 MG/2ML
100 INJECTION, POWDER, LYOPHILIZED, FOR SOLUTION INTRAMUSCULAR; INTRAVENOUS ONCE
OUTPATIENT
Start: 2024-03-25

## 2023-09-25 RX ADMIN — OCRELIZUMAB 600 MG: 300 INJECTION INTRAVENOUS at 09:25

## 2023-09-25 RX ADMIN — METHYLPREDNISOLONE SODIUM SUCCINATE 100 MG: 125 INJECTION, POWDER, FOR SOLUTION INTRAMUSCULAR; INTRAVENOUS at 08:56

## 2023-09-25 RX ADMIN — SODIUM CHLORIDE 250 ML: 9 INJECTION, SOLUTION INTRAVENOUS at 09:25

## 2023-09-25 RX ADMIN — DIPHENHYDRAMINE HYDROCHLORIDE 25 MG: 50 INJECTION, SOLUTION INTRAMUSCULAR; INTRAVENOUS at 08:55

## 2023-09-25 RX ADMIN — ACETAMINOPHEN 650 MG: 325 TABLET ORAL at 08:55

## 2023-09-26 ENCOUNTER — TELEPHONE (OUTPATIENT)
Dept: NEUROLOGY | Facility: CLINIC | Age: 33
End: 2023-09-26
Payer: OTHER GOVERNMENT

## 2023-09-26 NOTE — TELEPHONE ENCOUNTER
----- Message from Shaheen Reyes MD sent at 9/25/2023  7:51 AM EDT -----  Notify pt MRI is stable

## 2024-03-05 ENCOUNTER — HOSPITAL ENCOUNTER (OUTPATIENT)
Dept: RESPIRATORY THERAPY | Facility: HOSPITAL | Age: 34
Discharge: HOME | End: 2024-03-05
Payer: OTHER GOVERNMENT

## 2024-03-05 ENCOUNTER — LAB (OUTPATIENT)
Dept: LAB | Facility: HOSPITAL | Age: 34
End: 2024-03-05
Payer: OTHER GOVERNMENT

## 2024-03-05 ENCOUNTER — TRANSCRIBE ORDERS (OUTPATIENT)
Dept: ADMINISTRATIVE | Facility: HOSPITAL | Age: 34
End: 2024-03-05
Payer: OTHER GOVERNMENT

## 2024-03-05 ENCOUNTER — HOSPITAL ENCOUNTER (OUTPATIENT)
Dept: GENERAL RADIOLOGY | Facility: HOSPITAL | Age: 34
Discharge: HOME | End: 2024-03-05
Payer: OTHER GOVERNMENT

## 2024-03-05 DIAGNOSIS — R06.02 SHORTNESS OF BREATH: Primary | ICD-10-CM

## 2024-03-05 DIAGNOSIS — R06.02 SHORTNESS OF BREATH: ICD-10-CM

## 2024-03-05 DIAGNOSIS — R53.83 TIREDNESS: ICD-10-CM

## 2024-03-05 LAB
QT INTERVAL: 414 MS
QTC INTERVAL: 453 MS

## 2024-03-05 PROCEDURE — 71046 X-RAY EXAM CHEST 2 VIEWS: CPT

## 2024-03-05 PROCEDURE — 80053 COMPREHEN METABOLIC PANEL: CPT

## 2024-03-05 PROCEDURE — 36415 COLL VENOUS BLD VENIPUNCTURE: CPT

## 2024-03-05 PROCEDURE — 84443 ASSAY THYROID STIM HORMONE: CPT

## 2024-03-05 PROCEDURE — 85025 COMPLETE CBC W/AUTO DIFF WBC: CPT

## 2024-03-05 PROCEDURE — 80061 LIPID PANEL: CPT

## 2024-03-05 PROCEDURE — 93005 ELECTROCARDIOGRAM TRACING: CPT | Performed by: NURSE PRACTITIONER

## 2024-03-06 LAB
ALBUMIN SERPL-MCNC: 4 G/DL (ref 3.5–5.2)
ALBUMIN/GLOB SERPL: 1.5 G/DL
ALP SERPL-CCNC: 66 U/L (ref 39–117)
ALT SERPL W P-5'-P-CCNC: 9 U/L (ref 1–33)
ANION GAP SERPL CALCULATED.3IONS-SCNC: 13.4 MMOL/L (ref 5–15)
AST SERPL-CCNC: 18 U/L (ref 1–32)
BASOPHILS # BLD AUTO: 0.04 10*3/MM3 (ref 0–0.2)
BASOPHILS NFR BLD AUTO: 0.4 % (ref 0–1.5)
BILIRUB SERPL-MCNC: 0.4 MG/DL (ref 0–1.2)
BUN SERPL-MCNC: 6 MG/DL (ref 6–20)
BUN/CREAT SERPL: 8.6 (ref 7–25)
CALCIUM SPEC-SCNC: 8.6 MG/DL (ref 8.6–10.5)
CHLORIDE SERPL-SCNC: 102 MMOL/L (ref 98–107)
CHOLEST SERPL-MCNC: 153 MG/DL (ref 0–200)
CO2 SERPL-SCNC: 22.6 MMOL/L (ref 22–29)
CREAT SERPL-MCNC: 0.7 MG/DL (ref 0.57–1)
DEPRECATED RDW RBC AUTO: 36.3 FL (ref 37–54)
EGFRCR SERPLBLD CKD-EPI 2021: 115.8 ML/MIN/1.73
EOSINOPHIL # BLD AUTO: 0.11 10*3/MM3 (ref 0–0.4)
EOSINOPHIL NFR BLD AUTO: 1.2 % (ref 0.3–6.2)
ERYTHROCYTE [DISTWIDTH] IN BLOOD BY AUTOMATED COUNT: 12.3 % (ref 12.3–15.4)
GLOBULIN UR ELPH-MCNC: 2.7 GM/DL
GLUCOSE SERPL-MCNC: 88 MG/DL (ref 65–99)
HCT VFR BLD AUTO: 34.9 % (ref 34–46.6)
HDLC SERPL-MCNC: 38 MG/DL (ref 40–60)
HGB BLD-MCNC: 11.8 G/DL (ref 12–15.9)
IMM GRANULOCYTES # BLD AUTO: 0.03 10*3/MM3 (ref 0–0.05)
IMM GRANULOCYTES NFR BLD AUTO: 0.3 % (ref 0–0.5)
LDLC SERPL CALC-MCNC: 94 MG/DL (ref 0–100)
LDLC/HDLC SERPL: 2.41 {RATIO}
LYMPHOCYTES # BLD AUTO: 2.34 10*3/MM3 (ref 0.7–3.1)
LYMPHOCYTES NFR BLD AUTO: 25.4 % (ref 19.6–45.3)
MCH RBC QN AUTO: 27.6 PG (ref 26.6–33)
MCHC RBC AUTO-ENTMCNC: 33.8 G/DL (ref 31.5–35.7)
MCV RBC AUTO: 81.7 FL (ref 79–97)
MONOCYTES # BLD AUTO: 0.59 10*3/MM3 (ref 0.1–0.9)
MONOCYTES NFR BLD AUTO: 6.4 % (ref 5–12)
NEUTROPHILS NFR BLD AUTO: 6.12 10*3/MM3 (ref 1.7–7)
NEUTROPHILS NFR BLD AUTO: 66.3 % (ref 42.7–76)
NRBC BLD AUTO-RTO: 0 /100 WBC (ref 0–0.2)
PLATELET # BLD AUTO: 244 10*3/MM3 (ref 140–450)
PMV BLD AUTO: 12.1 FL (ref 6–12)
POTASSIUM SERPL-SCNC: 4 MMOL/L (ref 3.5–5.2)
PROT SERPL-MCNC: 6.7 G/DL (ref 6–8.5)
RBC # BLD AUTO: 4.27 10*6/MM3 (ref 3.77–5.28)
SODIUM SERPL-SCNC: 138 MMOL/L (ref 136–145)
TRIGL SERPL-MCNC: 117 MG/DL (ref 0–150)
TSH SERPL DL<=0.05 MIU/L-ACNC: 1.3 UIU/ML (ref 0.27–4.2)
VLDLC SERPL-MCNC: 21 MG/DL (ref 5–40)
WBC NRBC COR # BLD AUTO: 9.23 10*3/MM3 (ref 3.4–10.8)

## 2024-03-08 ENCOUNTER — TELEPHONE (OUTPATIENT)
Dept: NEUROLOGY | Facility: CLINIC | Age: 34
End: 2024-03-08
Payer: OTHER GOVERNMENT

## 2024-03-08 RX ORDER — SODIUM CHLORIDE 9 MG/ML
250 INJECTION, SOLUTION INTRAVENOUS ONCE
Status: CANCELLED | OUTPATIENT
Start: 2024-03-10

## 2024-03-08 RX ORDER — ACETAMINOPHEN 325 MG/1
650 TABLET ORAL EVERY 6 HOURS PRN
Status: CANCELLED | OUTPATIENT
Start: 2024-03-11

## 2024-03-08 RX ORDER — IBUPROFEN 400 MG/1
400 TABLET ORAL EVERY 6 HOURS PRN
Status: CANCELLED | OUTPATIENT
Start: 2024-03-10

## 2024-03-08 RX ORDER — DIPHENHYDRAMINE HYDROCHLORIDE 50 MG/ML
25 INJECTION INTRAMUSCULAR; INTRAVENOUS ONCE
Status: CANCELLED | OUTPATIENT
Start: 2024-03-10

## 2024-03-08 RX ORDER — DIPHENHYDRAMINE HYDROCHLORIDE 50 MG/ML
50 INJECTION INTRAMUSCULAR; INTRAVENOUS AS NEEDED
Status: CANCELLED | OUTPATIENT
Start: 2024-03-11

## 2024-03-08 RX ORDER — FAMOTIDINE 10 MG/ML
20 INJECTION, SOLUTION INTRAVENOUS AS NEEDED
Status: CANCELLED | OUTPATIENT
Start: 2024-03-11

## 2024-03-08 RX ORDER — MEPERIDINE HYDROCHLORIDE 50 MG/ML
25 INJECTION INTRAMUSCULAR; INTRAVENOUS; SUBCUTANEOUS
Status: CANCELLED | OUTPATIENT
Start: 2024-03-10

## 2024-03-08 RX ORDER — ACETAMINOPHEN 325 MG/1
650 TABLET ORAL ONCE
Status: CANCELLED | OUTPATIENT
Start: 2024-03-10

## 2024-03-08 NOTE — TELEPHONE ENCOUNTER
Natasha from [facility] called to clarify about Ocrevus infusion. Per patient she said she is supposed to be getting her infusion 2 weeks earlier due to symptoms coming on earlier. Looks like we discussed with her before her September infusion she could get it early. Patient is scheduled in [place] 3/11/2024 but it would be 2 weeks earlier than normal. And if you do agree we need to change the therapy plan to be every 24 weeks instead of every 26 weeks. Please advise.

## 2024-03-11 ENCOUNTER — INFUSION (OUTPATIENT)
Dept: ONCOLOGY | Facility: HOSPITAL | Age: 34
End: 2024-03-11
Payer: OTHER GOVERNMENT

## 2024-03-11 VITALS
TEMPERATURE: 97.7 F | OXYGEN SATURATION: 98 % | SYSTOLIC BLOOD PRESSURE: 103 MMHG | DIASTOLIC BLOOD PRESSURE: 61 MMHG | WEIGHT: 167 LBS | RESPIRATION RATE: 18 BRPM | HEART RATE: 87 BPM | BODY MASS INDEX: 32.61 KG/M2

## 2024-03-11 DIAGNOSIS — G35 MULTIPLE SCLEROSIS: Primary | ICD-10-CM

## 2024-03-11 PROCEDURE — 25010000002 METHYLPREDNISOLONE PER 125 MG: Performed by: PSYCHIATRY & NEUROLOGY

## 2024-03-11 PROCEDURE — 25010000002 DIPHENHYDRAMINE PER 50 MG: Performed by: PSYCHIATRY & NEUROLOGY

## 2024-03-11 PROCEDURE — 96415 CHEMO IV INFUSION ADDL HR: CPT

## 2024-03-11 PROCEDURE — 25810000003 SODIUM CHLORIDE 0.9 % SOLUTION: Performed by: PSYCHIATRY & NEUROLOGY

## 2024-03-11 PROCEDURE — 96375 TX/PRO/DX INJ NEW DRUG ADDON: CPT

## 2024-03-11 PROCEDURE — 25810000003 SODIUM CHLORIDE 0.9 % SOLUTION 500 ML FLEX CONT: Performed by: PSYCHIATRY & NEUROLOGY

## 2024-03-11 PROCEDURE — 96366 THER/PROPH/DIAG IV INF ADDON: CPT

## 2024-03-11 PROCEDURE — 96365 THER/PROPH/DIAG IV INF INIT: CPT

## 2024-03-11 PROCEDURE — 96413 CHEMO IV INFUSION 1 HR: CPT

## 2024-03-11 PROCEDURE — 25010000002 OCRELIZUMAB 300 MG/10ML SOLUTION 300 MG VIAL: Performed by: PSYCHIATRY & NEUROLOGY

## 2024-03-11 RX ORDER — FAMOTIDINE 10 MG/ML
20 INJECTION, SOLUTION INTRAVENOUS AS NEEDED
OUTPATIENT
Start: 2024-03-25

## 2024-03-11 RX ORDER — DIPHENHYDRAMINE HYDROCHLORIDE 50 MG/ML
25 INJECTION INTRAMUSCULAR; INTRAVENOUS ONCE
Status: COMPLETED | OUTPATIENT
Start: 2024-03-11 | End: 2024-03-11

## 2024-03-11 RX ORDER — ACETAMINOPHEN 325 MG/1
650 TABLET ORAL ONCE
OUTPATIENT
Start: 2024-03-25

## 2024-03-11 RX ORDER — DIPHENHYDRAMINE HYDROCHLORIDE 50 MG/ML
50 INJECTION INTRAMUSCULAR; INTRAVENOUS AS NEEDED
OUTPATIENT
Start: 2024-03-25

## 2024-03-11 RX ORDER — SODIUM CHLORIDE 9 MG/ML
250 INJECTION, SOLUTION INTRAVENOUS ONCE
Status: COMPLETED | OUTPATIENT
Start: 2024-03-11 | End: 2024-03-11

## 2024-03-11 RX ORDER — SODIUM CHLORIDE 9 MG/ML
250 INJECTION, SOLUTION INTRAVENOUS ONCE
OUTPATIENT
Start: 2024-03-25

## 2024-03-11 RX ORDER — METHYLPREDNISOLONE SODIUM SUCCINATE 125 MG/2ML
100 INJECTION, POWDER, LYOPHILIZED, FOR SOLUTION INTRAMUSCULAR; INTRAVENOUS ONCE
Status: COMPLETED | OUTPATIENT
Start: 2024-03-11 | End: 2024-03-11

## 2024-03-11 RX ORDER — ACETAMINOPHEN 325 MG/1
650 TABLET ORAL ONCE
Status: COMPLETED | OUTPATIENT
Start: 2024-03-11 | End: 2024-03-11

## 2024-03-11 RX ORDER — DIPHENHYDRAMINE HYDROCHLORIDE 50 MG/ML
25 INJECTION INTRAMUSCULAR; INTRAVENOUS ONCE
OUTPATIENT
Start: 2024-03-25

## 2024-03-11 RX ORDER — ACETAMINOPHEN 325 MG/1
650 TABLET ORAL EVERY 6 HOURS PRN
OUTPATIENT
Start: 2024-03-25

## 2024-03-11 RX ORDER — MEPERIDINE HYDROCHLORIDE 50 MG/ML
25 INJECTION INTRAMUSCULAR; INTRAVENOUS; SUBCUTANEOUS
OUTPATIENT
Start: 2024-03-25

## 2024-03-11 RX ORDER — IBUPROFEN 400 MG/1
400 TABLET ORAL EVERY 6 HOURS PRN
OUTPATIENT
Start: 2024-03-25

## 2024-03-11 RX ORDER — METHYLPREDNISOLONE SODIUM SUCCINATE 125 MG/2ML
100 INJECTION, POWDER, LYOPHILIZED, FOR SOLUTION INTRAMUSCULAR; INTRAVENOUS ONCE
OUTPATIENT
Start: 2024-03-25

## 2024-03-11 RX ADMIN — METHYLPREDNISOLONE SODIUM SUCCINATE 100 MG: 125 INJECTION, POWDER, FOR SOLUTION INTRAMUSCULAR; INTRAVENOUS at 08:58

## 2024-03-11 RX ADMIN — DIPHENHYDRAMINE HYDROCHLORIDE 25 MG: 50 INJECTION, SOLUTION INTRAMUSCULAR; INTRAVENOUS at 09:00

## 2024-03-11 RX ADMIN — SODIUM CHLORIDE 250 ML: 9 INJECTION, SOLUTION INTRAVENOUS at 08:56

## 2024-03-11 RX ADMIN — OCRELIZUMAB 600 MG: 300 INJECTION INTRAVENOUS at 09:27

## 2024-03-11 RX ADMIN — ACETAMINOPHEN 650 MG: 325 TABLET ORAL at 08:58

## 2024-03-12 ENCOUNTER — OFFICE VISIT (OUTPATIENT)
Dept: NEUROLOGY | Facility: CLINIC | Age: 34
End: 2024-03-12
Payer: OTHER GOVERNMENT

## 2024-03-12 VITALS
HEART RATE: 80 BPM | HEIGHT: 60 IN | WEIGHT: 167.11 LBS | SYSTOLIC BLOOD PRESSURE: 108 MMHG | DIASTOLIC BLOOD PRESSURE: 66 MMHG | OXYGEN SATURATION: 99 % | BODY MASS INDEX: 32.81 KG/M2

## 2024-03-12 DIAGNOSIS — F48.2 PBA (PSEUDOBULBAR AFFECT): Chronic | ICD-10-CM

## 2024-03-12 DIAGNOSIS — G35 MULTIPLE SCLEROSIS: Primary | Chronic | ICD-10-CM

## 2024-03-12 DIAGNOSIS — R53.83 OTHER FATIGUE: ICD-10-CM

## 2024-03-12 PROCEDURE — 99214 OFFICE O/P EST MOD 30 MIN: CPT | Performed by: PSYCHIATRY & NEUROLOGY

## 2024-03-12 RX ORDER — TIRZEPATIDE 2.5 MG/.5ML
INJECTION, SOLUTION SUBCUTANEOUS
COMMUNITY
Start: 2024-02-22

## 2024-03-12 RX ORDER — PHENAZOPYRIDINE HYDROCHLORIDE 100 MG/1
TABLET, FILM COATED ORAL
COMMUNITY

## 2024-03-12 RX ORDER — MODAFINIL 200 MG/1
200 TABLET ORAL DAILY
Qty: 30 TABLET | Refills: 2 | Status: SHIPPED | OUTPATIENT
Start: 2024-03-12 | End: 2025-03-12

## 2024-03-12 RX ORDER — FLUTICASONE PROPIONATE 50 MCG
SPRAY, SUSPENSION (ML) NASAL
COMMUNITY
Start: 2024-03-08 | End: 2024-03-12

## 2024-03-12 RX ORDER — IBUPROFEN 600 MG/1
TABLET ORAL
COMMUNITY

## 2024-03-12 NOTE — PROGRESS NOTES
"Chief Complaint  Multiple Sclerosis    Subjective        Avani Rm presents to [facility] NEUROLOGY  History of Present Illness    33 y.o. female returns in follow up.  Last visit on 9/8/23 continued Ocrevus, ordered MRI B/C.      Tolerating Ocrevus.     Mild intermittent N/T on left side.     Trouble remembering words.      Fatigue is bothersome.   MRI B/C, my review of films, 9/23/23 as compared to 4/15/22 moderate T2 lesion load, normal cord, no new/enlarging/enhancing lesions.      Problem history:     N/T left side of body. MRI brain 9/15/20 w mod T2 load with enhancing lesion right parietal centrum semiovale.  MRI c-spine min bulging disc C5-6, C6-7-no lesions  LP w 3 OCB, IgG 10.1, IgG synth 37.4, prot 37.7  NMO/MOG-neg  BASSAM+  DMT: Tecfidera.    Objective   Vital Signs:  /66   Pulse 80   Ht 152.4 cm (60\")   Wt 75.8 kg (167 lb 1.7 oz)   SpO2 99%   BMI 32.64 kg/m²   Estimated body mass index is 32.64 kg/m² as calculated from the following:    Height as of this encounter: 152.4 cm (60\").    Weight as of this encounter: 75.8 kg (167 lb 1.7 oz).       Neurologic Exam     Mental Status   Oriented to person, place, and time.   Speech: speech is normal   Level of consciousness: alert  Knowledge: good and consistent with education.   Normal comprehension.     Cranial Nerves   Cranial nerves II through XII intact.     CN II   Visual fields full to confrontation.   Visual acuity: normal  Right visual field deficit: none  Left visual field deficit: none     CN III, IV, VI   Pupils are equal, round, and reactive to light.  Extraocular motions are normal.   Nystagmus: none   Diplopia: none  Ophthalmoparesis: none  Upgaze: normal  Downgaze: normal  Conjugate gaze: present    CN V   Facial sensation intact.   Right corneal reflex: normal  Left corneal reflex: normal    CN VII   Right facial weakness: none  Left facial weakness: none    CN VIII   Hearing: intact    CN IX, X   Palate: " symmetric  Right gag reflex: normal  Left gag reflex: normal    CN XI   Right sternocleidomastoid strength: normal  Left sternocleidomastoid strength: normal    CN XII   Tongue: not atrophic  Fasciculations: absent  Tongue deviation: none    Motor Exam   Muscle bulk: normal  Overall muscle tone: normal    Strength   Strength 5/5 throughout.     Sensory Exam   Light touch normal.     Gait, Coordination, and Reflexes     Gait  Gait: normal    Tremor   Resting tremor: absent  Intention tremor: absent  Action tremor: absent    Reflexes   Reflexes 2+ except as noted.           Physical Exam  Eyes:      Extraocular Movements: EOM normal.      Pupils: Pupils are equal, round, and reactive to light.   Neurological:      Mental Status: She is oriented to person, place, and time.      Cranial Nerves: Cranial nerves 2-12 are intact.      Motor: Motor strength is normal.     Gait: Gait is intact.   Psychiatric:         Speech: Speech normal.        Result Review :    The following data was reviewed by: Shaheen Reyes MD on 03/12/2024:  Common labs          3/5/2024    14:19   Common Labs   Glucose 88    BUN 6    Creatinine 0.70    Sodium 138    Potassium 4.0    Chloride 102    Calcium 8.6    Albumin 4.0    Total Bilirubin 0.4    Alkaline Phosphatase 66    AST (SGOT) 18    ALT (SGPT) 9    WBC 9.23    Hemoglobin 11.8    Hematocrit 34.9    Platelets 244    Total Cholesterol 153    Triglycerides 117    HDL Cholesterol 38    LDL Cholesterol  94      Data reviewed : Radiologic studies MRI B/C             Assessment and Plan     Diagnoses and all orders for this visit:    1. Multiple sclerosis (Primary)  Assessment & Plan:  Tolerating Ocrevus       2. PBA (pseudobulbar affect)    3. Other fatigue  Assessment & Plan:  Start Provigil     Orders:  -     modafinil (PROVIGIL) 200 MG tablet; Take 1 tablet by mouth Daily.  Dispense: 30 tablet; Refill: 2             Follow Up     No follow-ups on file.  Patient was given instructions and  counseling regarding her condition or for health maintenance advice. Please see specific information pulled into the AVS if appropriate.

## 2024-06-28 ENCOUNTER — TELEPHONE (OUTPATIENT)
Dept: NEUROLOGY | Facility: CLINIC | Age: 34
End: 2024-06-28

## 2024-06-28 DIAGNOSIS — R53.83 OTHER FATIGUE: ICD-10-CM

## 2024-06-28 RX ORDER — METHYLPREDNISOLONE 4 MG/1
TABLET ORAL
Qty: 1 EACH | Refills: 0 | Status: SHIPPED | OUTPATIENT
Start: 2024-06-28

## 2024-06-28 RX ORDER — MODAFINIL 200 MG/1
200 TABLET ORAL DAILY
Qty: 30 TABLET | Refills: 0 | Status: SHIPPED | OUTPATIENT
Start: 2024-06-28 | End: 2025-06-28

## 2024-06-28 NOTE — TELEPHONE ENCOUNTER
Caller: Avani Rm     Best call back number:   Telephone Information:   Mobile [phone]     What is the best time to reach you: ANYTIME    Who are you requesting to speak with (clinical staff, provider,  specific staff member): CLINICAL      What was the call regarding: CALLING TO CHECK ON STATUS OF MYCHART MESSAGE SENT. PLEASE REVIEW AND ADVISE.     Patient Comment: I am on vacation and I’m having some issues, I want to know if you can call in a steroid pack and the modafinil to [facility] pharmacy in [facility].     PT DID REQUEST I SEND AS URGENT

## 2024-06-28 NOTE — TELEPHONE ENCOUNTER
Also requesting a steroid pack be sent to [facility] in [place], [place]    Rx Refill Note  Requested Prescriptions     Pending Prescriptions Disp Refills    modafinil (PROVIGIL) 200 MG tablet 30 tablet 2     Sig: Take 1 tablet by mouth Daily.      Last filled:  03/12/24 w/2  Last office visit with prescribing clinician: 3/12/2024      Next office visit with prescribing clinician: 6/28/2024     Rachael Nair MA  06/28/24, 13:48 EDT

## 2024-07-15 DIAGNOSIS — R53.83 OTHER FATIGUE: ICD-10-CM

## 2024-07-15 RX ORDER — MODAFINIL 200 MG/1
200 TABLET ORAL DAILY
Qty: 90 TABLET | Refills: 1 | Status: SHIPPED | OUTPATIENT
Start: 2024-07-15 | End: 2025-07-15

## 2024-07-15 NOTE — TELEPHONE ENCOUNTER
----- Message from [facility] revoPT sent at 7/15/2024  9:14 AM EDT -----  Regarding: Pharmacy change  Contact: [phone]  I had my medication called in while I was on vacation and I was unable to pick it up at the local pharmacy there. Can you transfer my medication to [facility] [place]? The medication is modafinil. Please let me know and thank you!

## 2024-08-26 ENCOUNTER — INFUSION (OUTPATIENT)
Dept: ONCOLOGY | Facility: HOSPITAL | Age: 34
End: 2024-08-26
Payer: OTHER GOVERNMENT

## 2024-08-26 VITALS
BODY MASS INDEX: 30.92 KG/M2 | OXYGEN SATURATION: 96 % | SYSTOLIC BLOOD PRESSURE: 112 MMHG | DIASTOLIC BLOOD PRESSURE: 64 MMHG | RESPIRATION RATE: 18 BRPM | WEIGHT: 158.3 LBS | HEART RATE: 79 BPM | TEMPERATURE: 97.5 F

## 2024-08-26 DIAGNOSIS — G35 MULTIPLE SCLEROSIS: Primary | ICD-10-CM

## 2024-08-26 PROCEDURE — 25010000002 METHYLPREDNISOLONE PER 125 MG: Performed by: PSYCHIATRY & NEUROLOGY

## 2024-08-26 PROCEDURE — 96413 CHEMO IV INFUSION 1 HR: CPT

## 2024-08-26 PROCEDURE — 96365 THER/PROPH/DIAG IV INF INIT: CPT

## 2024-08-26 PROCEDURE — 96415 CHEMO IV INFUSION ADDL HR: CPT

## 2024-08-26 PROCEDURE — 96375 TX/PRO/DX INJ NEW DRUG ADDON: CPT

## 2024-08-26 PROCEDURE — 96366 THER/PROPH/DIAG IV INF ADDON: CPT

## 2024-08-26 PROCEDURE — 25010000002 OCRELIZUMAB 300 MG/10ML SOLUTION 300 MG VIAL: Performed by: PSYCHIATRY & NEUROLOGY

## 2024-08-26 PROCEDURE — 25810000003 SODIUM CHLORIDE 0.9 % SOLUTION 500 ML FLEX CONT: Performed by: PSYCHIATRY & NEUROLOGY

## 2024-08-26 PROCEDURE — 25810000003 SODIUM CHLORIDE 0.9 % SOLUTION: Performed by: PSYCHIATRY & NEUROLOGY

## 2024-08-26 PROCEDURE — 25010000002 DIPHENHYDRAMINE PER 50 MG: Performed by: PSYCHIATRY & NEUROLOGY

## 2024-08-26 RX ORDER — MEPERIDINE HYDROCHLORIDE 50 MG/ML
25 INJECTION INTRAMUSCULAR; INTRAVENOUS; SUBCUTANEOUS
OUTPATIENT
Start: 2024-09-23

## 2024-08-26 RX ORDER — ACETAMINOPHEN 325 MG/1
650 TABLET ORAL ONCE
Status: COMPLETED | OUTPATIENT
Start: 2024-08-26 | End: 2024-08-26

## 2024-08-26 RX ORDER — SODIUM CHLORIDE 9 MG/ML
250 INJECTION, SOLUTION INTRAVENOUS ONCE
Status: COMPLETED | OUTPATIENT
Start: 2024-08-26 | End: 2024-08-26

## 2024-08-26 RX ORDER — ACETAMINOPHEN 325 MG/1
650 TABLET ORAL ONCE
Status: CANCELLED | OUTPATIENT
Start: 2024-09-23

## 2024-08-26 RX ORDER — DIPHENHYDRAMINE HYDROCHLORIDE 50 MG/ML
25 INJECTION INTRAMUSCULAR; INTRAVENOUS ONCE
Status: CANCELLED | OUTPATIENT
Start: 2024-09-23

## 2024-08-26 RX ORDER — SODIUM CHLORIDE 9 MG/ML
250 INJECTION, SOLUTION INTRAVENOUS ONCE
Status: CANCELLED | OUTPATIENT
Start: 2024-09-23

## 2024-08-26 RX ORDER — IBUPROFEN 400 MG/1
400 TABLET, FILM COATED ORAL EVERY 6 HOURS PRN
OUTPATIENT
Start: 2024-09-23

## 2024-08-26 RX ORDER — FAMOTIDINE 10 MG/ML
20 INJECTION, SOLUTION INTRAVENOUS AS NEEDED
OUTPATIENT
Start: 2024-09-23

## 2024-08-26 RX ORDER — DIPHENHYDRAMINE HYDROCHLORIDE 50 MG/ML
50 INJECTION INTRAMUSCULAR; INTRAVENOUS AS NEEDED
OUTPATIENT
Start: 2024-09-23

## 2024-08-26 RX ORDER — METHYLPREDNISOLONE SODIUM SUCCINATE 125 MG/2ML
100 INJECTION, POWDER, LYOPHILIZED, FOR SOLUTION INTRAMUSCULAR; INTRAVENOUS ONCE
Status: CANCELLED | OUTPATIENT
Start: 2024-09-23

## 2024-08-26 RX ORDER — METHYLPREDNISOLONE SODIUM SUCCINATE 125 MG/2ML
100 INJECTION, POWDER, LYOPHILIZED, FOR SOLUTION INTRAMUSCULAR; INTRAVENOUS ONCE
Status: COMPLETED | OUTPATIENT
Start: 2024-08-26 | End: 2024-08-26

## 2024-08-26 RX ORDER — DIPHENHYDRAMINE HYDROCHLORIDE 50 MG/ML
25 INJECTION INTRAMUSCULAR; INTRAVENOUS ONCE
Status: COMPLETED | OUTPATIENT
Start: 2024-08-26 | End: 2024-08-26

## 2024-08-26 RX ORDER — ACETAMINOPHEN 325 MG/1
650 TABLET ORAL EVERY 6 HOURS PRN
OUTPATIENT
Start: 2024-09-23

## 2024-08-26 RX ADMIN — DIPHENHYDRAMINE HYDROCHLORIDE 25 MG: 50 INJECTION, SOLUTION INTRAMUSCULAR; INTRAVENOUS at 09:00

## 2024-08-26 RX ADMIN — METHYLPREDNISOLONE SODIUM SUCCINATE 100 MG: 125 INJECTION, POWDER, FOR SOLUTION INTRAMUSCULAR; INTRAVENOUS at 09:00

## 2024-08-26 RX ADMIN — ACETAMINOPHEN 650 MG: 325 TABLET ORAL at 09:00

## 2024-08-26 RX ADMIN — SODIUM CHLORIDE 250 ML: 9 INJECTION, SOLUTION INTRAVENOUS at 09:42

## 2024-08-26 RX ADMIN — OCRELIZUMAB 600 MG: 300 INJECTION INTRAVENOUS at 09:42

## 2024-10-17 ENCOUNTER — TELEPHONE (OUTPATIENT)
Dept: NEUROLOGY | Facility: CLINIC | Age: 34
End: 2024-10-17
Payer: OTHER GOVERNMENT

## 2024-10-17 DIAGNOSIS — G35 MULTIPLE SCLEROSIS: ICD-10-CM

## 2024-10-17 DIAGNOSIS — R53.83 OTHER FATIGUE: Primary | ICD-10-CM

## 2024-10-17 NOTE — TELEPHONE ENCOUNTER
PATIENT WOULD LIKE A REFERRAL FOR MRI    SHE SAYS SHE HAD TO CANCEL LAST OV WHERE A REFERRAL FOR MRI WOULD HAVE BEEN MADE.  SHE WANTS TO GET THIS BACK ON THE SCHEDULE.

## 2024-11-18 ENCOUNTER — HOSPITAL ENCOUNTER (OUTPATIENT)
Dept: MRI IMAGING | Facility: HOSPITAL | Age: 34
Discharge: HOME | End: 2024-11-18
Payer: OTHER GOVERNMENT

## 2024-11-18 DIAGNOSIS — G35 MULTIPLE SCLEROSIS: ICD-10-CM

## 2024-11-18 PROCEDURE — A9577 INJ MULTIHANCE: HCPCS | Performed by: PSYCHIATRY & NEUROLOGY

## 2024-11-18 PROCEDURE — 72156 MRI NECK SPINE W/O & W/DYE: CPT | Performed by: RADIOLOGY

## 2024-11-18 PROCEDURE — 70553 MRI BRAIN STEM W/O & W/DYE: CPT

## 2024-11-18 PROCEDURE — 25510000002 GADOBENATE DIMEGLUMINE 529 MG/ML SOLUTION: Performed by: PSYCHIATRY & NEUROLOGY

## 2024-11-18 PROCEDURE — 70553 MRI BRAIN STEM W/O & W/DYE: CPT | Performed by: RADIOLOGY

## 2024-11-18 PROCEDURE — 72156 MRI NECK SPINE W/O & W/DYE: CPT

## 2024-11-18 RX ADMIN — GADOBENATE DIMEGLUMINE 12 ML: 529 INJECTION, SOLUTION INTRAVENOUS at 10:02

## 2025-02-04 ENCOUNTER — OFFICE VISIT (OUTPATIENT)
Dept: NEUROLOGY | Facility: CLINIC | Age: 35
End: 2025-02-04
Payer: OTHER GOVERNMENT

## 2025-02-04 VITALS
HEIGHT: 60 IN | OXYGEN SATURATION: 99 % | HEART RATE: 71 BPM | WEIGHT: 142 LBS | BODY MASS INDEX: 27.88 KG/M2 | SYSTOLIC BLOOD PRESSURE: 118 MMHG | DIASTOLIC BLOOD PRESSURE: 78 MMHG

## 2025-02-04 DIAGNOSIS — G25.81 RESTLESS LEGS SYNDROME (RLS): ICD-10-CM

## 2025-02-04 DIAGNOSIS — G35 MULTIPLE SCLEROSIS: Primary | Chronic | ICD-10-CM

## 2025-02-04 DIAGNOSIS — G35 MULTIPLE SCLEROSIS: Primary | ICD-10-CM

## 2025-02-04 DIAGNOSIS — R53.83 OTHER FATIGUE: ICD-10-CM

## 2025-02-04 PROCEDURE — 99214 OFFICE O/P EST MOD 30 MIN: CPT | Performed by: PSYCHIATRY & NEUROLOGY

## 2025-02-04 RX ORDER — FAMOTIDINE 10 MG/ML
20 INJECTION, SOLUTION INTRAVENOUS AS NEEDED
Status: CANCELLED | OUTPATIENT
Start: 2025-02-10

## 2025-02-04 RX ORDER — TIRZEPATIDE 7.5 MG/.5ML
INJECTION, SOLUTION SUBCUTANEOUS
COMMUNITY
Start: 2024-10-27 | End: 2025-02-04

## 2025-02-04 RX ORDER — DEXAMETHASONE 4 MG/1
20 TABLET ORAL ONCE
Status: CANCELLED | OUTPATIENT
Start: 2025-02-10

## 2025-02-04 RX ORDER — HYDROCORTISONE SODIUM SUCCINATE 100 MG/2ML
100 INJECTION INTRAMUSCULAR; INTRAVENOUS AS NEEDED
Status: CANCELLED | OUTPATIENT
Start: 2025-02-10

## 2025-02-04 RX ORDER — DIPHENHYDRAMINE HCL 25 MG
25 CAPSULE ORAL ONCE
Status: CANCELLED | OUTPATIENT
Start: 2025-02-10

## 2025-02-04 RX ORDER — SODIUM CHLORIDE 9 MG/ML
20 INJECTION, SOLUTION INTRAVENOUS ONCE
Status: CANCELLED | OUTPATIENT
Start: 2025-02-04

## 2025-02-04 RX ORDER — DIPHENHYDRAMINE HYDROCHLORIDE 50 MG/ML
50 INJECTION INTRAMUSCULAR; INTRAVENOUS AS NEEDED
Status: CANCELLED | OUTPATIENT
Start: 2025-02-10

## 2025-02-04 RX ORDER — OXCARBAZEPINE 150 MG/1
150-300 TABLET, FILM COATED ORAL NIGHTLY
Qty: 60 TABLET | Refills: 5 | Status: SHIPPED | OUTPATIENT
Start: 2025-02-04

## 2025-02-04 RX ORDER — MODAFINIL 200 MG/1
200 TABLET ORAL DAILY
Qty: 90 TABLET | Refills: 1 | Status: SHIPPED | OUTPATIENT
Start: 2025-02-04 | End: 2026-02-04

## 2025-02-04 RX ORDER — ACETAMINOPHEN 325 MG/1
650 TABLET ORAL ONCE
Status: CANCELLED | OUTPATIENT
Start: 2025-02-10

## 2025-02-04 RX ORDER — MEPERIDINE HYDROCHLORIDE 25 MG/ML
25 INJECTION INTRAMUSCULAR; INTRAVENOUS; SUBCUTANEOUS
Status: CANCELLED | OUTPATIENT
Start: 2025-02-10

## 2025-02-04 NOTE — PROGRESS NOTES
"Chief Complaint  Multiple Sclerosis    Subjective        Avani Rm presents to [facility] NEUROLOGY  History of Present Illness    34 y.o. female returns in follow up.  Last visit on 9/8/23 continued Ocrevus, ordered MRI B/C.       MRI B/C, my review of films, 11/18/24 as compared to  9/23/23 moderate T2 lesion load, normal cord, no new/enlarging/enhancing lesions.     Episodes of spasms in left hand and leg.  Intensity 5/10.  Lasts for 1 - 2 hours.  Occurring once a week.      Tolerating Ocrevus.      Mild intermittent N/T on left side.      Trouble remembering words.       Fatigue is bothersome.      Problem history:     N/T left side of body. MRI brain 9/15/20 w mod T2 load with enhancing lesion right parietal centrum semiovale.  MRI c-spine min bulging disc C5-6, C6-7-no lesions  LP w 3 OCB, IgG 10.1, IgG synth 37.4, prot 37.7  NMO/MOG-neg  BASSAM+  DMT: Tecfidera.  Objective   Vital Signs:  /78   Pulse 71   Ht 152.4 cm (60\")   Wt 64.4 kg (142 lb)   SpO2 99%   BMI 27.73 kg/m²   Estimated body mass index is 27.73 kg/m² as calculated from the following:    Height as of this encounter: 152.4 cm (60\").    Weight as of this encounter: 64.4 kg (142 lb).        Neurological Exam  Mental Status  Awake, alert and oriented to person, place and time. Oriented to person, place and time. Speech is normal. Language is fluent with no aphasia. Attention and concentration are normal. Fund of knowledge is appropriate for level of education.    Cranial Nerves  CN III, IV, VI: Extraocular movements intact bilaterally. Pupils equal round and reactive to light bilaterally.  CN V: Facial sensation is normal.  CN VII: Full and symmetric facial movement.  CN IX, X: Palate elevates symmetrically  CN XI: Shoulder shrug strength is normal.  CN XII: Tongue midline without atrophy or fasciculations.    Motor   Strength is 5/5 throughout all four extremities.    Sensory  Sensation is intact to light touch, " pinprick, vibration and proprioception in all four extremities.    Reflexes  Deep tendon reflexes are 2+ and symmetric in all four extremities.    Coordination    Finger-to-nose, rapid alternating movements and heel-to-shin normal bilaterally without dysmetria.    Gait  Normal casual, toe, heel and tandem gait.       Physical Exam  Eyes:      Extraocular Movements: Extraocular movements intact.      Pupils: Pupils are equal, round, and reactive to light.   Neurological:      Motor: Motor strength is normal.     Coordination: Coordination is intact.      Deep Tendon Reflexes: Reflexes are normal and symmetric.   Psychiatric:         Speech: Speech normal.        Result Review :                Assessment and Plan   Diagnoses and all orders for this visit:    1. Multiple sclerosis (Primary)  Assessment & Plan:  Switch Ocrevus Zenovo 920 mg sq 6 months.    Provigil 200 mg qam           2. Restless legs syndrome (RLS)  Assessment & Plan:  Oxcarbazepine 150 - 300 mg qhs       3. Other fatigue  -     modafinil (PROVIGIL) 200 MG tablet; Take 1 tablet by mouth Daily.  Dispense: 90 tablet; Refill: 1    Other orders  -     OXcarbazepine (TRILEPTAL) 150 MG tablet; Take 1-2 tablets by mouth Every Night.  Dispense: 60 tablet; Refill: 5             Follow Up   No follow-ups on file.  Patient was given instructions and counseling regarding her condition or for health maintenance advice. Please see specific information pulled into the AVS if appropriate.

## 2025-02-10 ENCOUNTER — INFUSION (OUTPATIENT)
Dept: ONCOLOGY | Facility: HOSPITAL | Age: 35
End: 2025-02-10
Payer: OTHER GOVERNMENT

## 2025-02-10 VITALS
HEART RATE: 71 BPM | DIASTOLIC BLOOD PRESSURE: 60 MMHG | BODY MASS INDEX: 27.97 KG/M2 | OXYGEN SATURATION: 99 % | TEMPERATURE: 97.7 F | SYSTOLIC BLOOD PRESSURE: 99 MMHG | WEIGHT: 143.2 LBS | RESPIRATION RATE: 16 BRPM

## 2025-02-10 DIAGNOSIS — G35 MULTIPLE SCLEROSIS: Primary | ICD-10-CM

## 2025-02-10 LAB
HBV SURFACE AB SER RIA-ACNC: REACTIVE
HBV SURFACE AG SERPL QL IA: NORMAL
IGA1 MFR SER: 104 MG/DL (ref 70–400)
IGG1 SER-MCNC: 927 MG/DL (ref 700–1600)
IGM SERPL-MCNC: 80 MG/DL (ref 40–230)

## 2025-02-10 PROCEDURE — 25010000002: Performed by: PSYCHIATRY & NEUROLOGY

## 2025-02-10 PROCEDURE — 63710000001 DIPHENHYDRAMINE PER 50 MG: Performed by: PSYCHIATRY & NEUROLOGY

## 2025-02-10 PROCEDURE — 96372 THER/PROPH/DIAG INJ SC/IM: CPT

## 2025-02-10 PROCEDURE — 87340 HEPATITIS B SURFACE AG IA: CPT

## 2025-02-10 PROCEDURE — 86706 HEP B SURFACE ANTIBODY: CPT

## 2025-02-10 PROCEDURE — 96401 CHEMO ANTI-NEOPL SQ/IM: CPT

## 2025-02-10 PROCEDURE — 86704 HEP B CORE ANTIBODY TOTAL: CPT

## 2025-02-10 PROCEDURE — 63710000001 DEXAMETHASONE PER 0.25 MG: Performed by: PSYCHIATRY & NEUROLOGY

## 2025-02-10 PROCEDURE — C9399 UNCLASSIFIED DRUGS OR BIOLOG: HCPCS | Performed by: PSYCHIATRY & NEUROLOGY

## 2025-02-10 PROCEDURE — 82784 ASSAY IGA/IGD/IGG/IGM EACH: CPT

## 2025-02-10 RX ORDER — HYDROCORTISONE SODIUM SUCCINATE 100 MG/2ML
100 INJECTION INTRAMUSCULAR; INTRAVENOUS AS NEEDED
OUTPATIENT
Start: 2025-08-09

## 2025-02-10 RX ORDER — MEPERIDINE HYDROCHLORIDE 25 MG/ML
25 INJECTION INTRAMUSCULAR; INTRAVENOUS; SUBCUTANEOUS
OUTPATIENT
Start: 2025-08-09

## 2025-02-10 RX ORDER — DEXAMETHASONE 4 MG/1
20 TABLET ORAL ONCE
Status: COMPLETED | OUTPATIENT
Start: 2025-02-10 | End: 2025-02-10

## 2025-02-10 RX ORDER — ACETAMINOPHEN 325 MG/1
650 TABLET ORAL ONCE
OUTPATIENT
Start: 2025-08-09

## 2025-02-10 RX ORDER — ACETAMINOPHEN 325 MG/1
650 TABLET ORAL ONCE
Status: COMPLETED | OUTPATIENT
Start: 2025-02-10 | End: 2025-02-10

## 2025-02-10 RX ORDER — DIPHENHYDRAMINE HYDROCHLORIDE 50 MG/ML
50 INJECTION INTRAMUSCULAR; INTRAVENOUS AS NEEDED
OUTPATIENT
Start: 2025-08-09

## 2025-02-10 RX ORDER — DIPHENHYDRAMINE HCL 25 MG
25 CAPSULE ORAL ONCE
OUTPATIENT
Start: 2025-08-09

## 2025-02-10 RX ORDER — FAMOTIDINE 10 MG/ML
20 INJECTION, SOLUTION INTRAVENOUS AS NEEDED
OUTPATIENT
Start: 2025-08-09

## 2025-02-10 RX ORDER — SODIUM CHLORIDE 9 MG/ML
20 INJECTION, SOLUTION INTRAVENOUS ONCE
OUTPATIENT
Start: 2025-08-09

## 2025-02-10 RX ORDER — DEXAMETHASONE 4 MG/1
20 TABLET ORAL ONCE
OUTPATIENT
Start: 2025-08-09

## 2025-02-10 RX ORDER — DIPHENHYDRAMINE HCL 25 MG
25 CAPSULE ORAL ONCE
Status: COMPLETED | OUTPATIENT
Start: 2025-02-10 | End: 2025-02-10

## 2025-02-10 RX ADMIN — OCRELIZUMAB AND HYALURONIDASE 920 MG: 23000; 920 INJECTION, SOLUTION SUBCUTANEOUS at 09:22

## 2025-02-10 RX ADMIN — DEXAMETHASONE 20 MG: 4 TABLET ORAL at 08:39

## 2025-02-10 RX ADMIN — ACETAMINOPHEN 650 MG: 325 TABLET ORAL at 08:40

## 2025-02-10 RX ADMIN — DIPHENHYDRAMINE HYDROCHLORIDE 25 MG: 25 CAPSULE ORAL at 08:40

## 2025-02-11 LAB — HBV CORE AB SERPL QL IA: NEGATIVE

## 2025-07-25 ENCOUNTER — TELEPHONE (OUTPATIENT)
Dept: NEUROLOGY | Facility: CLINIC | Age: 35
End: 2025-07-25
Payer: OTHER GOVERNMENT

## 2025-07-25 DIAGNOSIS — G35 MULTIPLE SCLEROSIS: Primary | ICD-10-CM

## 2025-07-25 RX ORDER — ACETAMINOPHEN 325 MG/1
650 TABLET ORAL EVERY 6 HOURS PRN
Status: CANCELLED | OUTPATIENT
Start: 2025-07-28

## 2025-07-25 RX ORDER — MEPERIDINE HYDROCHLORIDE 25 MG/ML
25 INJECTION INTRAMUSCULAR; INTRAVENOUS; SUBCUTANEOUS
Status: CANCELLED | OUTPATIENT
Start: 2025-07-28

## 2025-07-25 RX ORDER — SODIUM CHLORIDE 9 MG/ML
20 INJECTION, SOLUTION INTRAVENOUS ONCE
Status: CANCELLED | OUTPATIENT
Start: 2025-07-28

## 2025-07-25 RX ORDER — ACETAMINOPHEN 325 MG/1
650 TABLET ORAL ONCE
Status: CANCELLED | OUTPATIENT
Start: 2025-07-28

## 2025-07-25 RX ORDER — HYDROCORTISONE SODIUM SUCCINATE 100 MG/2ML
100 INJECTION INTRAMUSCULAR; INTRAVENOUS AS NEEDED
Status: CANCELLED | OUTPATIENT
Start: 2025-07-28

## 2025-07-25 RX ORDER — FAMOTIDINE 10 MG/ML
20 INJECTION, SOLUTION INTRAVENOUS AS NEEDED
Status: CANCELLED | OUTPATIENT
Start: 2025-07-28

## 2025-07-25 RX ORDER — DIPHENHYDRAMINE HYDROCHLORIDE 50 MG/ML
50 INJECTION, SOLUTION INTRAMUSCULAR; INTRAVENOUS AS NEEDED
Status: CANCELLED | OUTPATIENT
Start: 2025-07-28

## 2025-07-25 RX ORDER — DIPHENHYDRAMINE HYDROCHLORIDE 50 MG/ML
25 INJECTION, SOLUTION INTRAMUSCULAR; INTRAVENOUS ONCE
Status: CANCELLED | OUTPATIENT
Start: 2025-07-28

## 2025-07-25 NOTE — TELEPHONE ENCOUNTER
Caller: Avani Rm    Relationship: Self    Best call back number: [phone]    Who are you requesting to speak with (clinical staff, provider,  specific staff member): DR HENRY    What was the call regarding: PATIENT HAS APPOINTMENT TOMORROW 07- FOR A SUB-Q MEDICATION - PATIENT WOULD LIKE DR HENRY TO CHANGE THE ORDER BACK TO INFUSION OF MEDICATION - AS THE LAST TIME SHE HAD THE SUB-Q DOSE IT MADE HER STOMACH FEEL SICK AND THE INFUSION OF THE MEDICATION SEEMED TO WORK BETTER.

## 2025-07-25 NOTE — TELEPHONE ENCOUNTER
Spoke with patient to let her know that I will have to check with our authorization team to see if we will need a new authorization or not. If we do need a new one she will likely not be able to infuse on Monday but I will let her know.

## 2025-07-28 ENCOUNTER — INFUSION (OUTPATIENT)
Dept: ONCOLOGY | Facility: HOSPITAL | Age: 35
End: 2025-07-28
Payer: OTHER GOVERNMENT

## 2025-07-28 VITALS
DIASTOLIC BLOOD PRESSURE: 61 MMHG | BODY MASS INDEX: 27.69 KG/M2 | RESPIRATION RATE: 17 BRPM | HEART RATE: 80 BPM | WEIGHT: 141.8 LBS | TEMPERATURE: 98.3 F | OXYGEN SATURATION: 97 % | SYSTOLIC BLOOD PRESSURE: 103 MMHG

## 2025-07-28 DIAGNOSIS — G35 MULTIPLE SCLEROSIS: Primary | ICD-10-CM

## 2025-07-28 LAB
ALBUMIN SERPL-MCNC: 4.2 G/DL (ref 3.5–5.2)
ALBUMIN/GLOB SERPL: 1.8 G/DL
ALP SERPL-CCNC: 55 U/L (ref 39–117)
ALT SERPL W P-5'-P-CCNC: <5 U/L (ref 1–33)
ANION GAP SERPL CALCULATED.3IONS-SCNC: 10.4 MMOL/L (ref 5–15)
AST SERPL-CCNC: 16 U/L (ref 1–32)
BASOPHILS # BLD AUTO: 0.03 10*3/MM3 (ref 0–0.2)
BASOPHILS NFR BLD AUTO: 0.6 % (ref 0–1.5)
BILIRUB SERPL-MCNC: 0.7 MG/DL (ref 0–1.2)
BUN SERPL-MCNC: 5.8 MG/DL (ref 6–20)
BUN/CREAT SERPL: 9.5 (ref 7–25)
CALCIUM SPEC-SCNC: 8.5 MG/DL (ref 8.6–10.5)
CHLORIDE SERPL-SCNC: 103 MMOL/L (ref 98–107)
CO2 SERPL-SCNC: 20.6 MMOL/L (ref 22–29)
CREAT SERPL-MCNC: 0.61 MG/DL (ref 0.57–1)
DEPRECATED RDW RBC AUTO: 38 FL (ref 37–54)
EGFRCR SERPLBLD CKD-EPI 2021: 119 ML/MIN/1.73
EOSINOPHIL # BLD AUTO: 0.06 10*3/MM3 (ref 0–0.4)
EOSINOPHIL NFR BLD AUTO: 1.3 % (ref 0.3–6.2)
ERYTHROCYTE [DISTWIDTH] IN BLOOD BY AUTOMATED COUNT: 12.7 % (ref 12.3–15.4)
GLOBULIN UR ELPH-MCNC: 2.4 GM/DL
GLUCOSE SERPL-MCNC: 94 MG/DL (ref 65–99)
HCT VFR BLD AUTO: 37.4 % (ref 34–46.6)
HGB BLD-MCNC: 12.3 G/DL (ref 12–15.9)
IMM GRANULOCYTES # BLD AUTO: 0.01 10*3/MM3 (ref 0–0.05)
IMM GRANULOCYTES NFR BLD AUTO: 0.2 % (ref 0–0.5)
LYMPHOCYTES # BLD AUTO: 2.02 10*3/MM3 (ref 0.7–3.1)
LYMPHOCYTES NFR BLD AUTO: 43.2 % (ref 19.6–45.3)
MCH RBC QN AUTO: 27.1 PG (ref 26.6–33)
MCHC RBC AUTO-ENTMCNC: 32.9 G/DL (ref 31.5–35.7)
MCV RBC AUTO: 82.4 FL (ref 79–97)
MONOCYTES # BLD AUTO: 0.35 10*3/MM3 (ref 0.1–0.9)
MONOCYTES NFR BLD AUTO: 7.5 % (ref 5–12)
NEUTROPHILS NFR BLD AUTO: 2.21 10*3/MM3 (ref 1.7–7)
NEUTROPHILS NFR BLD AUTO: 47.2 % (ref 42.7–76)
NRBC BLD AUTO-RTO: 0 /100 WBC (ref 0–0.2)
PLATELET # BLD AUTO: 199 10*3/MM3 (ref 140–450)
PMV BLD AUTO: 10.6 FL (ref 6–12)
POTASSIUM SERPL-SCNC: 3.5 MMOL/L (ref 3.5–5.2)
PROT SERPL-MCNC: 6.6 G/DL (ref 6–8.5)
RBC # BLD AUTO: 4.54 10*6/MM3 (ref 3.77–5.28)
SODIUM SERPL-SCNC: 134 MMOL/L (ref 136–145)
WBC NRBC COR # BLD AUTO: 4.68 10*3/MM3 (ref 3.4–10.8)

## 2025-07-28 PROCEDURE — 25010000002 METHYLPREDNISOLONE PER 125 MG: Performed by: NURSE PRACTITIONER

## 2025-07-28 PROCEDURE — 25810000003 SODIUM CHLORIDE 0.9 % SOLUTION: Performed by: NURSE PRACTITIONER

## 2025-07-28 PROCEDURE — 96375 TX/PRO/DX INJ NEW DRUG ADDON: CPT

## 2025-07-28 PROCEDURE — 25010000002 DIPHENHYDRAMINE PER 50 MG: Performed by: NURSE PRACTITIONER

## 2025-07-28 PROCEDURE — 25810000003 SODIUM CHLORIDE 0.9 % SOLUTION 500 ML FLEX CONT: Performed by: NURSE PRACTITIONER

## 2025-07-28 PROCEDURE — 96413 CHEMO IV INFUSION 1 HR: CPT

## 2025-07-28 PROCEDURE — 96415 CHEMO IV INFUSION ADDL HR: CPT

## 2025-07-28 PROCEDURE — 85025 COMPLETE CBC W/AUTO DIFF WBC: CPT

## 2025-07-28 PROCEDURE — 25010000002 OCRELIZUMAB 300 MG/10ML SOLUTION 300 MG VIAL: Performed by: NURSE PRACTITIONER

## 2025-07-28 PROCEDURE — 80053 COMPREHEN METABOLIC PANEL: CPT

## 2025-07-28 RX ORDER — DIPHENHYDRAMINE HYDROCHLORIDE 50 MG/ML
25 INJECTION, SOLUTION INTRAMUSCULAR; INTRAVENOUS ONCE
Status: COMPLETED | OUTPATIENT
Start: 2025-07-28 | End: 2025-07-28

## 2025-07-28 RX ORDER — ACETAMINOPHEN 325 MG/1
650 TABLET ORAL ONCE
Status: COMPLETED | OUTPATIENT
Start: 2025-07-28 | End: 2025-07-28

## 2025-07-28 RX ORDER — DIPHENHYDRAMINE HYDROCHLORIDE 50 MG/ML
50 INJECTION, SOLUTION INTRAMUSCULAR; INTRAVENOUS AS NEEDED
OUTPATIENT
Start: 2026-01-25

## 2025-07-28 RX ORDER — SODIUM CHLORIDE 9 MG/ML
20 INJECTION, SOLUTION INTRAVENOUS ONCE
OUTPATIENT
Start: 2026-01-25

## 2025-07-28 RX ORDER — FAMOTIDINE 10 MG/ML
20 INJECTION, SOLUTION INTRAVENOUS AS NEEDED
OUTPATIENT
Start: 2026-01-25

## 2025-07-28 RX ORDER — MEPERIDINE HYDROCHLORIDE 50 MG/ML
25 INJECTION INTRAMUSCULAR; INTRAVENOUS; SUBCUTANEOUS
OUTPATIENT
Start: 2026-01-25

## 2025-07-28 RX ORDER — DIPHENHYDRAMINE HYDROCHLORIDE 50 MG/ML
25 INJECTION, SOLUTION INTRAMUSCULAR; INTRAVENOUS ONCE
OUTPATIENT
Start: 2026-01-25

## 2025-07-28 RX ORDER — HYDROCORTISONE SODIUM SUCCINATE 100 MG/2ML
100 INJECTION INTRAMUSCULAR; INTRAVENOUS AS NEEDED
OUTPATIENT
Start: 2026-01-25

## 2025-07-28 RX ORDER — ACETAMINOPHEN 325 MG/1
650 TABLET ORAL EVERY 6 HOURS PRN
OUTPATIENT
Start: 2026-01-25

## 2025-07-28 RX ORDER — SODIUM CHLORIDE 9 MG/ML
20 INJECTION, SOLUTION INTRAVENOUS ONCE
Status: COMPLETED | OUTPATIENT
Start: 2025-07-28 | End: 2025-07-28

## 2025-07-28 RX ORDER — ACETAMINOPHEN 325 MG/1
650 TABLET ORAL ONCE
OUTPATIENT
Start: 2026-01-25

## 2025-07-28 RX ADMIN — OCRELIZUMAB 600 MG: 300 INJECTION INTRAVENOUS at 09:23

## 2025-07-28 RX ADMIN — METHYLPREDNISOLONE SODIUM SUCCINATE 100 MG: 125 INJECTION INTRAMUSCULAR; INTRAVENOUS at 08:58

## 2025-07-28 RX ADMIN — ACETAMINOPHEN 650 MG: 325 TABLET ORAL at 08:56

## 2025-07-28 RX ADMIN — SODIUM CHLORIDE 20 ML/HR: 9 INJECTION, SOLUTION INTRAVENOUS at 09:23

## 2025-07-28 RX ADMIN — DIPHENHYDRAMINE HYDROCHLORIDE 25 MG: 50 INJECTION INTRAMUSCULAR; INTRAVENOUS at 08:58

## 2025-08-05 ENCOUNTER — OFFICE VISIT (OUTPATIENT)
Facility: HOSPITAL | Age: 35
End: 2025-08-05
Payer: OTHER GOVERNMENT

## 2025-08-05 VITALS
WEIGHT: 141.76 LBS | HEIGHT: 60 IN | BODY MASS INDEX: 27.83 KG/M2 | DIASTOLIC BLOOD PRESSURE: 66 MMHG | OXYGEN SATURATION: 100 % | HEART RATE: 69 BPM | SYSTOLIC BLOOD PRESSURE: 104 MMHG

## 2025-08-05 DIAGNOSIS — F48.2 PBA (PSEUDOBULBAR AFFECT): Chronic | ICD-10-CM

## 2025-08-05 DIAGNOSIS — G35 MULTIPLE SCLEROSIS: Primary | Chronic | ICD-10-CM

## 2025-08-05 DIAGNOSIS — R53.83 OTHER FATIGUE: ICD-10-CM

## 2025-08-05 DIAGNOSIS — G25.81 RESTLESS LEGS SYNDROME (RLS): ICD-10-CM

## 2025-08-05 PROCEDURE — 99214 OFFICE O/P EST MOD 30 MIN: CPT | Performed by: PSYCHIATRY & NEUROLOGY

## 2025-08-05 PROCEDURE — G0463 HOSPITAL OUTPT CLINIC VISIT: HCPCS | Performed by: PSYCHIATRY & NEUROLOGY

## 2025-08-05 RX ORDER — OXCARBAZEPINE 150 MG/1
150-300 TABLET, FILM COATED ORAL NIGHTLY
Qty: 60 TABLET | Refills: 5 | Status: SHIPPED | OUTPATIENT
Start: 2025-08-05

## 2025-08-05 RX ORDER — TIRZEPATIDE 2.5 MG/.5ML
INJECTION, SOLUTION SUBCUTANEOUS
COMMUNITY

## 2025-08-05 RX ORDER — MODAFINIL 200 MG/1
200 TABLET ORAL DAILY
Qty: 90 TABLET | Refills: 1 | Status: SHIPPED | OUTPATIENT
Start: 2025-08-05 | End: 2026-08-05

## 2025-08-16 DIAGNOSIS — R53.83 OTHER FATIGUE: ICD-10-CM

## 2025-08-16 DIAGNOSIS — K21.9 GASTROESOPHAGEAL REFLUX DISEASE WITHOUT ESOPHAGITIS: Chronic | ICD-10-CM

## 2025-08-18 RX ORDER — MODAFINIL 200 MG/1
200 TABLET ORAL DAILY
Qty: 90 TABLET | Refills: 1 | OUTPATIENT
Start: 2025-08-18 | End: 2026-08-18

## 2025-08-18 RX ORDER — ESOMEPRAZOLE MAGNESIUM 40 MG/1
40 CAPSULE, DELAYED RELEASE ORAL
Qty: 30 CAPSULE | Refills: 5 | OUTPATIENT
Start: 2025-08-18

## 2025-08-18 RX ORDER — OXCARBAZEPINE 150 MG/1
150-300 TABLET, FILM COATED ORAL NIGHTLY
Qty: 60 TABLET | Refills: 5 | OUTPATIENT
Start: 2025-08-18

## 2025-08-28 ENCOUNTER — HOSPITAL ENCOUNTER (OUTPATIENT)
Dept: ULTRASOUND IMAGING | Facility: HOSPITAL | Age: 35
Discharge: HOME | End: 2025-08-28
Payer: OTHER GOVERNMENT

## 2025-08-28 ENCOUNTER — HOSPITAL ENCOUNTER (OUTPATIENT)
Dept: MAMMOGRAPHY | Facility: HOSPITAL | Age: 35
Discharge: HOME | End: 2025-08-28
Payer: OTHER GOVERNMENT

## 2025-08-28 DIAGNOSIS — N63.42 UNSPECIFIED LUMP IN LEFT BREAST, SUBAREOLAR: ICD-10-CM

## 2025-08-28 PROCEDURE — 77066 DX MAMMO INCL CAD BI: CPT

## 2025-08-28 PROCEDURE — G0279 TOMOSYNTHESIS, MAMMO: HCPCS

## 2025-08-28 PROCEDURE — 76642 ULTRASOUND BREAST LIMITED: CPT

## (undated) DEVICE — SPNG GZ WOVN 4X4IN 12PLY 10/BX STRL

## (undated) DEVICE — ADHS SKIN PREMIERPRO EXOFIN TOPICAL HI/VISC .5ML

## (undated) DEVICE — Device

## (undated) DEVICE — GLV SURG PREMIERPRO MIC LTX PF SZ8 BRN

## (undated) DEVICE — 40595 XL TRENDELENBURG POSITIONING KIT: Brand: 40595 XL TRENDELENBURG POSITIONING KIT

## (undated) DEVICE — PK LAP GEN 70

## (undated) DEVICE — CVR DISP HUG U VAC STEEP TREND

## (undated) DEVICE — BIT DRL QC DIA 2.5X110MM

## (undated) DEVICE — SUT MNCRYL 4/0 PS2 18 IN

## (undated) DEVICE — APPL HEMO SURG ARISTA/AH/FLEXITIP XL 38CM

## (undated) DEVICE — PK EXTREM UPPR 70

## (undated) DEVICE — HOLDER: Brand: DEROYAL

## (undated) DEVICE — UNDERCAST PADDING: Brand: DEROYAL

## (undated) DEVICE — COVER,MAYO STAND,STERILE: Brand: MEDLINE

## (undated) DEVICE — ARM DRAPE

## (undated) DEVICE — PAD POSTN ARMBND 1P/U

## (undated) DEVICE — GLV SURG TRIUMPH ORTHO W/ALOE PF LTX 8.5 STRL

## (undated) DEVICE — CANNULA SEAL

## (undated) DEVICE — DRSNG WND GZ CURAD OIL EMULSION 3X8IN LF STRL 1PK

## (undated) DEVICE — PERMANENT CAUTERY HOOK: Brand: ENDOWRIST

## (undated) DEVICE — LARGE HEM-O-LOK CLIP APPLIER: Brand: ENDOWRIST

## (undated) DEVICE — BLADELESS OBTURATOR: Brand: WECK VISTA

## (undated) DEVICE — DRP C/ARM W/BAND W/CLIPS 41X74IN

## (undated) DEVICE — ST TBG PNEUMOCLEAR EVAC SMOKE HIFLO

## (undated) DEVICE — APPL CHLORAPREP HI/LITE 26ML ORNG

## (undated) DEVICE — UNDERGLV SURG BIOGEL INDICAT PF 8 GRN

## (undated) DEVICE — DRAPE,UTILTY,TAPE,15X26, 4EA/PK: Brand: MEDLINE

## (undated) DEVICE — PAD CAST SOF ROL NS 2IN

## (undated) DEVICE — SUT VIC 0/0 UR6 27IN DYED J603H

## (undated) DEVICE — UNDYED BRAIDED (POLYGLACTIN 910), SYNTHETIC ABSORBABLE SUTURE: Brand: COATED VICRYL

## (undated) DEVICE — GLV SURG PREMIERPRO MIC LTX PF SZ7.5 BRN

## (undated) DEVICE — PROXIMATE RH ROTATING HEAD SKIN STAPLERS (35 WIDE) CONTAINS 35 STAINLESS STEEL STAPLES: Brand: PROXIMATE

## (undated) DEVICE — CADIERE FORCEPS: Brand: ENDOWRIST